# Patient Record
Sex: FEMALE | Race: WHITE | Employment: STUDENT | URBAN - METROPOLITAN AREA
[De-identification: names, ages, dates, MRNs, and addresses within clinical notes are randomized per-mention and may not be internally consistent; named-entity substitution may affect disease eponyms.]

---
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
## 2024-01-24 ENCOUNTER — LAB ENCOUNTER (OUTPATIENT)
Dept: LAB | Facility: HOSPITAL | Age: 19
End: 2024-01-24
Attending: INTERNAL MEDICINE
Payer: COMMERCIAL

## 2024-01-24 DIAGNOSIS — R79.89 HYPOURICEMIA: ICD-10-CM

## 2024-01-24 DIAGNOSIS — E22.2 SYNDROME OF INAPPROPRIATE VASOPRESSIN SECRETION (HCC): Primary | ICD-10-CM

## 2024-01-24 LAB
CORTIS SERPL-MCNC: 30.1 UG/DL
SODIUM SERPL-SCNC: 139 MMOL/L (ref 136–145)

## 2024-01-24 PROCEDURE — 36415 COLL VENOUS BLD VENIPUNCTURE: CPT

## 2024-01-24 PROCEDURE — 82533 TOTAL CORTISOL: CPT

## 2024-01-24 PROCEDURE — 84295 ASSAY OF SERUM SODIUM: CPT

## 2024-01-25 ENCOUNTER — LAB ENCOUNTER (OUTPATIENT)
Dept: LAB | Facility: HOSPITAL | Age: 19
End: 2024-01-25
Attending: Other
Payer: COMMERCIAL

## 2024-01-25 DIAGNOSIS — G40.411: Primary | ICD-10-CM

## 2024-01-25 LAB — CARBAMAZEPINE SERPL-MCNC: 11.8 UG/ML (ref 4–12)

## 2024-01-25 PROCEDURE — 80156 ASSAY CARBAMAZEPINE TOTAL: CPT

## 2024-01-25 PROCEDURE — 36415 COLL VENOUS BLD VENIPUNCTURE: CPT

## 2024-03-01 ENCOUNTER — LAB ENCOUNTER (OUTPATIENT)
Dept: LAB | Facility: HOSPITAL | Age: 19
End: 2024-03-01
Attending: INTERNAL MEDICINE
Payer: COMMERCIAL

## 2024-03-01 DIAGNOSIS — E87.1 HYPOSMOLALITY SYNDROME: ICD-10-CM

## 2024-03-01 DIAGNOSIS — E22.2 SYNDROME OF INAPPROPRIATE VASOPRESSIN SECRETION (HCC): Primary | ICD-10-CM

## 2024-03-01 LAB
ALBUMIN SERPL-MCNC: 4.3 G/DL (ref 3.2–4.8)
ANION GAP SERPL CALC-SCNC: 5 MMOL/L (ref 0–18)
BUN BLD-MCNC: 10 MG/DL (ref 9–23)
BUN/CREAT SERPL: 16.7 (ref 10–20)
CALCIUM BLD-MCNC: 9 MG/DL (ref 8.7–10.4)
CHLORIDE SERPL-SCNC: 110 MMOL/L (ref 98–112)
CO2 SERPL-SCNC: 23 MMOL/L (ref 21–32)
CREAT BLD-MCNC: 0.6 MG/DL
EGFRCR SERPLBLD CKD-EPI 2021: 133 ML/MIN/1.73M2 (ref 60–?)
GLUCOSE BLD-MCNC: 86 MG/DL (ref 70–99)
OSMOLALITY SERPL CALC.SUM OF ELEC: 284 MOSM/KG (ref 275–295)
OSMOLALITY SERPL: 285 MOSM/KG (ref 275–295)
PHOSPHATE SERPL-MCNC: 3 MG/DL (ref 2.4–5.1)
POTASSIUM SERPL-SCNC: 4 MMOL/L (ref 3.5–5.1)
SODIUM SERPL-SCNC: 138 MMOL/L (ref 136–145)

## 2024-03-01 PROCEDURE — 36415 COLL VENOUS BLD VENIPUNCTURE: CPT

## 2024-03-01 PROCEDURE — 80069 RENAL FUNCTION PANEL: CPT

## 2024-03-01 PROCEDURE — 83930 ASSAY OF BLOOD OSMOLALITY: CPT

## 2024-03-12 ENCOUNTER — LAB ENCOUNTER (OUTPATIENT)
Dept: LAB | Facility: HOSPITAL | Age: 19
End: 2024-03-12
Attending: INTERNAL MEDICINE
Payer: COMMERCIAL

## 2024-03-12 DIAGNOSIS — E22.2 SYNDROME OF INAPPROPRIATE VASOPRESSIN SECRETION (HCC): ICD-10-CM

## 2024-03-12 DIAGNOSIS — E87.1 HYPOSMOLALITY SYNDROME: ICD-10-CM

## 2024-03-12 LAB
CHLORIDE UR-SCNC: 166 MMOL/L/24HR (ref 110–250)
OSMOLALITY UR: 542 MOSM/KG (ref 300–1100)
POTASSIUM 24H UR-SRATE: 41.8 MMOL/L/24HR (ref 25–125)
SODIUM SERPL-SCNC: 168 MMOL/L/24HR (ref 40–220)
SPECIMEN VOL UR: 1250 ML

## 2024-03-12 PROCEDURE — 82570 ASSAY OF URINE CREATININE: CPT

## 2024-03-12 PROCEDURE — 84133 ASSAY OF URINE POTASSIUM: CPT

## 2024-03-12 PROCEDURE — 83935 ASSAY OF URINE OSMOLALITY: CPT

## 2024-03-12 PROCEDURE — 84300 ASSAY OF URINE SODIUM: CPT

## 2024-03-12 PROCEDURE — 82436 ASSAY OF URINE CHLORIDE: CPT

## 2024-03-13 ENCOUNTER — LAB ENCOUNTER (OUTPATIENT)
Dept: LAB | Facility: HOSPITAL | Age: 19
End: 2024-03-13
Attending: INTERNAL MEDICINE
Payer: COMMERCIAL

## 2024-03-13 DIAGNOSIS — E22.2 SYNDROME OF INAPPROPRIATE VASOPRESSIN SECRETION (HCC): ICD-10-CM

## 2024-03-13 DIAGNOSIS — E87.1 HYPOSMOLALITY SYNDROME: ICD-10-CM

## 2024-03-13 LAB — CREAT UR-SCNC: 30.2 MG/DL

## 2024-04-04 ENCOUNTER — LAB ENCOUNTER (OUTPATIENT)
Dept: LAB | Facility: HOSPITAL | Age: 19
End: 2024-04-04
Attending: INTERNAL MEDICINE
Payer: COMMERCIAL

## 2024-04-04 DIAGNOSIS — E22.2 SYNDROME OF INAPPROPRIATE VASOPRESSIN SECRETION (HCC): ICD-10-CM

## 2024-04-04 LAB — SODIUM SERPL-SCNC: 133 MMOL/L (ref 136–145)

## 2024-04-04 PROCEDURE — 84295 ASSAY OF SERUM SODIUM: CPT

## 2024-04-04 PROCEDURE — 36415 COLL VENOUS BLD VENIPUNCTURE: CPT

## 2024-04-11 ENCOUNTER — LAB ENCOUNTER (OUTPATIENT)
Dept: LAB | Facility: HOSPITAL | Age: 19
End: 2024-04-11
Attending: INTERNAL MEDICINE
Payer: COMMERCIAL

## 2024-04-11 DIAGNOSIS — E22.2 SYNDROME OF INAPPROPRIATE VASOPRESSIN SECRETION (HCC): ICD-10-CM

## 2024-04-11 LAB — SODIUM SERPL-SCNC: 132 MMOL/L (ref 136–145)

## 2024-04-11 PROCEDURE — 84295 ASSAY OF SERUM SODIUM: CPT

## 2024-04-11 PROCEDURE — 36415 COLL VENOUS BLD VENIPUNCTURE: CPT

## 2024-04-18 ENCOUNTER — LAB ENCOUNTER (OUTPATIENT)
Dept: LAB | Facility: HOSPITAL | Age: 19
End: 2024-04-18
Attending: PHYSICAL MEDICINE & REHABILITATION
Payer: COMMERCIAL

## 2024-04-18 DIAGNOSIS — E22.2 SYNDROME OF INAPPROPRIATE VASOPRESSIN SECRETION (HCC): ICD-10-CM

## 2024-04-18 LAB — SODIUM SERPL-SCNC: 132 MMOL/L (ref 136–145)

## 2024-04-18 PROCEDURE — 84295 ASSAY OF SERUM SODIUM: CPT

## 2024-04-18 PROCEDURE — 36415 COLL VENOUS BLD VENIPUNCTURE: CPT

## 2024-04-21 ENCOUNTER — APPOINTMENT (OUTPATIENT)
Dept: GENERAL RADIOLOGY | Facility: HOSPITAL | Age: 19
End: 2024-04-21
Attending: EMERGENCY MEDICINE
Payer: COMMERCIAL

## 2024-04-21 ENCOUNTER — APPOINTMENT (OUTPATIENT)
Dept: ULTRASOUND IMAGING | Facility: HOSPITAL | Age: 19
End: 2024-04-21
Attending: EMERGENCY MEDICINE
Payer: COMMERCIAL

## 2024-04-21 ENCOUNTER — HOSPITAL ENCOUNTER (INPATIENT)
Facility: HOSPITAL | Age: 19
LOS: 9 days | Discharge: HOME | End: 2024-04-30
Attending: EMERGENCY MEDICINE | Admitting: INTERNAL MEDICINE
Payer: COMMERCIAL

## 2024-04-21 ENCOUNTER — HOSPITAL ENCOUNTER (INPATIENT)
Facility: HOSPITAL | Age: 19
End: 2024-04-21
Attending: EMERGENCY MEDICINE | Admitting: INTERNAL MEDICINE
Payer: COMMERCIAL

## 2024-04-21 DIAGNOSIS — I82.422 ACUTE DEEP VEIN THROMBOSIS (DVT) OF ILIAC VEIN OF LEFT LOWER EXTREMITY (HCC): Primary | ICD-10-CM

## 2024-04-21 LAB
ANION GAP SERPL CALC-SCNC: 7 MMOL/L (ref 0–18)
ANION GAP SERPL CALC-SCNC: 8 MMOL/L (ref 0–18)
APTT PPP: 26.7 SECONDS (ref 23.3–35.6)
APTT PPP: 60.6 SECONDS (ref 23.3–35.6)
BASOPHILS # BLD AUTO: 0.02 X10(3) UL (ref 0–0.2)
BASOPHILS NFR BLD AUTO: 0.3 %
BUN BLD-MCNC: 13 MG/DL (ref 9–23)
BUN BLD-MCNC: 14 MG/DL (ref 9–23)
BUN/CREAT SERPL: 20 (ref 10–20)
BUN/CREAT SERPL: 25.9 (ref 10–20)
CALCIUM BLD-MCNC: 8.9 MG/DL (ref 8.7–10.4)
CALCIUM BLD-MCNC: 9 MG/DL (ref 8.7–10.4)
CHLORIDE SERPL-SCNC: 106 MMOL/L (ref 98–112)
CHLORIDE SERPL-SCNC: 107 MMOL/L (ref 98–112)
CO2 SERPL-SCNC: 23 MMOL/L (ref 21–32)
CO2 SERPL-SCNC: 24 MMOL/L (ref 21–32)
CREAT BLD-MCNC: 0.54 MG/DL
CREAT BLD-MCNC: 0.65 MG/DL
DEPRECATED RDW RBC AUTO: 40 FL (ref 35.1–46.3)
DEPRECATED RDW RBC AUTO: 41.4 FL (ref 35.1–46.3)
EGFRCR SERPLBLD CKD-EPI 2021: 130 ML/MIN/1.73M2 (ref 60–?)
EGFRCR SERPLBLD CKD-EPI 2021: 136 ML/MIN/1.73M2 (ref 60–?)
EOSINOPHIL # BLD AUTO: 0.08 X10(3) UL (ref 0–0.7)
EOSINOPHIL NFR BLD AUTO: 1.1 %
ERYTHROCYTE [DISTWIDTH] IN BLOOD BY AUTOMATED COUNT: 14.6 % (ref 11–15)
ERYTHROCYTE [DISTWIDTH] IN BLOOD BY AUTOMATED COUNT: 14.7 % (ref 11–15)
GLUCOSE BLD-MCNC: 103 MG/DL (ref 70–99)
GLUCOSE BLD-MCNC: 108 MG/DL (ref 70–99)
HCT VFR BLD AUTO: 28.3 %
HCT VFR BLD AUTO: 30.3 %
HGB BLD-MCNC: 9 G/DL
HGB BLD-MCNC: 9.5 G/DL
IMM GRANULOCYTES # BLD AUTO: 0.03 X10(3) UL (ref 0–1)
IMM GRANULOCYTES NFR BLD: 0.4 %
INR BLD: 1.09 (ref 0.8–1.2)
LYMPHOCYTES # BLD AUTO: 0.57 X10(3) UL (ref 1.5–5)
LYMPHOCYTES NFR BLD AUTO: 8.1 %
MCH RBC QN AUTO: 24.2 PG (ref 26–34)
MCH RBC QN AUTO: 24.3 PG (ref 26–34)
MCHC RBC AUTO-ENTMCNC: 31.4 G/DL (ref 31–37)
MCHC RBC AUTO-ENTMCNC: 31.8 G/DL (ref 31–37)
MCV RBC AUTO: 76.1 FL
MCV RBC AUTO: 77.5 FL
MONOCYTES # BLD AUTO: 0.74 X10(3) UL (ref 0.1–1)
MONOCYTES NFR BLD AUTO: 10.5 %
NEUTROPHILS # BLD AUTO: 5.63 X10 (3) UL (ref 1.5–7.7)
NEUTROPHILS # BLD AUTO: 5.63 X10(3) UL (ref 1.5–7.7)
NEUTROPHILS NFR BLD AUTO: 79.6 %
OSMOLALITY SERPL CALC.SUM OF ELEC: 285 MOSM/KG (ref 275–295)
OSMOLALITY SERPL CALC.SUM OF ELEC: 287 MOSM/KG (ref 275–295)
PLATELET # BLD AUTO: 309 10(3)UL (ref 150–450)
PLATELET # BLD AUTO: 312 10(3)UL (ref 150–450)
POTASSIUM SERPL-SCNC: 3.9 MMOL/L (ref 3.5–5.1)
POTASSIUM SERPL-SCNC: 4.4 MMOL/L (ref 3.5–5.1)
PROTHROMBIN TIME: 14.8 SECONDS (ref 11.6–14.8)
RBC # BLD AUTO: 3.72 X10(6)UL
RBC # BLD AUTO: 3.91 X10(6)UL
SODIUM SERPL-SCNC: 137 MMOL/L (ref 136–145)
SODIUM SERPL-SCNC: 138 MMOL/L (ref 136–145)
WBC # BLD AUTO: 5.4 X10(3) UL (ref 4–11)
WBC # BLD AUTO: 7.1 X10(3) UL (ref 4–11)

## 2024-04-21 PROCEDURE — 93971 EXTREMITY STUDY: CPT | Performed by: EMERGENCY MEDICINE

## 2024-04-21 PROCEDURE — 73502 X-RAY EXAM HIP UNI 2-3 VIEWS: CPT | Performed by: EMERGENCY MEDICINE

## 2024-04-21 PROCEDURE — 99223 1ST HOSP IP/OBS HIGH 75: CPT | Performed by: INTERNAL MEDICINE

## 2024-04-21 RX ORDER — CETIRIZINE HYDROCHLORIDE 10 MG/1
10 TABLET ORAL DAILY
Status: DISCONTINUED | OUTPATIENT
Start: 2024-04-22 | End: 2024-04-30

## 2024-04-21 RX ORDER — SODIUM CHLORIDE 9 MG/ML
INJECTION, SOLUTION INTRAVENOUS CONTINUOUS
Status: DISCONTINUED | OUTPATIENT
Start: 2024-04-21 | End: 2024-04-24

## 2024-04-21 RX ORDER — SODIUM CHLORIDE 1 G/1
1 TABLET ORAL
COMMUNITY

## 2024-04-21 RX ORDER — ACETAMINOPHEN 325 MG/1
650 TABLET ORAL ONCE
Status: COMPLETED | OUTPATIENT
Start: 2024-04-21 | End: 2024-04-21

## 2024-04-21 RX ORDER — MULTIVIT-MIN/IRON/FOLIC ACID/K 18-600-40
1 CAPSULE ORAL DAILY
COMMUNITY

## 2024-04-21 RX ORDER — CETIRIZINE HYDROCHLORIDE 10 MG/1
10 TABLET ORAL 2 TIMES DAILY
Status: DISCONTINUED | OUTPATIENT
Start: 2024-04-21 | End: 2024-04-21

## 2024-04-21 RX ORDER — HEPARIN SODIUM 1000 [USP'U]/ML
80 INJECTION, SOLUTION INTRAVENOUS; SUBCUTANEOUS ONCE
Status: COMPLETED | OUTPATIENT
Start: 2024-04-21 | End: 2024-04-21

## 2024-04-21 RX ORDER — TRAMADOL HYDROCHLORIDE 50 MG/1
50 TABLET ORAL EVERY 6 HOURS PRN
Status: DISCONTINUED | OUTPATIENT
Start: 2024-04-21 | End: 2024-04-30

## 2024-04-21 RX ORDER — CETIRIZINE HYDROCHLORIDE 10 MG/1
10 TABLET ORAL DAILY
Status: DISCONTINUED | OUTPATIENT
Start: 2024-04-22 | End: 2024-04-21

## 2024-04-21 RX ORDER — ONDANSETRON 2 MG/ML
4 INJECTION INTRAMUSCULAR; INTRAVENOUS EVERY 6 HOURS PRN
Status: DISCONTINUED | OUTPATIENT
Start: 2024-04-21 | End: 2024-04-21

## 2024-04-21 RX ORDER — CETIRIZINE HYDROCHLORIDE 10 MG/1
10 TABLET ORAL ONCE
Status: COMPLETED | OUTPATIENT
Start: 2024-04-22 | End: 2024-04-22

## 2024-04-21 RX ORDER — FAMOTIDINE 20 MG/1
20 TABLET, FILM COATED ORAL 2 TIMES DAILY
Status: DISCONTINUED | OUTPATIENT
Start: 2024-04-21 | End: 2024-04-30

## 2024-04-21 RX ORDER — FAMOTIDINE 20 MG/1
20 TABLET, FILM COATED ORAL 2 TIMES DAILY
COMMUNITY

## 2024-04-21 RX ORDER — NORETHINDRONE ACETATE AND ETHINYL ESTRADIOL 1.5; 3 MG/1; UG/1
TABLET ORAL
Status: ON HOLD | COMMUNITY
End: 2024-04-21

## 2024-04-21 RX ORDER — HEPARIN SODIUM AND DEXTROSE 10000; 5 [USP'U]/100ML; G/100ML
18 INJECTION INTRAVENOUS ONCE
Status: COMPLETED | OUTPATIENT
Start: 2024-04-21 | End: 2024-04-21

## 2024-04-21 RX ORDER — CETIRIZINE HYDROCHLORIDE 10 MG/1
10 TABLET ORAL AS NEEDED
COMMUNITY

## 2024-04-21 RX ORDER — FEXOFENADINE HCL 180 MG/1
180 TABLET ORAL 2 TIMES DAILY
COMMUNITY

## 2024-04-21 RX ORDER — CETIRIZINE HYDROCHLORIDE 10 MG/1
10 TABLET ORAL ONCE
Status: COMPLETED | OUTPATIENT
Start: 2024-04-21 | End: 2024-04-21

## 2024-04-21 RX ORDER — CETIRIZINE HYDROCHLORIDE 10 MG/1
10 TABLET ORAL DAILY
Status: DISCONTINUED | OUTPATIENT
Start: 2024-04-21 | End: 2024-04-21

## 2024-04-21 RX ORDER — ACETAMINOPHEN 500 MG
500 TABLET ORAL EVERY 4 HOURS PRN
Status: DISCONTINUED | OUTPATIENT
Start: 2024-04-21 | End: 2024-04-30

## 2024-04-21 RX ORDER — CARBAMAZEPINE 100 MG/1
300 TABLET, EXTENDED RELEASE ORAL EVERY 12 HOURS
Status: DISCONTINUED | OUTPATIENT
Start: 2024-04-21 | End: 2024-04-30

## 2024-04-21 RX ORDER — DIPHENHYDRAMINE HCL 25 MG
50 CAPSULE ORAL ONCE
Qty: 2 CAPSULE | Refills: 0 | Status: DISCONTINUED | OUTPATIENT
Start: 2024-04-22 | End: 2024-04-21

## 2024-04-21 RX ORDER — FEXOFENADINE HCL 180 MG/1
180 TABLET ORAL 2 TIMES DAILY
Status: DISCONTINUED | OUTPATIENT
Start: 2024-04-21 | End: 2024-04-30

## 2024-04-21 RX ORDER — HEPARIN SODIUM AND DEXTROSE 10000; 5 [USP'U]/100ML; G/100ML
INJECTION INTRAVENOUS CONTINUOUS
Status: DISCONTINUED | OUTPATIENT
Start: 2024-04-21 | End: 2024-04-25

## 2024-04-21 NOTE — ED INITIAL ASSESSMENT (HPI)
Pt in wheelchair w/ mother through triage c/o throbbing L hip pain upon waking this am. Left leg noted to be discolored. Poss clotting disorder and on birth control.    400mg ibuprofen @ 1000

## 2024-04-21 NOTE — CM/SW NOTE
Care Coordination [facility]ert[facility] Transfer Note:     Reason for transfer: continuity of care    Request initiated by: Dr. Redd     Active Acute Medical issue:  Acute left lower extremity DVT  -Patient presenting with left lower extremity pain  -Patient began to have mottling and discoloration of left lower extremity  -US venous Doppler of left leg noting extensive DVT from left external iliac vein to left upper calf veins.  -Starting heparin GTT  -IR consulted in ED, planning to reevaluate for need of catheter directed thrombolytics.  Recommend keeping patient n.p.o. after midnight.  Appreciate further recommendations  -Pain control as needed  -Patient with reported history of IV contrast allergy.  Starting on premedications in order to check CT for signs of PE.  -Continue to monitor     History of seizure disorder  -Continue home antiepileptics     Other medical problems include   Koolen-Agustina syndrome   Asthma   GERD  tethered cord syndrome   neurogenic bladder  Kyphoscoliosis   bilateral hip dysplasia status post bilateral varus derotation osteotomys  diaphragmatic hernia  mast cell activation syndrome        Anticipated Transfer Plan: Transfer to [facility] where patient gets her care. I called and spoke to Sarthak who asked that I fax a face sheet. It was faxed to [phone]. Sarthak was then transferred to Dr. Salomon for report.     3361- Images were tubed to primary nurse. Per Dr. Umm De León from [facility] states this wouldn't be for higher level of care and will need to get prior auth for transfer/transport. He will also get an estimate for the patient in the morning.

## 2024-04-21 NOTE — ED QUICK NOTES
Per mom, pt started with flushing face, which she gets with her mast cell issue.  Requesting she get zyrtec.  Odt 10mg home tablet given per ERMD ok.  Heparin held at this time.  Will restart in 15min.  Will cont. To monitor.

## 2024-04-21 NOTE — ED PROVIDER NOTES
Patient Seen in: [facility] Emergency Department      History     Chief Complaint   Patient presents with    Deep Vein Thrombosis     Stated Complaint: Left leg pain    Subjective:   HPI    18-year-old female with history of Koolen-Agustina syndrome, asthma, GERD, seizure disorder, tethered cord syndrome, neurogenic bladder, decreased bone density, kyphoscoliosis, bilateral hip dysplasia status post bilateral varus derotation osteotomys, intellectual disability, diaphragmatic hernia, mast cell activation syndrome, and urinary reflux presents with complaints of left hip pain and discoloration noted to the left lower extremity.  The patient reports onset of to her left hip this morning.  The pain is worsened with standing and moving.  She was noted to have bluish discoloration to her left lower extremity today prompting her parents to bring her to the emergency department.  She denies any knee or leg pain.  The pain is primarily to her hip and upper thigh.  She denies any recent injuries.  She does complain of some lightheadedness.  The patient is presently on oral contraception.    Objective:   Past Medical History:    Celiac disease (HCC)    Epilepsy (HCC)    Hearing loss    Hernia, diaphragmatic, congenital (HCC)    Hip dysplasia (HCC)    Mast cell activation (HCC)    Seizure disorder (HCC)    Urinary reflux    Vesicoureteral reflux              Past Surgical History:   Procedure Laterality Date    Other surgical history      diaphram hernia repair and uretral repair                 Social History     Socioeconomic History    Marital status: Single   Tobacco Use    Smoking status: Never    Smokeless tobacco: Never    Tobacco comments:     No household smokers.      Social Determinants of Health     Financial Resource Strain: Low Risk  (6/7/2022)    [facility]    Overall Financial Resource Strain (CARDIA)     Difficulty  of Paying Living Expenses: Not hard at all    [facility]    Food Insecurity    [facility]    Transportation Needs   Stress: No Stress Concern Present (6/7/2022)    [facility]    [employer] - Occupational Stress Questionnaire     Feeling of Stress : Only a little    [facility]    Social Connections   Housing Stability: Low Risk  (12/15/2023)    [facility]    Housing Stability Vital Sign     Unable to Pay for Housing in the Last Year: No     Number of Places Lived in the Last Year: 1     In the last 12 months, was there a time when you did not have a steady place to sleep or slept in a shelter (including now)?: No              Review of Systems    Positive for stated complaint: Left leg pain  Other systems are as noted in HPI.  Constitutional and vital signs reviewed.      All other systems reviewed and negative except as noted above.    Physical Exam     ED Triage Vitals [04/21/24 1023]   BP 97/64   Pulse 94   Resp 18   Temp 97.6 °F (36.4 °C)   Temp src Temporal   SpO2 100 %   O2 Device None (Room air)       Current:BP (!) 123/92   Pulse 101   Temp 97.6 °F (36.4 °C) (Temporal)   Resp 14   Ht 149.9 cm (4' 11\")   Wt 46.9 kg   SpO2 100%   BMI 20.88 kg/m²         Physical Exam      General Appearance: alert, no distress  Eyes: pupils equal and round no pallor or injection  ENT, Mouth: mucous membranes moist  Respiratory: there are no retractions, lungs are clear to auscultation  Cardiovascular: regular rate and rhythm  Gastrointestinal:  abdomen is soft and non tender, no masses, bowel sounds normal  Neurological: Speech normal.  Motor and sensation is intact and symmetric to bilateral upper and lower  extremities.  Skin: warm and dry, no rashes.  Musculoskeletal: neck is supple non tender        Bluish discoloration noted to the entirety of the left lower extremity.  No tenderness to palpation of the leg.  There is some tenderness to palpation and range of motion of the left hip.  No edema noted.  Bilateral dorsalis pedis pulses audible by Doppler.  Psychiatric: patient is oriented X 3, there is no agitation    DIFFERENTIAL DIAGNOSIS: After history and physical exam differential diagnosis was considered for deep vein thrombosis or other        ED Course     Labs Reviewed   BASIC METABOLIC PANEL (8) - Abnormal; Notable for the following components:       Result Value    Glucose 108 (*)     All other components within normal limits   CBC W/ DIFFERENTIAL - Abnormal; Notable for the following components:    HGB 9.5 (*)     HCT 30.3 (*)     MCV 77.5 (*)     MCH 24.3 (*)     Lymphocyte Absolute 0.57 (*)     All other components within normal limits   PROTHROMBIN TIME (PT) - Normal   PTT, ACTIVATED - Normal   CBC WITH DIFFERENTIAL WITH PLATELET    Narrative:     The following orders were created for panel order CBC With Differential With Platelet.  Procedure                               Abnormality         Status                     ---------                               -----------         ------                     CBC W/ DIFFERENTIAL[[id number]]          Abnormal            Final result                 Please view results for these tests on the individual orders.   RAINBOW DRAW [facility]      Lab and ultrasound results noted.  Patient with extensive deep vein thrombosis.  Unable to obtain a CT of the chest without prepping the patient given her mast cell activation syndrome.  Will begin IV heparin and admit for further evaluation.  Discussed with Dr. Redd, hospitalist.  Also discussed with Dr. Collins, interventional radiology.  He requests that the patient obtain a CT of the chest through the  abdomen/pelvis.  He also request that the patient be kept n.p.o. after midnight and be prepped for likely intervention tomorrow.  Admission disposition: 4/21/2024  2:01 PM                                        Medical Decision Making      Disposition and Plan     Clinical Impression:  1. Acute deep vein thrombosis (DVT) of iliac vein of left lower extremity (HCC)         Disposition:  Admit  4/21/2024  2:01 pm    Follow-up:  No follow-up provider specified.        Medications Prescribed:  Current Discharge Medication List                            Hospital Problems       Present on Admission  Date Reviewed: 11/9/2016            ICD-10-CM Noted POA    * (Principal) Acute deep vein thrombosis (DVT) of iliac vein of left lower extremity (HCC) I82.422 4/21/2024 Unknown

## 2024-04-21 NOTE — H&P
[facility]  part of [facility]  HISTORY AND PHYSICAL       Nicky Nam Patient Status:  Emergency    2005  18 year old CSN [id number]   Location  Attending Aidan Carroll MD     PCP Tu Yeung MD         DATE OF ADMISSION: 2024     CHIEF COMPLAINT: Left leg pain    HISTORY OF PRESENT ILLNESS  This is a 18 year oldfemale who presented complaining of left leg pain.  Patient stated pain began on morning of admission.  Pain was mostly located in her left hip and thigh.  Pain was worsened with bearing weight and moving.  Mother at bedside noted development of bluish discoloration prompting visit to ED.  Upon further review, mother did state patient has been having an abnormal gait for the past day or so.  At time of interview, patient stated pain was still present, but improved.  Pain was moving down into her left calf.  Patient denied chest pain or shortness of breath.    Of note, patient has history of mast cell activation syndrome.and Koolen winston syndrome.  Patient's mother denies known history of family clotting disorders.  Mother did note \" possible weak positivity\" for lupus anticoagulant.    PAST MEDICAL HISTORY  Past Medical History:    Celiac disease (HCC)    Epilepsy (HCC)    Hearing loss    Hernia, diaphragmatic, congenital (HCC)    Hip dysplasia (HCC)    Seizure disorder (HCC)    Urinary reflux    Vesicoureteral reflux        PAST SURGICAL HISTORY  Past Surgical History:   Procedure Laterality Date    Other surgical history      diaphram hernia repair and uretral repair        ALLERGIES   Augmentin [amoxicillin-pot clavulanate], Penicillins, and Red dye    MEDICATIONS  Patient's Medications   New Prescriptions    No medications on file   Previous Medications    AZITHROMYCIN (ZITHROMAX Z-KARTHIK) 250 MG ORAL TAB    500 mg once followed by 250 mg daily x 4 days    CARBAMAZEPINE  MG ORAL CAPSULE SR 12 HR        DIAZEPAM 10 MG RECTAL GEL    I 7.5 MG  RECTALLY PRF SEIZURE    EPINEPHRINE (EPIPEN 2-KARTHIK) 0.3 MG/0.3ML INJECTION SOLUTION AUTO-INJECTOR    Inject IM in event of  allergic reaction. Dispense twin pack, ok to dispense generic Mylan if ok with parent    FLUVIRIN INTRAMUSCULAR SUSPENSION    ADM 0.5ML IM UTD    FOCALIN XR 10 MG ORAL CAPSULE SR 24 HR        MICROGESTIN 1.5/30 1.5-30 MG-MCG ORAL TAB    TAKE ONE TABLET BY MOUTH DAILY. SKIP PLACEBOS FOR ABNORMAL UTERINE BLEEDING   Modified Medications    No medications on file   Discontinued Medications    No medications on file       SOCIAL HISTORY  Social History     Socioeconomic History    Marital status: Single   Tobacco Use    Smoking status: Never    Smokeless tobacco: Never    Tobacco comments:     No household smokers.        FAMILY HISTORY  No family history on file.    PHYSICAL EXAM  Vital signs:  /73   Pulse 96   Temp 97.6 °F (36.4 °C) (Temporal)   Resp 21   Ht 4' 11\" (1.499 m)   Wt 103 lb 6.3 oz (46.9 kg)   SpO2 98%   BMI 20.88 kg/m²      GENERAL:  Awake and alert, in no acute distress.  HEART:  Regular rhythm.  Mild tachycardia  LUNGS:  Air entry was minimally decreased.  No crackles or wheezes   ABDOMEN: Soft and non-tender.    PSYCHIATRIC: Normal mood      IMAGING  US VENOUS DOPPLER LEG LEFT - DIAG IMG (CPT=93971)    Result Date: 4/21/2024  CONCLUSION:  1. Extensive DVT throughout the left lower extremity extending from the left external iliac vein to the left upper calf veins.    Dictated by (CST): Sagar Smith MD on 4/21/2024 at 11:59 AM     Finalized by (CST): Sagar Smith MD on 4/21/2024 at 12:00 PM          XR HIP W OR WO PELVIS 2 OR 3 VIEWS, LEFT (CPT=73502)    Result Date: 4/21/2024  CONCLUSION:   No acute fracture, dislocation, or significant osteoarthritis.    Dictated by (CST): Kenya Angel MD on 4/21/2024 at 11:17 AM     Finalized by (CST): Kenya Angel MD on 4/21/2024 at 11:18 AM           Data:  Recent Labs   Lab 04/21/24  1056   RBC 3.91   HGB 9.5*    HCT 30.3*   MCV 77.5*   MCH 24.3*   MCHC 31.4   RDW 14.7   NEPRELIM 5.63   WBC 7.1   .0     Recent Labs   Lab 04/18/24  1522 04/21/24  1056   GLU  --  108*   BUN  --  13   CREATSERUM  --  0.65   CA  --  8.9   * 138   K  --  4.4   CL  --  106   CO2  --  24.0       ASSESSMENT/PLAN    Acute left lower extremity DVT  -Patient presenting with left lower extremity pain  -Patient began to have mottling and discoloration of left lower extremity  -US venous Doppler of left leg noting extensive DVT from left external iliac vein to left upper calf veins.  -Starting heparin GTT  -IR consulted in ED, planning to reevaluate for need of catheter directed thrombolytics.  Recommend keeping patient n.p.o. after midnight.  Appreciate further recommendations  -Pain control as needed  -Patient with reported history of IV contrast allergy.  Starting on premedications in order to check CT for signs of PE.  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  Discussed with ED physician and RN. Decision made that pt needs hospitalization for further management/monitoring.      Jose Redd MD    This note was prepared using Dragon Medical voice recognition dictation software. As a result errors may occur. When identified these errors have been corrected. While every attempt is made to correct errors during dictation discrepancies may still exist

## 2024-04-21 NOTE — ED QUICK NOTES
Orders for admission, patient is aware of plan and ready to go upstairs. Any questions, please call ED MIGUEL hale at extension [phone].     Patient Covid vaccination status: Fully vaccinated     COVID Test Ordered in ED: None    COVID Suspicion at Admission: N/A    Running Infusions:    continuous dose heparin          Mental Status/LOC at time of transport: aox4    Other pertinent information:   CIWA score: N/A   NIH score:  N/A

## 2024-04-22 ENCOUNTER — APPOINTMENT (OUTPATIENT)
Dept: CT IMAGING | Facility: HOSPITAL | Age: 19
End: 2024-04-22
Attending: INTERNAL MEDICINE
Payer: COMMERCIAL

## 2024-04-22 LAB
ALBUMIN SERPL-MCNC: 4.2 G/DL (ref 3.2–4.8)
ALBUMIN/GLOB SERPL: 1.6 {RATIO} (ref 1–2)
ALP LIVER SERPL-CCNC: 75 U/L
ALT SERPL-CCNC: 16 U/L
ANION GAP SERPL CALC-SCNC: 7 MMOL/L (ref 0–18)
APTT PPP: 76.1 SECONDS (ref 23.3–35.6)
AST SERPL-CCNC: 16 U/L (ref ?–34)
BASOPHILS # BLD AUTO: 0 X10(3) UL (ref 0–0.2)
BASOPHILS NFR BLD AUTO: 0 %
BILIRUB SERPL-MCNC: 0.2 MG/DL (ref 0.3–1.2)
BUN BLD-MCNC: 10 MG/DL (ref 9–23)
BUN/CREAT SERPL: 17.5 (ref 10–20)
CALCIUM BLD-MCNC: 8.8 MG/DL (ref 8.7–10.4)
CHLORIDE SERPL-SCNC: 109 MMOL/L (ref 98–112)
CO2 SERPL-SCNC: 22 MMOL/L (ref 21–32)
CREAT BLD-MCNC: 0.57 MG/DL
DEPRECATED RDW RBC AUTO: 41.1 FL (ref 35.1–46.3)
EGFRCR SERPLBLD CKD-EPI 2021: 134 ML/MIN/1.73M2 (ref 60–?)
EOSINOPHIL # BLD AUTO: 0 X10(3) UL (ref 0–0.7)
EOSINOPHIL NFR BLD AUTO: 0 %
ERYTHROCYTE [DISTWIDTH] IN BLOOD BY AUTOMATED COUNT: 14.6 % (ref 11–15)
GLOBULIN PLAS-MCNC: 2.6 G/DL (ref 2.8–4.4)
GLUCOSE BLD-MCNC: 151 MG/DL (ref 70–99)
HCT VFR BLD AUTO: 29.1 %
HGB BLD-MCNC: 9 G/DL
IMM GRANULOCYTES # BLD AUTO: 0.02 X10(3) UL (ref 0–1)
IMM GRANULOCYTES NFR BLD: 0.6 %
LYMPHOCYTES # BLD AUTO: 0.26 X10(3) UL (ref 1.5–5)
LYMPHOCYTES NFR BLD AUTO: 7.4 %
MAGNESIUM SERPL-MCNC: 2 MG/DL (ref 1.6–2.6)
MCH RBC QN AUTO: 24.3 PG (ref 26–34)
MCHC RBC AUTO-ENTMCNC: 30.9 G/DL (ref 31–37)
MCV RBC AUTO: 78.4 FL
MONOCYTES # BLD AUTO: 0.08 X10(3) UL (ref 0.1–1)
MONOCYTES NFR BLD AUTO: 2.3 %
NEUTROPHILS # BLD AUTO: 3.16 X10 (3) UL (ref 1.5–7.7)
NEUTROPHILS # BLD AUTO: 3.16 X10(3) UL (ref 1.5–7.7)
NEUTROPHILS NFR BLD AUTO: 89.7 %
OSMOLALITY SERPL CALC.SUM OF ELEC: 288 MOSM/KG (ref 275–295)
PLATELET # BLD AUTO: 297 10(3)UL (ref 150–450)
PLATELET # BLD AUTO: 297 10(3)UL (ref 150–450)
POTASSIUM SERPL-SCNC: 4.6 MMOL/L (ref 3.5–5.1)
PROT SERPL-MCNC: 6.8 G/DL (ref 5.7–8.2)
RBC # BLD AUTO: 3.71 X10(6)UL
SODIUM SERPL-SCNC: 138 MMOL/L (ref 136–145)
WBC # BLD AUTO: 3.5 X10(3) UL (ref 4–11)

## 2024-04-22 PROCEDURE — 71260 CT THORAX DX C+: CPT | Performed by: INTERNAL MEDICINE

## 2024-04-22 PROCEDURE — 99233 SBSQ HOSP IP/OBS HIGH 50: CPT | Performed by: INTERNAL MEDICINE

## 2024-04-22 PROCEDURE — 99418 PROLNG IP/OBS E/M EA 15 MIN: CPT | Performed by: INTERNAL MEDICINE

## 2024-04-22 NOTE — PLAN OF CARE
Problem: Patient Centered Care  Goal: Patient preferences are identified and integrated in the patient's plan of care  Description: Interventions:  - What would you like us to know as we care for you? Home with family   - Provide timely, complete, and accurate information to patient/family  - Incorporate patient and family knowledge, values, beliefs, and cultural backgrounds into the planning and delivery of care  - Encourage patient/family to participate in care and decision-making at the level they choose  - Honor patient and family perspectives and choices  Outcome: Progressing     Problem: Patient/Family Goals  Goal: Patient/Family Long Term Goal  Description: Patient's Long Term Goal: resolve DVT     Interventions:  - Monitor vitals  - Monitor appropriate labs  - Administer medications as ordered  - Follow MD's orders  - Update patient on plan of care   - Discharge planning     - See additional Care Plan goals for specific interventions  Outcome: Progressing  Goal: Patient/Family Short Term Goal  Description: Patient's Short Term Goal: Transfer to [facility]     Interventions:   - Social work consult   - Follow doctor's orders   - See additional Care Plan goals for specific interventions  Outcome: Progressing     Problem: SKIN/TISSUE INTEGRITY - ADULT  Goal: Skin integrity remains intact  Description: INTERVENTIONS  - Assess and document risk factors for pressure ulcer development  - Assess and document skin integrity  - Monitor for areas of redness and/or skin breakdown  - Initiate interventions, skin care algorithm/standards of care as needed  Outcome: Progressing     Problem: HEMATOLOGIC - ADULT  Goal: Maintains hematologic stability  Description: INTERVENTIONS  - Assess for signs and symptoms of bleeding or hemorrhage  - Monitor labs and vital signs for trends  - Administer supportive blood products/factors, fluids and medications as ordered and appropriate  - Administer supportive blood products/factors as  ordered and appropriate  Outcome: Progressing  Goal: Free from bleeding injury  Description: (Example usage: patient with low platelets)  INTERVENTIONS:  - Avoid intramuscular injections, enemas and rectal medication administration  - Ensure safe mobilization of patient  - Hold pressure on venipuncture sites to achieve adequate hemostasis  - Assess for signs and symptoms of internal bleeding  - Monitor lab trends  - Patient is to report abnormal signs of bleeding to staff  - Avoid use of toothpicks and dental floss  - Use electric shaver for shaving  - Use soft bristle tooth brush  - Limit straining and forceful nose blowing  Outcome: Progressing     Problem: PAIN - ADULT  Goal: Verbalizes/displays adequate comfort level or patient's stated pain goal  Description: INTERVENTIONS:  - Encourage pt to monitor pain and request assistance  - Assess pain using appropriate pain scale  - Administer analgesics based on type and severity of pain and evaluate response  - Implement non-pharmacological measures as appropriate and evaluate response  - Consider cultural and social influences on pain and pain management  - Manage/alleviate anxiety  - Utilize distraction and/or relaxation techniques  - Monitor for opioid side effects  - Notify MD/LIP if interventions unsuccessful or patient reports new pain  - Anticipate increased pain with activity and pre-medicate as appropriate  Outcome: Progressing     Problem: RISK FOR INFECTION - ADULT  Goal: Absence of fever/infection during anticipated neutropenic period  Description: INTERVENTIONS  - Monitor WBC  - Administer growth factors as ordered  - Implement neutropenic guidelines  Outcome: Progressing     Problem: SAFETY ADULT - FALL  Goal: Free from fall injury  Description: INTERVENTIONS:  - Assess pt frequently for physical needs  - Identify cognitive and physical deficits and behaviors that affect risk of falls.  - [place] fall precautions as indicated by assessment.  - Educate  pt/family on patient safety including physical limitations  - Instruct pt to call for assistance with activity based on assessment  - Modify environment to reduce risk of injury  - Provide assistive devices as appropriate  - Consider OT/PT consult to assist with strengthening/mobility  - Encourage toileting schedule  Outcome: Progressing     Problem: DISCHARGE PLANNING  Goal: Discharge to home or other facility with appropriate resources  Description: INTERVENTIONS:  - Identify barriers to discharge w/pt and caregiver  - Include patient/family/discharge partner in discharge planning  - Arrange for needed discharge resources and transportation as appropriate  - Identify discharge learning needs (meds, wound care, etc)  - Arrange for interpreters to assist at discharge as needed  - Consider post-discharge preferences of patient/family/discharge partner  - Complete POLST form as appropriate  - Assess patient's ability to be responsible for managing their own health  - Refer to Case Management Department for coordinating discharge planning if the patient needs post-hospital services based on physician/LIP order or complex needs related to functional status, cognitive ability or social support system  Outcome: Progressing       Monitoring vitals. Family at bedside. Continues on heparin drip. Denies pain. NPO at midnight. CT in AM. Patient and family aware of plan of care.

## 2024-04-22 NOTE — TRANSFER CENTER NOTE
Contacted [facility] to get an estimate cost for the transport to [facility] via critical care transport. It was $1410 for mileage alone and the critical care transport was $6910. Total cost $8,050.    Spoke to the pt's mom and provided her with the estimate and after speaking with her  and family they want to stay at [facility] to have the procedure done tomorrow.     Contacted Shawanda Degroot with IR and stated they will be able to see the pt tomorrow for the procedure, she will see the pt in the morning.    Notified Dr Redd of the above information.    Spoke to Yessy RIVERA and updated her as well.    Contacted [facility] transfer center and cancelled the transfer.

## 2024-04-22 NOTE — TRANSFER CENTER NOTE
Spoke to Pastor with [facility] concerning the request for authorization for transfer. She stated that [facility] cannot request an auth for [facility] and it has to be [facility] to request the authorization. She stated the auth that was requested this morning was for this current hospitalization, which was not correct. The request was for the transfer to [facility].    Spoke to Mrs Nam and updated her. Again this transfer center spoke with Terence to explain the above information. Their financial department is wanting to speak with Mrs Nma and provided them with her number. I did let Mrs Nam know that they will be calling.

## 2024-04-22 NOTE — TRANSFER CENTER NOTE
Spoke to Marsha at [facility] transfer center. Dr Marquez with hematology has accepted the pt. They are still waiting on insurance auth for the transfer to occur. They have reached out to the financial department and it is still pending.

## 2024-04-22 NOTE — TRANSFER CENTER NOTE
The financial department has not gotten back to Mrs Nam. Spoke with Tomasa at the transfer center and they are gathering the estimates on their end and will call the pt's mom. Contacted Mrs Nam and updated her.

## 2024-04-22 NOTE — TRANSFER CENTER NOTE
Contacted [facility] for authorization of the transfer.   Clinicals faxed to [phone]  Request ID #[id number]

## 2024-04-22 NOTE — PLAN OF CARE
Patient is Aox4, vital signs stable, NPO for possible procedure, Heparin GTT infusing, possible transfer to [facility] today, bedrest. Rounding maintained. Call light within reach. Fall precautions maintained.       Problem: SKIN/TISSUE INTEGRITY - ADULT  Goal: Skin integrity remains intact  Description: INTERVENTIONS  - Assess and document risk factors for pressure ulcer development  - Assess and document skin integrity  - Monitor for areas of redness and/or skin breakdown  - Initiate interventions, skin care algorithm/standards of care as needed  Outcome: Progressing     Problem: HEMATOLOGIC - ADULT  Goal: Maintains hematologic stability  Description: INTERVENTIONS  - Assess for signs and symptoms of bleeding or hemorrhage  - Monitor labs and vital signs for trends  - Administer supportive blood products/factors, fluids and medications as ordered and appropriate  - Administer supportive blood products/factors as ordered and appropriate  Outcome: Progressing  Goal: Free from bleeding injury  Description: (Example usage: patient with low platelets)  INTERVENTIONS:  - Avoid intramuscular injections, enemas and rectal medication administration  - Ensure safe mobilization of patient  - Hold pressure on venipuncture sites to achieve adequate hemostasis  - Assess for signs and symptoms of internal bleeding  - Monitor lab trends  - Patient is to report abnormal signs of bleeding to staff  - Avoid use of toothpicks and dental floss  - Use electric shaver for shaving  - Use soft bristle tooth brush  - Limit straining and forceful nose blowing  Outcome: Progressing

## 2024-04-22 NOTE — TRANSFER CENTER NOTE
Spoke to [facility] transfer center. They are checking the pt's insurance to verify that the pt can come to [facility]. They also need insurance authorization for the pt to come to [facility] since it is a family request.

## 2024-04-22 NOTE — PROGRESS NOTES
[facility]  part of [facility] Hospitalist Progress Note     Nicky Nam Patient Status:  Inpatient    2005 MRN [id number]   Location [facility] 5SW/SE Attending Jose Redd MD   Hosp Day # 1 PCP Tu Yeung MD     Chief Complaint:   Chief Complaint   Patient presents with    Deep Vein Thrombosis        Subjective:     Patient seen lying in bed.  Mother and father at bedside.  Patient denies acute events overnight.  Patient reports left hip and leg pain much improved.  Mother at bedside states left leg coloration returning to normal, but still having some swelling.  Patient denied current chest pain, shortness of breath, nausea or vomiting.    Objective:      Vital signs:  Vitals:    24 2116 24 2217 24 0501 24 0818   BP: 117/80  105/65 110/67   BP Location: Right arm  Right arm Right arm   Pulse: 85 83 74 83   Resp: 18  18 18   Temp: 98.2 °F (36.8 °C)  98.9 °F (37.2 °C) 97.9 °F (36.6 °C)   TempSrc: Oral  Oral Oral   SpO2: 99%  99% 100%   Weight:       Height:           Intake/Output Summary (Last 24 hours) at 2024 0935  Last data filed at 2024 0742  Gross per 24 hour   Intake 1563.19 ml   Output --   Net 1563.19 ml           Physical Exam:    GENERAL:  Awake and alert, in no acute distress.  HEART:  Regular rhythm.  Regular rate  LUNGS:  Air entry was minimally decreased.  No crackles or wheezes   ABDOMEN: Soft and non-tender.    EXT: + edema of LLE, warm, well perfused  PSYCHIATRIC: Anxious mood    Diagnostic Data:    Labs:    Recent Labs   Lab 24  1056 24  1334 24  1833 24  0510   WBC 7.1  --  5.4 3.5*   HGB 9.5*  --  9.0* 9.0*   MCV 77.5*  --  76.1* 78.4*   .0  --  309.0 297.0  297.0   INR  --  1.09  --   --        Recent Labs   Lab 24  1056 24  1833 24  0510   * 103* 151*   BUN 13 14 10   CREATSERUM 0.65 0.54* 0.57   CA 8.9 9.0 8.8   ALB  --   --  4.2     137 138   K 4.4 3.9 4.6    107 109   CO2 24.0 23.0 22.0   ALKPHO  --   --  75   AST  --   --  16   ALT  --   --  16   BILT  --   --  0.2*   TP  --   --  6.8           Estimated Creatinine Clearance: 107.3 mL/min (based on SCr of 0.57 mg/dL).    Recent Labs   Lab 04/21/24  1334   PTP 14.8   INR 1.09            COVID-19  No results found for: \"COVID19\"    Pro-Calcitonin  No results for input(s): \"PCT\" in the last 168 hours.    Cardiac  No results for input(s): \"TROP\", \"PBNP\" in the last 168 hours.    Inflammatory Markers  No results for input(s): \"CRP\", \"FRIDA\", \"LDH\", \"DDIMER\" in the last 168 hours.    Culture:  No results found for this visit on 04/21/24.    CT CHEST PE AORTA (IV ONLY) (CPT=71260)    Result Date: 4/22/2024  CONCLUSION: No PE to the segmental level    Dictated by (CST): Leroy Kidd MD on 4/22/2024 at 7:01 AM     Finalized by (CST): Leroy Kidd MD on 4/22/2024 at 7:35 AM          US VENOUS DOPPLER LEG LEFT - DIAG IMG (CPT=93971)    Result Date: 4/21/2024  CONCLUSION:  1. Extensive DVT throughout the left lower extremity extending from the left external iliac vein to the left upper calf veins.    Dictated by (CST): Sagar Smith MD on 4/21/2024 at 11:59 AM     Finalized by (CST): Sagar Smith MD on 4/21/2024 at 12:00 PM          XR HIP W OR WO PELVIS 2 OR 3 VIEWS, LEFT (CPT=73502)    Result Date: 4/21/2024  CONCLUSION:   No acute fracture, dislocation, or significant osteoarthritis.    Dictated by (CST): Kenya Angel MD on 4/21/2024 at 11:17 AM     Finalized by (CST): Kenya Angel MD on 4/21/2024 at 11:18 AM                 Medications:    carBAMazepine ER  300 mg Oral Q12H    famotidine  20 mg Oral BID    fexofenadine  180 mg Oral BID    cetirizine  10 mg Oral Daily       Assessment & Plan:        Acute left lower extremity DVT  -Patient presenting with left lower extremity pain  -Patient initially with mottling and discoloration of left lower extremity  -US venous Doppler of  left leg noting extensive DVT from left external iliac vein to left upper calf veins.  -Continuing on heparin GTT  -IR consulted, recommend proceeding with venogram and thrombolysis.  Appreciate further recommendations  -CT chest reviewed.  Noted no signs of acute PE.  -Patient with reported history of IV contrast allergy.  Previous completed premedications prior to CT chest.  Plan for additional dosing prior to venogram and thrombolysis as above.    -Pain control as needed  -Continue to monitor     History of seizure disorder  -Continue home antiepileptics     Other medical problems include   Koolen-Agustina syndrome   Asthma   GERD  tethered cord syndrome   neurogenic bladder  Kyphoscoliosis   bilateral hip dysplasia status post bilateral varus derotation osteotomys  diaphragmatic hernia  mast cell activation syndrome         Plan of care discussed with patient and family at bedside and with Rn. Discussed management/test result(s) with IR consultant    Addendum: Mother expressed concerns about patient's medical complexity and requested transfer to [facility].  Transfer center contacted.  Discussed case with initial transfer team.    Addendum: Received phone call from hospitalist.  Recommended further discussion with hematology for determination of care.  Awaiting callback at this time.    ADDENDUM: Discussed case with Dr. Marquez, hematology, agree and accept transfer to [facility]. Transfer center notified.     Quality:  DVT Prophylaxis: Heparin  CODE status: Full  Estimated date of discharge: TBD  Discharge is dependent on: clinical stability    90 minutes spent discussing with other providers, examining patient, obtaining history, reviewing medical records, interpreting and communicating test results/imaging, ordering tests/medications, discussing plan of care and documenting information.      Jose Redd MD      This note was prepared using Dragon Medical voice recognition dictation  software. As a result errors may occur. When identified these errors have been corrected. While every attempt is made to correct errors during dictation discrepancies may still exist

## 2024-04-22 NOTE — TRANSFER CENTER NOTE
Spoke to [facility] transfer center, they will reach out to management concerning the transfer. Multiple attempts have been made to get through to a live person at [facility]. Attempted to call Radha Mosley who assisted this morning and confirmed the pre auth application but her mailbox is full at [facility].

## 2024-04-22 NOTE — CONSULTS
[facility]  part of [facility]    Report of Consultation    Nicky Nam Patient Status:  Inpatient    2005 MRN [id number]   Location [facility] 5SW/SE Attending Jose Redd MD   Hosp Day # 1 PCP Tu Yeung MD     Reason for Consultation:  Extensive left leg DVT.     History of Present Illness:  Nicky Nam is a a(n) 18 year old female who presents with left leg pain, swelling and reported mottling that started yesterday morning.  US show extensive DVT from upper calf veins to her external iliac vein.  Per her mother the pain and swelling has slightly improved since being admitted and started on heparin.    History:  Past Medical History:    Celiac disease (HCC)    Epilepsy (HCC)    Hearing loss    Hernia, diaphragmatic, congenital (HCC)    Hip dysplasia (HCC)    Mast cell activation (HCC)    Seizure disorder (HCC)    Urinary reflux    Vesicoureteral reflux     Past Surgical History:   Procedure Laterality Date    Other surgical history      diaphram hernia repair and uretral repair      No family history on file.   reports that she has never smoked. She has never used smokeless tobacco.    Allergies:  Allergies   Allergen Reactions    Radiology Contrast Iodinated Dyes ANAPHYLAXIS    Gluten Meal PAIN and FACE FLUSHING     Abdominal pain    Pineapple PAIN and FACE FLUSHING     Abdominal pain      Tomato PAIN and FACE FLUSHING     Abdominal pain     Augmentin [Amoxicillin-Pot Clavulanate] UNKNOWN     Serum sickness    Penicillins      serum sickness    Red Dye RASH     Medications:    Current Facility-Administered Medications:     heparin (Porcine) 50770 units/250mL infusion PE/DVT/THROMBUS CONTINUOUS, 200-3,000 Units/hr, Intravenous, Continuous    carBAMazepine ER (TEGRETOL XR) 12 hr tab 300 mg, 300 mg, Oral, Q12H    famotidine (Pepcid) tab 20 mg, 20 mg, Oral, BID    sodium chloride 0.9% infusion, , Intravenous, Continuous    traMADol (Ultram)  tab 50 mg, 50 mg, Oral, Q6H PRN    Allegra Allergy (Fexofenadine) 180mg 24 hr tablet - pt's own medication, 180 mg, Oral, BID    cetirizine (ZyrTEC) tab 10 mg, 10 mg, Oral, Daily    acetaminophen (Tylenol Extra Strength) tab 500 mg, 500 mg, Oral, Q4H PRN    Review of Systems:  Review of systems not obtained due to patient factors.  Spoke with patient's mother outside room per her request due to patient's anxiety.     Laboratory Data:  Lab Results   Component Value Date    WBC 3.5 04/22/2024    HGB 9.0 04/22/2024    HCT 29.1 04/22/2024    .0 04/22/2024    .0 04/22/2024    CREATSERUM 0.57 04/22/2024    BUN 10 04/22/2024     04/22/2024    K 4.6 04/22/2024     04/22/2024    CO2 22.0 04/22/2024     04/22/2024    CA 8.8 04/22/2024    ALB 4.2 04/22/2024    ALKPHO 75 04/22/2024    BILT 0.2 04/22/2024    TP 6.8 04/22/2024    AST 16 04/22/2024    ALT 16 04/22/2024    PTT 76.1 04/22/2024    INR 1.09 04/21/2024    MG 2.0 04/22/2024       Imaging:  CT CHEST PE AORTA (IV ONLY) (CPT=71260)    Result Date: 4/22/2024  CONCLUSION: No PE to the segmental level    Dictated by (CST): Leroy Kidd MD on 4/22/2024 at 7:01 AM     Finalized by (CST): Leroy Kidd MD on 4/22/2024 at 7:35 AM          US VENOUS DOPPLER LEG LEFT - DIAG IMG (CPT=93971)    Result Date: 4/21/2024  CONCLUSION:  1. Extensive DVT throughout the left lower extremity extending from the left external iliac vein to the left upper calf veins.    Dictated by (CST): Sagar Smith MD on 4/21/2024 at 11:59 AM     Finalized by (CST): Sagar Smith MD on 4/21/2024 at 12:00 PM          XR HIP W OR WO PELVIS 2 OR 3 VIEWS, LEFT (CPT=73502)    Result Date: 4/21/2024  CONCLUSION:   No acute fracture, dislocation, or significant osteoarthritis.    Dictated by (CST): Kenya Angel MD on 4/21/2024 at 11:17 AM     Finalized by (CST): Kenya Angel MD on 4/21/2024 at 11:18 AM           Impression:  Patient Active Problem List   Diagnosis     Acute deep vein thrombosis (DVT) of iliac vein of left lower extremity (HCC)       Assessment/Plan:  17yo admitted with extensive left leg DVT.  Chart reviewed, discussed case with patient's mother. Due to the burden of the DVT that extends up to the internal iliac, venogram with thrombectomy is recommended, This can be performed with anesthesia this afternoon  At this time [facility]      Thank you for allowing me to participate in the care of your patient.    VALERIE AGUIAR, APRN  4/22/2024  9:25 AM

## 2024-04-23 ENCOUNTER — ANESTHESIA EVENT (OUTPATIENT)
Dept: INTERVENTIONAL RADIOLOGY/VASCULAR | Facility: HOSPITAL | Age: 19
End: 2024-04-23
Payer: COMMERCIAL

## 2024-04-23 ENCOUNTER — APPOINTMENT (OUTPATIENT)
Dept: INTERVENTIONAL RADIOLOGY/VASCULAR | Facility: HOSPITAL | Age: 19
End: 2024-04-23
Attending: CLINICAL NURSE SPECIALIST
Payer: COMMERCIAL

## 2024-04-23 LAB
APTT PPP: 70.3 SECONDS (ref 23.3–35.6)
PLATELET # BLD AUTO: 260 10(3)UL (ref 150–450)

## 2024-04-23 PROCEDURE — B51D1ZZ FLUOROSCOPY OF BILATERAL LOWER EXTREMITY VEINS USING LOW OSMOLAR CONTRAST: ICD-10-PCS | Performed by: STUDENT IN AN ORGANIZED HEALTH CARE EDUCATION/TRAINING PROGRAM

## 2024-04-23 PROCEDURE — 99233 SBSQ HOSP IP/OBS HIGH 50: CPT | Performed by: INTERNAL MEDICINE

## 2024-04-23 PROCEDURE — B5191ZZ FLUOROSCOPY OF INFERIOR VENA CAVA USING LOW OSMOLAR CONTRAST: ICD-10-PCS | Performed by: STUDENT IN AN ORGANIZED HEALTH CARE EDUCATION/TRAINING PROGRAM

## 2024-04-23 RX ORDER — PROCHLORPERAZINE EDISYLATE 5 MG/ML
5 INJECTION INTRAMUSCULAR; INTRAVENOUS EVERY 8 HOURS PRN
Status: DISCONTINUED | OUTPATIENT
Start: 2024-04-23 | End: 2024-04-23 | Stop reason: HOSPADM

## 2024-04-23 RX ORDER — ONDANSETRON 2 MG/ML
4 INJECTION INTRAMUSCULAR; INTRAVENOUS EVERY 6 HOURS PRN
Status: DISCONTINUED | OUTPATIENT
Start: 2024-04-23 | End: 2024-04-30

## 2024-04-23 RX ORDER — LIDOCAINE HYDROCHLORIDE 20 MG/ML
INJECTION, SOLUTION INFILTRATION; PERINEURAL
Status: COMPLETED
Start: 2024-04-23 | End: 2024-04-23

## 2024-04-23 RX ORDER — LIDOCAINE HYDROCHLORIDE 10 MG/ML
INJECTION, SOLUTION EPIDURAL; INFILTRATION; INTRACAUDAL; PERINEURAL AS NEEDED
Status: DISCONTINUED | OUTPATIENT
Start: 2024-04-23 | End: 2024-04-23 | Stop reason: SURG

## 2024-04-23 RX ORDER — ROCURONIUM BROMIDE 10 MG/ML
INJECTION, SOLUTION INTRAVENOUS AS NEEDED
Status: DISCONTINUED | OUTPATIENT
Start: 2024-04-23 | End: 2024-04-23 | Stop reason: SURG

## 2024-04-23 RX ORDER — ONDANSETRON 2 MG/ML
4 INJECTION INTRAMUSCULAR; INTRAVENOUS EVERY 6 HOURS PRN
Status: DISCONTINUED | OUTPATIENT
Start: 2024-04-23 | End: 2024-04-23 | Stop reason: HOSPADM

## 2024-04-23 RX ORDER — NEOSTIGMINE METHYLSULFATE 1 MG/ML
INJECTION, SOLUTION INTRAVENOUS AS NEEDED
Status: DISCONTINUED | OUTPATIENT
Start: 2024-04-23 | End: 2024-04-23 | Stop reason: SURG

## 2024-04-23 RX ORDER — ONDANSETRON 2 MG/ML
INJECTION INTRAMUSCULAR; INTRAVENOUS AS NEEDED
Status: DISCONTINUED | OUTPATIENT
Start: 2024-04-23 | End: 2024-04-23 | Stop reason: SURG

## 2024-04-23 RX ORDER — HEPARIN SODIUM 1000 [USP'U]/ML
INJECTION, SOLUTION INTRAVENOUS; SUBCUTANEOUS
Status: DISCONTINUED
Start: 2024-04-23 | End: 2024-04-23 | Stop reason: WASHOUT

## 2024-04-23 RX ORDER — NALOXONE HYDROCHLORIDE 0.4 MG/ML
0.08 INJECTION, SOLUTION INTRAMUSCULAR; INTRAVENOUS; SUBCUTANEOUS AS NEEDED
Status: DISCONTINUED | OUTPATIENT
Start: 2024-04-23 | End: 2024-04-23 | Stop reason: HOSPADM

## 2024-04-23 RX ORDER — ACETAMINOPHEN 650 MG/1
650 SUPPOSITORY RECTAL EVERY 6 HOURS PRN
Status: DISCONTINUED | OUTPATIENT
Start: 2024-04-23 | End: 2024-04-30

## 2024-04-23 RX ORDER — DEXAMETHASONE SODIUM PHOSPHATE 4 MG/ML
VIAL (ML) INJECTION AS NEEDED
Status: DISCONTINUED | OUTPATIENT
Start: 2024-04-23 | End: 2024-04-23 | Stop reason: SURG

## 2024-04-23 RX ORDER — SODIUM CHLORIDE, SODIUM LACTATE, POTASSIUM CHLORIDE, CALCIUM CHLORIDE 600; 310; 30; 20 MG/100ML; MG/100ML; MG/100ML; MG/100ML
INJECTION, SOLUTION INTRAVENOUS CONTINUOUS PRN
Status: DISCONTINUED | OUTPATIENT
Start: 2024-04-23 | End: 2024-04-23 | Stop reason: SURG

## 2024-04-23 RX ORDER — MIDAZOLAM HYDROCHLORIDE 1 MG/ML
INJECTION INTRAMUSCULAR; INTRAVENOUS AS NEEDED
Status: DISCONTINUED | OUTPATIENT
Start: 2024-04-23 | End: 2024-04-23 | Stop reason: SURG

## 2024-04-23 RX ORDER — GLYCOPYRROLATE 0.2 MG/ML
INJECTION, SOLUTION INTRAMUSCULAR; INTRAVENOUS AS NEEDED
Status: DISCONTINUED | OUTPATIENT
Start: 2024-04-23 | End: 2024-04-23 | Stop reason: SURG

## 2024-04-23 RX ORDER — MIDAZOLAM HYDROCHLORIDE 1 MG/ML
INJECTION INTRAMUSCULAR; INTRAVENOUS
Status: COMPLETED
Start: 2024-04-23 | End: 2024-04-23

## 2024-04-23 RX ORDER — SODIUM CHLORIDE, SODIUM LACTATE, POTASSIUM CHLORIDE, CALCIUM CHLORIDE 600; 310; 30; 20 MG/100ML; MG/100ML; MG/100ML; MG/100ML
INJECTION, SOLUTION INTRAVENOUS CONTINUOUS
Status: DISCONTINUED | OUTPATIENT
Start: 2024-04-23 | End: 2024-04-23 | Stop reason: HOSPADM

## 2024-04-23 RX ADMIN — SODIUM CHLORIDE, SODIUM LACTATE, POTASSIUM CHLORIDE, CALCIUM CHLORIDE: 600; 310; 30; 20 INJECTION, SOLUTION INTRAVENOUS at 11:44:00

## 2024-04-23 RX ADMIN — SODIUM CHLORIDE, SODIUM LACTATE, POTASSIUM CHLORIDE, CALCIUM CHLORIDE: 600; 310; 30; 20 INJECTION, SOLUTION INTRAVENOUS at 13:09:00

## 2024-04-23 RX ADMIN — MIDAZOLAM HYDROCHLORIDE 2 MG: 1 INJECTION INTRAMUSCULAR; INTRAVENOUS at 11:26:00

## 2024-04-23 RX ADMIN — SODIUM CHLORIDE, SODIUM LACTATE, POTASSIUM CHLORIDE, CALCIUM CHLORIDE: 600; 310; 30; 20 INJECTION, SOLUTION INTRAVENOUS at 11:26:00

## 2024-04-23 RX ADMIN — ONDANSETRON 4 MG: 2 INJECTION INTRAMUSCULAR; INTRAVENOUS at 13:02:00

## 2024-04-23 RX ADMIN — GLYCOPYRROLATE 0.6 MG: 0.2 INJECTION, SOLUTION INTRAMUSCULAR; INTRAVENOUS at 13:09:00

## 2024-04-23 RX ADMIN — LIDOCAINE HYDROCHLORIDE 50 MG: 10 INJECTION, SOLUTION EPIDURAL; INFILTRATION; INTRACAUDAL; PERINEURAL at 11:34:00

## 2024-04-23 RX ADMIN — SODIUM CHLORIDE, SODIUM LACTATE, POTASSIUM CHLORIDE, CALCIUM CHLORIDE: 600; 310; 30; 20 INJECTION, SOLUTION INTRAVENOUS at 12:34:00

## 2024-04-23 RX ADMIN — DEXAMETHASONE SODIUM PHOSPHATE 4 MG: 4 MG/ML VIAL (ML) INJECTION at 11:32:00

## 2024-04-23 RX ADMIN — NEOSTIGMINE METHYLSULFATE 3 MG: 1 INJECTION, SOLUTION INTRAVENOUS at 13:09:00

## 2024-04-23 RX ADMIN — ROCURONIUM BROMIDE 20 MG: 10 INJECTION, SOLUTION INTRAVENOUS at 11:34:00

## 2024-04-23 NOTE — ANESTHESIA PREPROCEDURE EVALUATION
Anesthesia PreOp Note    HPI:     Nicky Nam is a 18 year old female who presents for preoperative consultation requested by: * No surgeons listed *    Date of Surgery: 4/23/2024    * No procedures listed *  Indication: * No pre-op diagnosis entered *    Relevant Problems   No relevant active problems       NPO:  Last Liquid Consumption Date: 04/22/24  Last Liquid Consumption Time: 2330  Last Solid Consumption Date: 04/22/24  Last Solid Consumption Time: 1800  Last Liquid Consumption Date: 04/22/24          History Review:  Patient Active Problem List    Diagnosis Date Noted    Acute deep vein thrombosis (DVT) of iliac vein of left lower extremity (HCC) 04/21/2024       Past Medical History:    Celiac disease (HCC)    Epilepsy (HCC)    Hearing loss    Hernia, diaphragmatic, congenital (HCC)    Hip dysplasia (HCC)    Mast cell activation (HCC)    Seizure disorder (HCC)    Urinary reflux    Vesicoureteral reflux       Past Surgical History:   Procedure Laterality Date    Other surgical history      diaphram hernia repair and uretral repair        Medications Prior to Admission   Medication Sig Dispense Refill Last Dose    fexofenadine 180 MG Oral Tab Take 1 tablet (180 mg total) by mouth 2 (two) times daily. Mast cell   4/21/2024 at 0900    famotidine 20 MG Oral Tab Take 1 tablet (20 mg total) by mouth 2 (two) times daily.   4/21/2024 at 0900    sodium chloride 1 g Oral Tab Take 1 tablet (1 g total) by mouth 3 (three) times daily with meals.   4/21/2024 at 1330    cetirizine 10 MG Oral Tab Take 1 tablet (10 mg total) by mouth as needed for Allergies.   4/21/2024 at 1330    Cholecalciferol (VITAMIN D) 50 MCG (2000 UT) Oral Cap Take 1 capsule (2,000 Units total) by mouth daily.   4/21/2024 at 0900    NON FORMULARY Take 180 mg by mouth in the morning and 180 mg before bedtime. Allegra non-drowsy 180 mg BID .   4/21/2024    CarBAMazepine  MG Oral Capsule SR 12 Hr   8 4/21/2024 at 0800    EPINEPHrine  (EPIPEN 2-KARTHIK) 0.3 MG/0.3ML Injection Solution Auto-injector Inject IM in event of  allergic reaction. Dispense twin pack, ok to dispense generic Mylan if ok with parent 2 each 0     diazepam 10 MG Rectal Gel I 7.5 MG RECTALLY PRF SEIZURE  0 Unknown    FLUVIRIN Intramuscular Suspension ADM 0.5ML IM UTD  0      Current Facility-Administered Medications Ordered in Epic   Medication Dose Route Frequency Provider Last Rate Last Admin    ondansetron (Zofran) 4 MG/2ML injection 4 mg  4 mg Intravenous Q6H PRN Kirsten Castillo MD        acetaminophen (Tylenol) rectal suppository 650 mg  650 mg Rectal Q6H PRN Kirsten Castillo MD        predniSONE (Deltasone) tab 50 mg  50 mg Oral q6h Shawanda Staley APRN   50 mg at 04/23/24 0530    heparin (Porcine) 01626 units/250mL infusion PE/DVT/THROMBUS CONTINUOUS  200-3,000 Units/hr Intravenous Continuous Aidan Carroll MD 9 mL/hr at 04/22/24 1910 900 Units/hr at 04/22/24 1910    carBAMazepine ER (TEGRETOL XR) 12 hr tab 300 mg  300 mg Oral Q12H Jose Redd MD   300 mg at 04/23/24 0835    famotidine (Pepcid) tab 20 mg  20 mg Oral BID Jose Redd MD   20 mg at 04/23/24 0836    sodium chloride 0.9% infusion   Intravenous Continuous Jose Redd  mL/hr at 04/21/24 1823 New Bag at 04/21/24 1823    traMADol (Ultram) tab 50 mg  50 mg Oral Q6H PRN Jose Redd MD        Allegra Allergy (Fexofenadine) 180mg 24 hr tablet - pt's own medication  180 mg Oral BID Jose Redd MD   180 mg at 04/23/24 0836    cetirizine (ZyrTEC) tab 10 mg  10 mg Oral Daily Jose Redd MD   10 mg at 04/23/24 0930    acetaminophen (Tylenol Extra Strength) tab 500 mg  500 mg Oral Q4H PRN Nan Veliz MD   500 mg at 04/22/24 1256     No current Epic-ordered outpatient medications on file.       Allergies   Allergen Reactions    Radiology Contrast Iodinated Dyes ANAPHYLAXIS    Gluten Meal PAIN and FACE FLUSHING     Abdominal pain    Pineapple PAIN and FACE  FLUSHING     Abdominal pain      Tomato PAIN and FACE FLUSHING     Abdominal pain     Augmentin [Amoxicillin-Pot Clavulanate] UNKNOWN     Serum sickness    Penicillins      serum sickness    Red Dye RASH       No family history on file.  Social History     Socioeconomic History    Marital status: Single   Tobacco Use    Smoking status: Never    Smokeless tobacco: Never    Tobacco comments:     No household smokers.        Available pre-op labs reviewed.  Lab Results   Component Value Date    WBC 3.5 (L) 04/22/2024    RBC 3.71 (L) 04/22/2024    HGB 9.0 (L) 04/22/2024    HCT 29.1 (L) 04/22/2024    MCV 78.4 (L) 04/22/2024    MCH 24.3 (L) 04/22/2024    MCHC 30.9 (L) 04/22/2024    RDW 14.6 04/22/2024    .0 04/23/2024     Lab Results   Component Value Date     04/22/2024    K 4.6 04/22/2024     04/22/2024    CO2 22.0 04/22/2024    BUN 10 04/22/2024    CREATSERUM 0.57 04/22/2024     (H) 04/22/2024    CA 8.8 04/22/2024     Lab Results   Component Value Date    INR 1.09 04/21/2024       Vital Signs:  Body mass index is 19.07 kg/m².   height is 1.499 m (4' 11\") and weight is 42.8 kg (94 lb 6.4 oz). Her oral temperature is 98.7 °F (37.1 °C). Her blood pressure is 112/69 and her pulse is 105. Her respiration is 16 and oxygen saturation is 96%.   Vitals:    04/22/24 1900 04/22/24 2105 04/23/24 0529 04/23/24 0840   BP:  108/63 100/54 112/69   Pulse: 97 96  105   Resp:  18 16 16   Temp:  98.7 °F (37.1 °C) 98.4 °F (36.9 °C) 98.7 °F (37.1 °C)   TempSrc:  Oral Oral Oral   SpO2:  100% 100% 96%   Weight:       Height:            Anesthesia Evaluation      Airway   Mallampati: II  TM distance: >3 FB  Neck ROM: full  Dental          Pulmonary - negative ROS and normal exam   Cardiovascular - normal exam  (+) valvular problems/murmurs MVP    Neuro/Psych    (+)  seizures,        GI/Hepatic/Renal      Comments: Past diaphragmatic hernia repair- with some residual lung problems    Endo/Other      Comments: Mast cell  degranulation syndrome  Abdominal                  Anesthesia Plan:   ASA:  3  Plan:   General  Plan Comments: I have discussed the anesthetic plan, major risks and alternatives with the patient/ mother and answered all questions. The patient desires to proceed with surgery and anesthesia as planned.     Informed Consent Plan and Risks Discussed With:  Patient and mother      I have informed Nicky Nam and/or legal guardian or family member of the nature of the anesthetic plan, benefits, risks including possible dental damage if relevant, major complications, and any alternative forms of anesthetic management.   All of the patient's questions were answered to the best of my ability. The patient desires the anesthetic management as planned.  TAMIME GARBER DO  4/23/2024 11:15 AM  Present on Admission:  **None**

## 2024-04-23 NOTE — PLAN OF CARE
Patient is Aox4, family at bedside. Heparin GTT Patient came back from cath lab. Bilateral popliteal incisions clean dry and intact. Call light within reach. Fall precautions maintained.          Problem: SKIN/TISSUE INTEGRITY - ADULT  Goal: Skin integrity remains intact  Description: INTERVENTIONS  - Assess and document risk factors for pressure ulcer development  - Assess and document skin integrity  - Monitor for areas of redness and/or skin breakdown  - Initiate interventions, skin care algorithm/standards of care as needed  Outcome: Progressing     Problem: HEMATOLOGIC - ADULT  Goal: Maintains hematologic stability  Description: INTERVENTIONS  - Assess for signs and symptoms of bleeding or hemorrhage  - Monitor labs and vital signs for trends  - Administer supportive blood products/factors, fluids and medications as ordered and appropriate  - Administer supportive blood products/factors as ordered and appropriate  4/23/2024 1807 by Yessy Delaney RN  Outcome: Progressing  4/23/2024 1124 by Yessy Delaney RN  Outcome: Progressing     Problem: PAIN - ADULT  Goal: Verbalizes/displays adequate comfort level or patient's stated pain goal  Description: INTERVENTIONS:  - Encourage pt to monitor pain and request assistance  - Assess pain using appropriate pain scale  - Administer analgesics based on type and severity of pain and evaluate response  - Implement non-pharmacological measures as appropriate and evaluate response  - Consider cultural and social influences on pain and pain management  - Manage/alleviate anxiety  - Utilize distraction and/or relaxation techniques  - Monitor for opioid side effects  - Notify MD/LIP if interventions unsuccessful or patient reports new pain  - Anticipate increased pain with activity and pre-medicate as appropriate  4/23/2024 1807 by Yessy Delaney, RN  Outcome: Progressing  4/23/2024 1124 by Yessy Delaney RN  Outcome: Progressing

## 2024-04-23 NOTE — PROGRESS NOTES
[facility]  part of [facility]  Progress Note    Nicky Nam Patient Status:  Inpatient    2005 MRN [id number]   Location [facility] 5SW/SE Attending Jose Redd MD   Hosp Day # 2 PCP Tu Yeung MD       Subjective:   Left leg feels \"like wood\".      Objective:   Physical Exam  Constitutional:       Appearance: Normal appearance.   Cardiovascular:      Rate and Rhythm: Normal rate.      Pulses:           Dorsalis pedis pulses are 2+ on the left side.   Pulmonary:      Effort: No respiratory distress.   Musculoskeletal:      Left lower le+ Edema present.   Neurological:      Mental Status: She is alert.        Blood pressure 112/69, pulse 96, temperature 98.7 °F (37.1 °C), temperature source Oral, resp. rate 16, height 59\", weight 94 lb 6.4 oz (42.8 kg), last menstrual period 2024, SpO2 96%.    Results:   Labs:  Lab Results   Component Value Date    .0 2024       Microbiology:  No results for input(s): \"URINE\", \"CULTI\", \"BLDSMR\" in the last 168 hours.    CT CHEST PE AORTA (IV ONLY) (CPT=71260)    Result Date: 2024  CONCLUSION: No PE to the segmental level    Dictated by (CST): Leroy Kidd MD on 2024 at 7:01 AM     Finalized by (CST): Leroy Kidd MD on 2024 at 7:35 AM          US VENOUS DOPPLER LEG LEFT - DIAG IMG (CPT=93971)    Result Date: 2024  CONCLUSION:  1. Extensive DVT throughout the left lower extremity extending from the left external iliac vein to the left upper calf veins.    Dictated by (CST): Sagar Smith MD on 2024 at 11:59 AM     Finalized by (CST): Sagar Smith MD on 2024 at 12:00 PM          XR HIP W OR WO PELVIS 2 OR 3 VIEWS, LEFT (CPT=73502)    Result Date: 2024  CONCLUSION:   No acute fracture, dislocation, or significant osteoarthritis.    Dictated by (CST): Kenya Angel MD on 2024 at 11:17 AM     Finalized by (CST): Kenya Angel MD on 2024 at  11:18 AM            Assessment and Plan:   Left leg DVT - swelling has slightly worsened in the past 24 hours.  Nicky has not been out of bed.  Plan is for left leg venogram, thrombectomy and possible.  Discussed procedure including benefits and risks with her and her mother.  They state understanding and agree to proceed.  All questions were answered.       VALERIE AGUIAR, APRN  4/23/2024

## 2024-04-23 NOTE — PAYOR COMM NOTE
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  The patient reports onset of to her left hip this morning.  The pain is worsened with standing and moving.  She was noted to have bluish discoloration to her left lower extremity today prompting her parents to bring her to the emergency department.  She denies any knee or leg pain.  The pain is primarily to her hip and upper thigh.  She denies any recent injuries.  She does complain of some lightheadedness.  The patient is presently on oral contraception.    Social Determinants of Health     Financial Resource Strain: Low Risk  (6/7/2022)    [facility]    Overall Financial Resource Strain (CARDIA)     Difficulty of Paying Living Expenses: Not hard at all    Received from [facility]    Food Insecurity    Received from [facility]    Transportation Needs   Stress: No Stress Concern Present (6/7/2022)    [facility]    [employer] [place] [employer] - Occupational Stress  Questionnaire     Feeling of Stress : Only a little    [facility]    Social Connections   Housing Stability: Low Risk  (12/15/2023)    [facility]    Housing Stability Vital Sign     Unable to Pay for Housing in the Last Year: No     Number of Places Lived in the Last Year: 1     In the last 12 months, was there a time when you did not have a steady place to sleep or slept in a shelter (including now)?: No       Review of Systems    Positive for stated complaint: Left leg pain    Physical Exam     ED Triage Vitals [04/21/24 1023]   BP 97/64   Pulse 94   Resp 18   Temp 97.6 °F (36.4 °C)   Temp src Temporal   SpO2 100 %   O2 Device None (Room air)     Current:BP (!) 123/92   Pulse 101   Temp 97.6 °F (36.4 °C) (Temporal)   Resp 14   Ht 149.9 cm (4' 11\")   Wt 46.9 kg   SpO2 100%   BMI 20.88 kg/m²     Physical Exam      Musculoskeletal: neck is supple non tender        Bluish discoloration noted to the entirety of the left lower extremity.  No tenderness to palpation of the leg.  There is some tenderness to palpation and range of motion of the left hip.  No edema noted.  Bilateral dorsalis pedis pulses audible by Doppler.  Psychiatric: patient is oriented X 3, there is no agitation    DIFFERENTIAL DIAGNOSIS: After history and physical exam differential diagnosis was considered for deep vein thrombosis or other    ED Course         MDM      Lab and ultrasound results noted.  Patient with extensive deep vein thrombosis.  Unable to obtain a CT of the chest without prepping the patient given her mast cell activation syndrome.  Will begin IV heparin and admit for further evaluation.  Discussed with Dr. Redd, hospitalist.  Also discussed with Dr. Collins, interventional radiology.  He requests that the patient obtain a CT of the chest through the abdomen/pelvis.  He also request that the patient be  kept n.p.o. after midnight and be prepped for likely intervention tomorrow.  Admission disposition: 2024  2:01 PM      Medical Decision Making      Disposition and Plan     Clinical Impression:  1. Acute deep vein thrombosis (DVT) of iliac vein of left lower extremity (HCC)         Disposition:  Admit  2024  2:01 pm                          Nicky Nam Patient Status:  Emergency    2005  18 year old CSN [id number]   Location  Attending Aidan Carroll MD     PCP Tu Yeung MD         DATE OF ADMISSION: 2024     CHIEF COMPLAINT: Left leg pain    HISTORY OF PRESENT ILLNESS  This is a 18 year oldfemale who presented complaining of left leg pain.  Patient stated pain began on morning of admission.  Pain was mostly located in her left hip and thigh.  Pain was worsened with bearing weight and moving.  Mother at bedside noted development of bluish discoloration prompting visit to ED.  Upon further review, mother did state patient has been having an abnormal gait for the past day or so.  At time of interview, patient stated pain was still present, but improved.  Pain was moving down into her left calf.  Patient denied chest pain or shortness of breath.    Of note, patient has history of mast cell activation syndrome.and Koolen winston syndrome.  Patient's mother denies known history of family clotting disorders.  Mother did note \" possible weak positivity\" for lupus anticoagulant.    PAST MEDICAL HISTORY  Past Medical History:    Celiac disease (HCC)    Epilepsy (HCC)    Hearing loss    Hernia, diaphragmatic, congenital (HCC)    Hip dysplasia (HCC)    Seizure disorder (HCC)    Urinary reflux    Vesicoureteral reflux        PAST SURGICAL HISTORY  Past Surgical History:   Procedure Laterality Date    Other surgical history      diaphram hernia repair and uretral repair        ALLERGIES   Augmentin [amoxicillin-pot clavulanate], Penicillins, and Red dye    MEDICATIONS  Patient's  Medications   New Prescriptions    No medications on file   Previous Medications    AZITHROMYCIN (ZITHROMAX Z-KARTHIK) 250 MG ORAL TAB    500 mg once followed by 250 mg daily x 4 days    CARBAMAZEPINE  MG ORAL CAPSULE SR 12 HR        DIAZEPAM 10 MG RECTAL GEL    I 7.5 MG RECTALLY PRF SEIZURE    EPINEPHRINE (EPIPEN 2-KARTHIK) 0.3 MG/0.3ML INJECTION SOLUTION AUTO-INJECTOR    Inject IM in event of  allergic reaction. Dispense twin pack, ok to dispense generic Mylan if ok with parent    FLUVIRIN INTRAMUSCULAR SUSPENSION    ADM 0.5ML IM UTD    FOCALIN XR 10 MG ORAL CAPSULE SR 24 HR        MICROGESTIN 1.5/30 1.5-30 MG-MCG ORAL TAB    TAKE ONE TABLET BY MOUTH DAILY. SKIP PLACEBOS FOR ABNORMAL UTERINE BLEEDING   Modified Medications    No medications on file   Discontinued Medications    No medications on file       SOCIAL HISTORY  Social History     Socioeconomic History    Marital status: Single   Tobacco Use    Smoking status: Never    Smokeless tobacco: Never    Tobacco comments:     No household smokers.        FAMILY HISTORY  No family history on file.    PHYSICAL EXAM  Vital signs:  /73   Pulse 96   Temp 97.6 °F (36.4 °C) (Temporal)   Resp 21   Ht 4' 11\" (1.499 m)   Wt 103 lb 6.3 oz (46.9 kg)   SpO2 98%   BMI 20.88 kg/m²      GENERAL:  Awake and alert, in no acute distress.  HEART:  Regular rhythm.  Mild tachycardia  LUNGS:  Air entry was minimally decreased.  No crackles or wheezes   ABDOMEN: Soft and non-tender.    PSYCHIATRIC: Normal mood      IMAGING  US VENOUS DOPPLER LEG LEFT - DIAG IMG (CPT=93971)    Result Date: 4/21/2024  CONCLUSION:  1. Extensive DVT throughout the left lower extremity extending from the left external iliac vein to the left upper calf veins.    Dictated by (CST): Sagar Smith MD on 4/21/2024 at 11:59 AM     Finalized by (CST): Sagar Smith MD on 4/21/2024 at 12:00 PM            ASSESSMENT/PLAN    Acute left lower extremity DVT  -Patient presenting with left lower  extremity pain  -Patient began to have mottling and discoloration of left lower extremity  -US venous Doppler of left leg noting extensive DVT from left external iliac vein to left upper calf veins.  -Starting heparin GTT  -IR consulted in ED, planning to reevaluate for need of catheter directed thrombolytics.  Recommend keeping patient n.p.o. after midnight.  Appreciate further recommendations  -Pain control as needed  -Patient with reported history of IV contrast allergy.  Starting on premedications in order to check CT for signs of PE.  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  Discussed with ED physician and RN. Decision made that pt needs hospitalization for further management/monitoring.      Jose Redd MD        4/22 IR    Reason for Consultation:  Extensive left leg DVT.      History of Present Illness:  Nicky Nam is a a(n) 18 year old female who presents with left leg pain, swelling and reported mottling that started yesterday morning.  US show extensive DVT from upper calf veins to her external iliac vein.  Per her mother the pain and swelling has slightly improved since being admitted and started on heparin.     Assessment/Plan:  17yo admitted with extensive left leg DVT.  Chart reviewed, discussed case with patient's mother. Due to the burden of the DVT that extends up to the internal iliac, venogram with thrombectomy is recommended, This can be performed with anesthesia this afternoon  At this time they are asking that Nicky be transferred to [facility] due to her medical history.       Thank you  for allowing me to participate in the care of your patient.     VALERIE AGUIAR, APRN      4/22 Internal Medicine     Acute left lower extremity DVT  -Patient presenting with left lower extremity pain  -Patient initially with mottling and discoloration of left lower extremity  -US venous Doppler of left leg noting extensive DVT from left external iliac vein to left upper calf veins.  -Continuing on heparin GTT  -IR consulted, recommend proceeding with venogram and thrombolysis.  Appreciate further recommendations  -CT chest reviewed.  Noted no signs of acute PE.  -Patient with reported history of IV contrast allergy.  Previous completed premedications prior to CT chest.  Plan for additional dosing prior to venogram and thrombolysis as above.    -Pain control as needed  -Continue to monitor    Addendum: Mother expressed concerns about patient's medical complexity and requested transfer to [facility].  Transfer center contacted.  Discussed case with initial transfer team.     Addendum: Received phone call from hospitalist.  Recommended further discussion with hematology for determination of care.  Awaiting callback at this time.     ADDENDUM: Discussed case with Dr. Marquez, hematology, agree and accept transfer to [facility]. Transfer center notified.       4/22 CM  Spoke to [facility] transfer center, they will reach out to management concerning the transfer. Multiple attempts have been made to get through to a live person at [facility]. Attempted to call Radha Mosley who assisted this morning and confirmed the pre auth application but her mailbox is full at [facility].     Spoke to Pastor with [facility] concerning the request for authorization for transfer. She stated that [place] cannot request an auth for [facility] and it has to be [facility] to request the authorization. She stated the auth that was requested this morning was for this current hospitalization, which was not correct. The request was for the  transfer to [facility]       IR     Cardiovascular:      Rate and Rhythm: Normal rate.      Pulses:           Dorsalis pedis pulses are 2+ on the left side.   Pulmonary:      Effort: No respiratory distress.   Musculoskeletal:      Left lower le+ Edema present.   Neurological:      Mental Status: She is alert.   eft leg DVT - swelling has slightly worsened in the past 24 hours.  Nicky has not been out of bed.  Plan is for left leg venogram, thrombectomy and possible.  Discussed procedure including benefits and risks with her and her mother.  They state understanding and agree to proceed.  All questions were answered.         VALERIE AGUIAR, APRN  2024 [facility]  Mother and family at bedside.  Patient reports some increased pain and swelling of left lower extremity overnight, but improving today.  Continues to have lower extremity swelling.   Patient denied current chest pain, shortness of breath, nausea or vomiting.     Assessment & Plan:  Acute left lower extremity DVT  -Patient presenting with left lower extremity pain  -Patient initially with mottling and discoloration of left lower extremity  -US venous Doppler of left leg noting extensive DVT from left external iliac vein to left upper calf veins.  -Continuing on heparin GTT  -IR consulted, recommend proceeding with venogram and thrombolysis.  Plan for .  Appreciate further recommendations  -CT chest reviewed.  Noted no signs of acute PE.  -Patient with reported history of IV contrast allergy.  Previous completed premedications prior to CT chest.  Plan for additional dosing prior to venogram and thrombolysis as above.    -Pain control as needed  -Continue to monitor     History of seizure disorder  -Continue home antiepileptics     Other medical problems include   Koolen-Agustina syndrome   Asthma   GERD  tethered cord syndrome   neurogenic bladder  Kyphoscoliosis   bilateral hip dysplasia status  post bilateral varus derotation osteotomys  diaphragmatic hernia  mast cell activation syndrome      Plan of care discussed with patient and family at bedside and with Rn. Discussed management/test result(s) with IR consultant               iagnostic Data:    Labs:             Recent Labs   Lab 04/21/24  1056 04/21/24  1334 04/21/24  1833 04/22/24  0510 04/23/24  0614   WBC 7.1  --  5.4 3.5*  --    HGB 9.5*  --  9.0* 9.0*  --    MCV 77.5*  --  76.1* 78.4*  --    .0  --  309.0 297.0  297.0 260.0   INR  --  1.09  --   --   --              Recent Labs   Lab 04/21/24  1056 04/21/24  1833 04/22/24  0510   * 103* 151*   BUN 13 14 10   CREATSERUM 0.65 0.54* 0.57   CA 8.9 9.0 8.8   ALB  --   --  4.2    137 138   K 4.4 3.9 4.6    107 109   CO2 24.0 23.0 22.0   ALKPHO  --   --  75   AST  --   --  16   ALT  --   --  16   BILT  --   --  0.2*   TP  --   --  6.8       Estimated Creatinine Clearance: 107.3 mL/min (based on SCr of 0.57 mg/dL).         Recent Labs   Lab 04/21/24  1334   PTP 14.8   INR 1.09         MEDICATIONS ADMINISTERED IN LAST 1 DAY:  acetaminophen (Tylenol Extra Strength) tab 500 mg       Date Action Dose Route User    4/22/2024 1256 Given 500 mg Oral RupeshyrYessy rae RN          carBAMazepine ER (TEGRETOL XR) 12 hr tab 300 mg       Date Action Dose Route User    4/23/2024 0835 Given 300 mg Oral Yessy Delaney RN    4/22/2024 2106 Given 300 mg Oral Jacqueline Yousif RN          cetirizine (ZyrTEC) tab 10 mg       Date Action Dose Route User    4/23/2024 0930 Given 10 mg Oral Yessy Delaney RN          heparin (Porcine) 01327 units/250mL infusion PE/DVT/THROMBUS CONTINUOUS       Date Action Dose Route User    4/22/2024 1910 New Bag 900 Units/hr Intravenous Yessy Delaney, RN          dexamethasone (Decadron) 4 MG/ML injection       Date Action Dose Route User    4/23/2024 1132 Given 4 mg Intravenous Mor Matias DO          famotidine (Pepcid) tab 20 mg       Date Action Dose Route User     4/23/2024 0836 Given 20 mg Oral ObyrtalYessy RN    4/22/2024 2106 Given 20 mg Oral Jacqueline Yousif RN          Allegra Allergy (Fexofenadine) 180mg 24 hr tablet - pt's own medication       Date Action Dose Route User    4/23/2024 0836 Given 180 mg Oral ObyrtalYessy RN    4/22/2024 2106 Given 180 mg Oral Jacqueline Yousif RN          lactated ringers infusion       Date Action Dose Route User    4/23/2024 1126 New Bag (none) Intravenous Mor Matias DO          lidocaine PF (Xylocaine-MPF) 1% injection       Date Action Dose Route User    4/23/2024 1134 Given 50 mg Intravenous Mor Matias DO          midazolam (Versed) 2 MG/2ML injection       Date Action Dose Route User    4/23/2024 1126 Given 2 mg Intravenous Mor Matias DO          propofol (Diprivan) 10 MG/ML injection       Date Action Dose Route User    4/23/2024 1134 Given 170 mg Intravenous oMr Matias DO          rocuronium (Zemuron) 50 mg/5mL injection       Date Action Dose Route User    4/23/2024 1134 Given 20 mg Intravenous Mor Matias DO          predniSONE (Deltasone) tab 50 mg       Date Action Dose Route User    4/23/2024 0530 Given 50 mg Oral Jacqueline Yousif RN            Vitals (last day)       Date/Time Temp Pulse Resp BP SpO2 Weight O2 Device O2 Flow Rate (L/min) [facility]    04/23/24 0840 98.7 °F (37.1 °C) 105 16 112/69 96 % -- None (Room air) -- AO    04/23/24 0529 98.4 °F (36.9 °C) -- 16 100/54 100 % -- None (Room air) -- CG    04/22/24 2105 98.7 °F (37.1 °C) 96 18 108/63 100 % -- None (Room air) -- CG    04/22/24 1900 -- 97 -- -- -- -- -- -- CY    04/22/24 1718 98.4 °F (36.9 °C) -- 18 105/70 100 % -- None (Room air) -- AO    04/22/24 0818 97.9 °F (36.6 °C) 83 18 110/67 100 % -- None (Room air) -- AO    04/22/24 0501 98.9 °F (37.2 °C) 74 18 105/65 99 % -- None (Room air) -- CG

## 2024-04-23 NOTE — ANESTHESIA PROCEDURE NOTES
Airway  Date/Time: 4/23/2024 11:37 AM  Urgency: Elective      General Information and Staff    Patient location during procedure: OR  Anesthesiologist: Mor Matias DO  Performed: anesthesiologist   Performed by: Mor Matias DO  Authorized by: Mor Matias DO      Indications and Patient Condition  Indications for airway management: anesthesia  Sedation level: deep  Preoxygenated: yes  Patient position: sniffing  Mask difficulty assessment: 1 - vent by mask    Final Airway Details  Final airway type: endotracheal airway      Successful airway: ETT  Cuffed: yes   Successful intubation technique: direct laryngoscopy  Endotracheal tube insertion site: oral  Blade type: [place].  Blade size: #3  ETT size (mm): 6.5    Placement verified by: capnometry   Measured from: lips  ETT to lips (cm): 20  Number of attempts at approach: 1  Number of other approaches attempted: 0

## 2024-04-23 NOTE — PLAN OF CARE
Patient is Aox4, NPO, Heparin GTT, mother of the patient is the POA, consent for the procedure signed by her. Call light within reach. Fall precautions maintained.     Problem: HEMATOLOGIC - ADULT  Goal: Maintains hematologic stability  Description: INTERVENTIONS  - Assess for signs and symptoms of bleeding or hemorrhage  - Monitor labs and vital signs for trends  - Administer supportive blood products/factors, fluids and medications as ordered and appropriate  - Administer supportive blood products/factors as ordered and appropriate  Outcome: Progressing     Problem: PAIN - ADULT  Goal: Verbalizes/displays adequate comfort level or patient's stated pain goal  Description: INTERVENTIONS:  - Encourage pt to monitor pain and request assistance  - Assess pain using appropriate pain scale  - Administer analgesics based on type and severity of pain and evaluate response  - Implement non-pharmacological measures as appropriate and evaluate response  - Consider cultural and social influences on pain and pain management  - Manage/alleviate anxiety  - Utilize distraction and/or relaxation techniques  - Monitor for opioid side effects  - Notify MD/LIP if interventions unsuccessful or patient reports new pain  - Anticipate increased pain with activity and pre-medicate as appropriate  Outcome: Progressing

## 2024-04-23 NOTE — PLAN OF CARE
Problem: Patient Centered Care  Goal: Patient preferences are identified and integrated in the patient's plan of care  Description: Interventions:  - What would you like us to know as we care for you? Home with family   - Provide timely, complete, and accurate information to patient/family  - Incorporate patient and family knowledge, values, beliefs, and cultural backgrounds into the planning and delivery of care  - Encourage patient/family to participate in care and decision-making at the level they choose  - Honor patient and family perspectives and choices  Outcome: Progressing     Problem: Patient/Family Goals  Goal: Patient/Family Long Term Goal  Description: Patient's Long Term Goal: resolve DVT     Interventions:  - Monitor vitals  - Monitor appropriate labs  - Administer medications as ordered  - Follow MD's orders  - Update patient on plan of care   - Discharge planning     - See additional Care Plan goals for specific interventions  Outcome: Progressing  Goal: Patient/Family Short Term Goal  Description: Patient's Short Term Goal: Transfer to [facility]     Interventions:   - Social work consult   - Follow doctor's orders   - See additional Care Plan goals for specific interventions  Outcome: Progressing     Problem: SKIN/TISSUE INTEGRITY - ADULT  Goal: Skin integrity remains intact  Description: INTERVENTIONS  - Assess and document risk factors for pressure ulcer development  - Assess and document skin integrity  - Monitor for areas of redness and/or skin breakdown  - Initiate interventions, skin care algorithm/standards of care as needed  Outcome: Progressing     Problem: HEMATOLOGIC - ADULT  Goal: Maintains hematologic stability  Description: INTERVENTIONS  - Assess for signs and symptoms of bleeding or hemorrhage  - Monitor labs and vital signs for trends  - Administer supportive blood products/factors, fluids and medications as ordered and appropriate  - Administer supportive blood products/factors as  ordered and appropriate  Outcome: Progressing  Goal: Free from bleeding injury  Description: (Example usage: patient with low platelets)  INTERVENTIONS:  - Avoid intramuscular injections, enemas and rectal medication administration  - Ensure safe mobilization of patient  - Hold pressure on venipuncture sites to achieve adequate hemostasis  - Assess for signs and symptoms of internal bleeding  - Monitor lab trends  - Patient is to report abnormal signs of bleeding to staff  - Avoid use of toothpicks and dental floss  - Use electric shaver for shaving  - Use soft bristle tooth brush  - Limit straining and forceful nose blowing  Outcome: Progressing     Problem: PAIN - ADULT  Goal: Verbalizes/displays adequate comfort level or patient's stated pain goal  Description: INTERVENTIONS:  - Encourage pt to monitor pain and request assistance  - Assess pain using appropriate pain scale  - Administer analgesics based on type and severity of pain and evaluate response  - Implement non-pharmacological measures as appropriate and evaluate response  - Consider cultural and social influences on pain and pain management  - Manage/alleviate anxiety  - Utilize distraction and/or relaxation techniques  - Monitor for opioid side effects  - Notify MD/LIP if interventions unsuccessful or patient reports new pain  - Anticipate increased pain with activity and pre-medicate as appropriate  Outcome: Progressing     Problem: RISK FOR INFECTION - ADULT  Goal: Absence of fever/infection during anticipated neutropenic period  Description: INTERVENTIONS  - Monitor WBC  - Administer growth factors as ordered  - Implement neutropenic guidelines  Outcome: Progressing     Problem: SAFETY ADULT - FALL  Goal: Free from fall injury  Description: INTERVENTIONS:  - Assess pt frequently for physical needs  - Identify cognitive and physical deficits and behaviors that affect risk of falls.  - [place] fall precautions as indicated by assessment.  - Educate  pt/family on patient safety including physical limitations  - Instruct pt to call for assistance with activity based on assessment  - Modify environment to reduce risk of injury  - Provide assistive devices as appropriate  - Consider OT/PT consult to assist with strengthening/mobility  - Encourage toileting schedule  Outcome: Progressing     Problem: DISCHARGE PLANNING  Goal: Discharge to home or other facility with appropriate resources  Description: INTERVENTIONS:  - Identify barriers to discharge w/pt and caregiver  - Include patient/family/discharge partner in discharge planning  - Arrange for needed discharge resources and transportation as appropriate  - Identify discharge learning needs (meds, wound care, etc)  - Arrange for interpreters to assist at discharge as needed  - Consider post-discharge preferences of patient/family/discharge partner  - Complete POLST form as appropriate  - Assess patient's ability to be responsible for managing their own health  - Refer to Case Management Department for coordinating discharge planning if the patient needs post-hospital services based on physician/LIP order or complex needs related to functional status, cognitive ability or social support system  Outcome: Progressing    NPO at midnight for procedure.  Patient and family aware of plan of care. Heparin drip ongoing. Safety measures in place. Call light within reach.

## 2024-04-23 NOTE — PROGRESS NOTES
[facility]  part of [facility] Hospitalist Progress Note     Nicky Nam Patient Status:  Inpatient    2005 MRN [id number]   Location [facility] 5SW/SE Attending Jose Redd MD   Hosp Day # 2 PCP Tu Yeung MD     Chief Complaint:   Chief Complaint   Patient presents with    Deep Vein Thrombosis        Subjective:     Patient seen lying in bed.  Mother and family at bedside.  Patient reports some increased pain and swelling of left lower extremity overnight, but improving today.  Continues to have lower extremity swelling.   Patient denied current chest pain, shortness of breath, nausea or vomiting.    Objective:      Vital signs:  Vitals:    24 1900 24 2105 24 0529 24 0840   BP:  108/63 100/54 112/69   BP Location:  Right arm Right arm Right arm   Pulse: 97 96     Resp:  18 16 16   Temp:  98.7 °F (37.1 °C) 98.4 °F (36.9 °C) 98.7 °F (37.1 °C)   TempSrc:  Oral Oral Oral   SpO2:  100% 100% 96%   Weight:       Height:           Intake/Output Summary (Last 24 hours) at 2024 0945  Last data filed at 2024 0837  Gross per 24 hour   Intake 2853.3 ml   Output --   Net 2853.3 ml           Physical Exam:    GENERAL:  Awake and alert, in no acute distress.  HEART:  Regular rhythm.  Regular rate  LUNGS:  Air entry was minimally decreased.  No crackles or wheezes   ABDOMEN: Soft and non-tender.    EXT: + edema of LLE, warm  PSYCHIATRIC: Anxious mood    Diagnostic Data:    Labs:    Recent Labs   Lab 24  1056 24  1334 24  1833 24  0510 24  0614   WBC 7.1  --  5.4 3.5*  --    HGB 9.5*  --  9.0* 9.0*  --    MCV 77.5*  --  76.1* 78.4*  --    .0  --  309.0 297.0  297.0 260.0   INR  --  1.09  --   --   --        Recent Labs   Lab 24  1056 24  1833 24  0510   * 103* 151*   BUN 13 14 10   CREATSERUM 0.65 0.54* 0.57   CA 8.9 9.0 8.8   ALB  --   --  4.2    137 138   K 4.4 3.9  4.6    107 109   CO2 24.0 23.0 22.0   ALKPHO  --   --  75   AST  --   --  16   ALT  --   --  16   BILT  --   --  0.2*   TP  --   --  6.8           Estimated Creatinine Clearance: 107.3 mL/min (based on SCr of 0.57 mg/dL).    Recent Labs   Lab 04/21/24  1334   PTP 14.8   INR 1.09            COVID-19  No results found for: \"COVID19\"    Pro-Calcitonin  No results for input(s): \"PCT\" in the last 168 hours.    Cardiac  No results for input(s): \"TROP\", \"PBNP\" in the last 168 hours.    Inflammatory Markers  No results for input(s): \"CRP\", \"FRIDA\", \"LDH\", \"DDIMER\" in the last 168 hours.    Culture:  No results found for this visit on 04/21/24.    CT CHEST PE AORTA (IV ONLY) (CPT=71260)    Result Date: 4/22/2024  CONCLUSION: No PE to the segmental level    Dictated by (CST): Leroy Kidd MD on 4/22/2024 at 7:01 AM     Finalized by (CST): Leroy Kidd MD on 4/22/2024 at 7:35 AM          US VENOUS DOPPLER LEG LEFT - DIAG IMG (CPT=93971)    Result Date: 4/21/2024  CONCLUSION:  1. Extensive DVT throughout the left lower extremity extending from the left external iliac vein to the left upper calf veins.    Dictated by (CST): Sagar Smith MD on 4/21/2024 at 11:59 AM     Finalized by (CST): Sagar Smith MD on 4/21/2024 at 12:00 PM          XR HIP W OR WO PELVIS 2 OR 3 VIEWS, LEFT (CPT=73502)    Result Date: 4/21/2024  CONCLUSION:   No acute fracture, dislocation, or significant osteoarthritis.    Dictated by (CST): Kenya Angel MD on 4/21/2024 at 11:17 AM     Finalized by (CST): Kenya Angel MD on 4/21/2024 at 11:18 AM                 Medications:    predniSONE  50 mg Oral q6h    carBAMazepine ER  300 mg Oral Q12H    famotidine  20 mg Oral BID    fexofenadine  180 mg Oral BID    cetirizine  10 mg Oral Daily       Assessment & Plan:        Acute left lower extremity DVT  -Patient presenting with left lower extremity pain  -Patient initially with mottling and discoloration of left lower extremity  -US venous  Doppler of left leg noting extensive DVT from left external iliac vein to left upper calf veins.  -Continuing on heparin GTT  -IR consulted, recommend proceeding with venogram and thrombolysis.  Plan for 4/23.  Appreciate further recommendations  -CT chest reviewed.  Noted no signs of acute PE.  -Patient with reported history of IV contrast allergy.  Previous completed premedications prior to CT chest.  Plan for additional dosing prior to venogram and thrombolysis as above.    -Pain control as needed  -Continue to monitor     History of seizure disorder  -Continue home antiepileptics     Other medical problems include   Koolen-Agustina syndrome   Asthma   GERD  tethered cord syndrome   neurogenic bladder  Kyphoscoliosis   bilateral hip dysplasia status post bilateral varus derotation osteotomys  diaphragmatic hernia  mast cell activation syndrome     Plan of care discussed with patient and family at bedside and with Rn. Discussed management/test result(s) with IR consultant    Quality:  DVT Prophylaxis: Heparin  CODE status: Full  Estimated date of discharge: TBD  Discharge is dependent on: clinical stability    52 minutes spent discussing with other providers, examining patient, obtaining history, reviewing medical records, interpreting and communicating test results/imaging, ordering tests/medications, discussing plan of care and documenting information.      Jose Redd MD      This note was prepared using Dragon Medical voice recognition dictation software. As a result errors may occur. When identified these errors have been corrected. While every attempt is made to correct errors during dictation discrepancies may still exist

## 2024-04-23 NOTE — IVS NOTE
Inpatient Throughput Communication:    Called inpatient RN Yessy    Verified that appropriate consent is signed: Yes  Access Site Hair Clipped and skin prepped: Yes  Patient has functional IV site: Yes  Patient received all pre-treatment medications: Yes

## 2024-04-23 NOTE — ANESTHESIA POSTPROCEDURE EVALUATION
Patient: Nicky Nam    Procedure Summary       Date: 04/23/24 Room / Location: [facility] Interventional [facility]    Anesthesia Start: 1126 Anesthesia Stop: 1324    Procedure: IR VENOGRAM - LOWER EXTREMITY Diagnosis: (left leg DVT)    Scheduled Providers: Tru Blanton MD; Mor Matias DO Anesthesiologist: Mor Matias DO    Anesthesia Type: general ASA Status: 3            Anesthesia Type: general    Vitals Value Taken Time   /60 04/23/24 1328   Temp 97.4 °F (36.3 °C) 04/23/24 1324   Pulse 101 04/23/24 1329   Resp 17 04/23/24 1329   SpO2 100 % 04/23/24 1329   Vitals shown include unfiled device data.    [facility] AN Post Evaluation:   Patient Evaluated in PACU  Patient Participation: complete - patient participated  Level of Consciousness: awake  Pain Management: adequate  Airway Patency:patent  Dental exam unchanged from preop  Yes    Nausea/Vomiting: none  Cardiovascular Status: acceptable  Respiratory Status: acceptable  Postoperative Hydration acceptable      MOR MATIAS DO  4/23/2024 1:29 PM

## 2024-04-23 NOTE — PROCEDURES
Interventional Radiology  Brief Post-Procedure Note    Procedure(s): bilateral lower extremity and IVC venography    Indication: left lower extremity DVT    (s): Randy    Anesthesia: General    Findings:    -initially prone positioning  -left small saphenous vein access  -venography demonstrating thrombosis of left popliteal vein through left common iliac vein without commuication with IVC  -right popliteal vein access  -venography demonstrating wide patency of right popliteal vein through right common iliac vein with outflow via dilated lumbar collateral  -patient repositioned supine  -right internal jugular vein access  -venography from hepatic IVC demonstrating discontinuous IVC below hepatic veins  -venography from right leg demonstrated outflow via Azygos vein  -outside imaging reports reviewed; patient has absent infrarenal IVC  -left leg evaluated and had no edema  -accesses removed; no thrombectomy performed    Blood loss: <5 mL      Complications:  unable to perform thrombectomy due to anatomy    Plan:   -continue anticoagulation  -follow up with me in clinic in 2 weeks    Please refer to full dictation under the \"Imaging\" tab in Epic.    Dandre Padilla MD  4/23/2024  Interventional Radiology  [facility]

## 2024-04-24 LAB
ANION GAP SERPL CALC-SCNC: 8 MMOL/L (ref 0–18)
APTT PPP: 74.1 SECONDS (ref 23.3–35.6)
BASOPHILS # BLD AUTO: 0.01 X10(3) UL (ref 0–0.2)
BASOPHILS NFR BLD AUTO: 0.2 %
BUN BLD-MCNC: 11 MG/DL (ref 9–23)
BUN/CREAT SERPL: 21.2 (ref 10–20)
CALCIUM BLD-MCNC: 8.3 MG/DL (ref 8.7–10.4)
CHLORIDE SERPL-SCNC: 109 MMOL/L (ref 98–112)
CO2 SERPL-SCNC: 24 MMOL/L (ref 21–32)
CREAT BLD-MCNC: 0.52 MG/DL
DEPRECATED RDW RBC AUTO: 42.6 FL (ref 35.1–46.3)
EGFRCR SERPLBLD CKD-EPI 2021: 137 ML/MIN/1.73M2 (ref 60–?)
EOSINOPHIL # BLD AUTO: 0.02 X10(3) UL (ref 0–0.7)
EOSINOPHIL NFR BLD AUTO: 0.4 %
ERYTHROCYTE [DISTWIDTH] IN BLOOD BY AUTOMATED COUNT: 15 % (ref 11–15)
GLUCOSE BLD-MCNC: 93 MG/DL (ref 70–99)
HCT VFR BLD AUTO: 26.2 %
HGB BLD-MCNC: 8 G/DL
IMM GRANULOCYTES # BLD AUTO: 0.02 X10(3) UL (ref 0–1)
IMM GRANULOCYTES NFR BLD: 0.4 %
LYMPHOCYTES # BLD AUTO: 1.48 X10(3) UL (ref 1.5–5)
LYMPHOCYTES NFR BLD AUTO: 33.3 %
MCH RBC QN AUTO: 24 PG (ref 26–34)
MCHC RBC AUTO-ENTMCNC: 30.5 G/DL (ref 31–37)
MCV RBC AUTO: 78.4 FL
MONOCYTES # BLD AUTO: 0.85 X10(3) UL (ref 0.1–1)
MONOCYTES NFR BLD AUTO: 19.1 %
NEUTROPHILS # BLD AUTO: 2.07 X10 (3) UL (ref 1.5–7.7)
NEUTROPHILS # BLD AUTO: 2.07 X10(3) UL (ref 1.5–7.7)
NEUTROPHILS NFR BLD AUTO: 46.6 %
OSMOLALITY SERPL CALC.SUM OF ELEC: 291 MOSM/KG (ref 275–295)
PLATELET # BLD AUTO: 268 10(3)UL (ref 150–450)
POTASSIUM SERPL-SCNC: 3.8 MMOL/L (ref 3.5–5.1)
RBC # BLD AUTO: 3.34 X10(6)UL
SODIUM SERPL-SCNC: 141 MMOL/L (ref 136–145)
WBC # BLD AUTO: 4.5 X10(3) UL (ref 4–11)

## 2024-04-24 PROCEDURE — 99233 SBSQ HOSP IP/OBS HIGH 50: CPT | Performed by: HOSPITALIST

## 2024-04-24 RX ORDER — SENNOSIDES 8.6 MG
8.6 TABLET ORAL DAILY
Status: DISCONTINUED | OUTPATIENT
Start: 2024-04-24 | End: 2024-04-30

## 2024-04-24 RX ORDER — DOCUSATE SODIUM 100 MG/1
100 CAPSULE, LIQUID FILLED ORAL DAILY
Status: DISCONTINUED | OUTPATIENT
Start: 2024-04-24 | End: 2024-04-24

## 2024-04-24 NOTE — PLAN OF CARE
Patient vital signs stable overnight. One episode of tachycardia related to anxiety, spoke to patient and heart rate lowered after patient calmed down. IV fluids running, heparin drip running. Mild pain noted to left leg, otherwise no c/o pain. Mother at bedside. No acute changes.    Problem: Patient Centered Care  Goal: Patient preferences are identified and integrated in the patient's plan of care  Description: Interventions:  - What would you like us to know as we care for you? Home with family   - Provide timely, complete, and accurate information to patient/family  - Incorporate patient and family knowledge, values, beliefs, and cultural backgrounds into the planning and delivery of care  - Encourage patient/family to participate in care and decision-making at the level they choose  - Honor patient and family perspectives and choices  Outcome: Progressing     Problem: Patient/Family Goals  Goal: Patient/Family Long Term Goal  Description: Patient's Long Term Goal: resolve DVT     Interventions:  - Monitor vitals  - Monitor appropriate labs  - Administer medications as ordered  - Follow MD's orders  - Update patient on plan of care   - Discharge planning     - See additional Care Plan goals for specific interventions  Outcome: Progressing  Goal: Patient/Family Short Term Goal  Description: Patient's Short Term Goal: Transfer to [facility]     Interventions:   - Social work consult   - Follow doctor's orders   - See additional Care Plan goals for specific interventions  Outcome: Progressing     Problem: SKIN/TISSUE INTEGRITY - ADULT  Goal: Skin integrity remains intact  Description: INTERVENTIONS  - Assess and document risk factors for pressure ulcer development  - Assess and document skin integrity  - Monitor for areas of redness and/or skin breakdown  - Initiate interventions, skin care algorithm/standards of care as needed  Outcome: Progressing     Problem: HEMATOLOGIC - ADULT  Goal: Maintains hematologic  stability  Description: INTERVENTIONS  - Assess for signs and symptoms of bleeding or hemorrhage  - Monitor labs and vital signs for trends  - Administer supportive blood products/factors, fluids and medications as ordered and appropriate  - Administer supportive blood products/factors as ordered and appropriate  Outcome: Progressing  Goal: Free from bleeding injury  Description: (Example usage: patient with low platelets)  INTERVENTIONS:  - Avoid intramuscular injections, enemas and rectal medication administration  - Ensure safe mobilization of patient  - Hold pressure on venipuncture sites to achieve adequate hemostasis  - Assess for signs and symptoms of internal bleeding  - Monitor lab trends  - Patient is to report abnormal signs of bleeding to staff  - Avoid use of toothpicks and dental floss  - Use electric shaver for shaving  - Use soft bristle tooth brush  - Limit straining and forceful nose blowing  Outcome: Progressing     Problem: PAIN - ADULT  Goal: Verbalizes/displays adequate comfort level or patient's stated pain goal  Description: INTERVENTIONS:  - Encourage pt to monitor pain and request assistance  - Assess pain using appropriate pain scale  - Administer analgesics based on type and severity of pain and evaluate response  - Implement non-pharmacological measures as appropriate and evaluate response  - Consider cultural and social influences on pain and pain management  - Manage/alleviate anxiety  - Utilize distraction and/or relaxation techniques  - Monitor for opioid side effects  - Notify MD/LIP if interventions unsuccessful or patient reports new pain  - Anticipate increased pain with activity and pre-medicate as appropriate  Outcome: Progressing     Problem: RISK FOR INFECTION - ADULT  Goal: Absence of fever/infection during anticipated neutropenic period  Description: INTERVENTIONS  - Monitor WBC  - Administer growth factors as ordered  - Implement neutropenic guidelines  Outcome:  Progressing     Problem: SAFETY ADULT - FALL  Goal: Free from fall injury  Description: INTERVENTIONS:  - Assess pt frequently for physical needs  - Identify cognitive and physical deficits and behaviors that affect risk of falls.  - [place] fall precautions as indicated by assessment.  - Educate pt/family on patient safety including physical limitations  - Instruct pt to call for assistance with activity based on assessment  - Modify environment to reduce risk of injury  - Provide assistive devices as appropriate  - Consider OT/PT consult to assist with strengthening/mobility  - Encourage toileting schedule  Outcome: Progressing     Problem: DISCHARGE PLANNING  Goal: Discharge to home or other facility with appropriate resources  Description: INTERVENTIONS:  - Identify barriers to discharge w/pt and caregiver  - Include patient/family/discharge partner in discharge planning  - Arrange for needed discharge resources and transportation as appropriate  - Identify discharge learning needs (meds, wound care, etc)  - Arrange for interpreters to assist at discharge as needed  - Consider post-discharge preferences of patient/family/discharge partner  - Complete POLST form as appropriate  - Assess patient's ability to be responsible for managing their own health  - Refer to Case Management Department for coordinating discharge planning if the patient needs post-hospital services based on physician/LIP order or complex needs related to functional status, cognitive ability or social support system  Outcome: Progressing

## 2024-04-24 NOTE — PROGRESS NOTES
[facility]  part of [facility]  Progress Note    Nicky Nam Patient Status:  Inpatient    2005 MRN [id number]   Location [facility] 5SW/SE Attending Anthony Short MD   Hosp Day # 3 PCP Tu Yeung MD       Subjective:   Left leg \"feels heavy, toes feel weird\".    Objective:   Physical Exam  Cardiovascular:      Rate and Rhythm: Normal rate.      Comments: Leg swelling worse than yesterday.   Pulmonary:      Effort: No respiratory distress.   Musculoskeletal:      Left lower le+ Pitting Edema present.   Neurological:      Mental Status: She is alert.      Family at bedside.     Blood pressure 104/58, pulse 83, temperature 98.2 °F (36.8 °C), temperature source Oral, resp. rate 18, height 59\", weight 94 lb 6.4 oz (42.8 kg), last menstrual period 2024, SpO2 100%.    Results:   Labs:  Lab Results   Component Value Date    WBC 4.5 2024    RBC 3.34 2024    HGB 8.0 2024    HCT 26.2 2024    MCV 78.4 2024    MCH 24.0 2024    MCHC 30.5 2024    RDW 15.0 2024    .0 2024 VENOGRAM CONCLUSION: Bilateral lower extremity venography demonstrating patent right popliteal through common iliac veins, thrombosed left popliteal through common iliac veins, no communication with the inferior vena cava from the iliac veins. 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 Due to the patient's anatomy and the inability to obtain adequate venous outflow, the decision was made not to perform thrombectomy this time.     Assessment and Plan:   Left leg DVT - s/p venogram.  Swelling has progressed in the past 24 hours.  Encouraged Nicky to move her leg, foot and toes, she is reluctant.      PLAN:  CT venogram in a.m. after contrast allergy premedication.               Physical therapy,  advance activity.   C[facility]RT riv[facility].        VALERIE AGUIAR, APRN  4/24/2024

## 2024-04-24 NOTE — PROGRESS NOTES
[facility]  part of [facility]    Progress Note    Nicky Nam Patient Status:  Inpatient    2005 MRN [id number]   Location [facility] 5SW/SE Attending Anthony Short MD   Hosp Day # 3 PCP Tu Yeung MD     Chief Complaint:     Acute DVT     Subjective:   Subjective:    Patient seen and examined this morning  Afebrile, normotensive  Episode of tachycardia  Endorsing leg pain.     Objective:   Blood pressure 104/58, pulse 83, temperature 98.2 °F (36.8 °C), temperature source Oral, resp. rate 18, height 4' 11\" (1.499 m), weight 94 lb 6.4 oz (42.8 kg), last menstrual period 2024, SpO2 100%.  Physical Exam    General: Patient is alert and oriented  not appear to be in acute distress at this time, development delay  HEENT: EOMI PERRLA, atraumatic normocephalic  Cardiac: S1-S2 appreciated  Lungs: Good air entry bilaterally clear to auscultation  Abdomen: Soft nontender nondistended positive bowel sounds  Ext: Peripheral pulses are positive, LLE edema  Neuro: No focal deficits noted      Results:   Lab Results   Component Value Date    WBC 4.5 2024    HGB 8.0 (L) 2024    HCT 26.2 (L) 2024    .0 2024    CREATSERUM 0.52 (L) 2024    BUN 11 2024     2024    K 3.8 2024     2024    CO2 24.0 2024    GLU 93 2024    CA 8.3 (L) 2024    ALB 4.2 2024    ALKPHO 75 2024    BILT 0.2 (L) 2024    TP 6.8 2024    AST 16 2024    ALT 16 2024    PTT 74.1 (H) 2024    INR 1.09 2024    MG 2.0 2024    PHOS 3.0 2024       No results found.        Assessment & Plan:       Acute left lower extremity DVT  -Patient presenting with left lower extremity pain  -Patient initially with mottling and discoloration of left lower extremity  -US venous Doppler of left leg noting extensive DVT from left external iliac vein to left upper calf  veins.  -Continuing on heparin GTT  -IR consulted, recommend proceeding with venogram and thrombolysis.  Plan for 4/23.  Appreciate further recommendations  -CT chest reviewed.  Noted no signs of acute PE.  -Patient with reported history of IV contrast allergy.  Previous completed premedications prior to CT chest.  Plan for additional dosing prior to venogram and thrombolysis as above.    -Pain control as needed  -Continue to monitor     History of seizure disorder  -Continue home antiepileptics     Other medical problems include   Koolen-Agustina syndrome   Asthma   GERD  tethered cord syndrome   neurogenic bladder  Kyphoscoliosis   bilateral hip dysplasia status post bilateral varus derotation osteotomys  diaphragmatic hernia  mast cell activation syndrome      Plan of care discussed with patient and family at bedside and with Rn. Discussed management/test result(s) with IR consultant     Global A/P  -LE looks more swollen.   -unable to do thrombectomy - due to anatomy    -will need to have Lovenox subcutaneous.     - CT Venogram of Lower extremity - pre-medicate.  -Reviewed previous consultant notes  -Reviewed CBC, BMP, Mag, and Phos  -Reviewed tests ordered  -Repeat labs in am  -MDM: High, severe exacerbation of chronic illness posing a threat to life. IV medications requiring close inpatient monitoring.   -PT/OT.       52 minutes spent discussing with other providers, examining patient, obtaining history, reviewing medical records, interpreting and communicating test results/imaging, ordering tests/medications, discussing plan of care and documenting information.          Anthony Short MD

## 2024-04-24 NOTE — PLAN OF CARE
Patient is Axox4, complain of mild pain, gave PRN tylenol. Vitals done, HR was little elevated. On IV heparin drip, therapeutic results. Incontinent care done in bed. Dressing to both posterior knee is dry, intact.     Problem: Patient Centered Care  Goal: Patient preferences are identified and integrated in the patient's plan of care  Description: Interventions:  - What would you like us to know as we care for you? Home with family   - Provide timely, complete, and accurate information to patient/family  - Incorporate patient and family knowledge, values, beliefs, and cultural backgrounds into the planning and delivery of care  - Encourage patient/family to participate in care and decision-making at the level they choose  - Honor patient and family perspectives and choices  Outcome: Progressing     Problem: Patient/Family Goals  Goal: Patient/Family Long Term Goal  Description: Patient's Long Term Goal: resolve DVT     Interventions:  - Monitor vitals  - Monitor appropriate labs  - Administer medications as ordered  - Follow MD's orders  - Update patient on plan of care   - Discharge planning     - See additional Care Plan goals for specific interventions  Outcome: Progressing  Goal: Patient/Family Short Term Goal  Description: Patient's Short Term Goal: Transfer to [facility]     Interventions:   - Social work consult   - Follow doctor's orders   - See additional Care Plan goals for specific interventions  Outcome: Progressing     Problem: SKIN/TISSUE INTEGRITY - ADULT  Goal: Skin integrity remains intact  Description: INTERVENTIONS  - Assess and document risk factors for pressure ulcer development  - Assess and document skin integrity  - Monitor for areas of redness and/or skin breakdown  - Initiate interventions, skin care algorithm/standards of care as needed  Outcome: Progressing     Problem: HEMATOLOGIC - ADULT  Goal: Maintains hematologic stability  Description: INTERVENTIONS  - Assess for signs and symptoms  of bleeding or hemorrhage  - Monitor labs and vital signs for trends  - Administer supportive blood products/factors, fluids and medications as ordered and appropriate  - Administer supportive blood products/factors as ordered and appropriate  Outcome: Progressing  Goal: Free from bleeding injury  Description: (Example usage: patient with low platelets)  INTERVENTIONS:  - Avoid intramuscular injections, enemas and rectal medication administration  - Ensure safe mobilization of patient  - Hold pressure on venipuncture sites to achieve adequate hemostasis  - Assess for signs and symptoms of internal bleeding  - Monitor lab trends  - Patient is to report abnormal signs of bleeding to staff  - Avoid use of toothpicks and dental floss  - Use electric shaver for shaving  - Use soft bristle tooth brush  - Limit straining and forceful nose blowing  Outcome: Progressing     Problem: PAIN - ADULT  Goal: Verbalizes/displays adequate comfort level or patient's stated pain goal  Description: INTERVENTIONS:  - Encourage pt to monitor pain and request assistance  - Assess pain using appropriate pain scale  - Administer analgesics based on type and severity of pain and evaluate response  - Implement non-pharmacological measures as appropriate and evaluate response  - Consider cultural and social influences on pain and pain management  - Manage/alleviate anxiety  - Utilize distraction and/or relaxation techniques  - Monitor for opioid side effects  - Notify MD/LIP if interventions unsuccessful or patient reports new pain  - Anticipate increased pain with activity and pre-medicate as appropriate  Outcome: Progressing     Problem: RISK FOR INFECTION - ADULT  Goal: Absence of fever/infection during anticipated neutropenic period  Description: INTERVENTIONS  - Monitor WBC  - Administer growth factors as ordered  - Implement neutropenic guidelines  Outcome: Progressing     Problem: SAFETY ADULT - FALL  Goal: Free from fall  injury  Description: INTERVENTIONS:  - Assess pt frequently for physical needs  - Identify cognitive and physical deficits and behaviors that affect risk of falls.  - [place] fall precautions as indicated by assessment.  - Educate pt/family on patient safety including physical limitations  - Instruct pt to call for assistance with activity based on assessment  - Modify environment to reduce risk of injury  - Provide assistive devices as appropriate  - Consider OT/PT consult to assist with strengthening/mobility  - Encourage toileting schedule  Outcome: Progressing     Problem: DISCHARGE PLANNING  Goal: Discharge to home or other facility with appropriate resources  Description: INTERVENTIONS:  - Identify barriers to discharge w/pt and caregiver  - Include patient/family/discharge partner in discharge planning  - Arrange for needed discharge resources and transportation as appropriate  - Identify discharge learning needs (meds, wound care, etc)  - Arrange for interpreters to assist at discharge as needed  - Consider post-discharge preferences of patient/family/discharge partner  - Complete POLST form as appropriate  - Assess patient's ability to be responsible for managing their own health  - Refer to Case Management Department for coordinating discharge planning if the patient needs post-hospital services based on physician/LIP order or complex needs related to functional status, cognitive ability or social support system  Outcome: Progressing

## 2024-04-24 NOTE — PAYOR COMM NOTE
--------------  :  CONTINUED STAY REVIEW    Payor: [employer]  Subscriber #:  [id number]  Authorization Number: [id number]    Admit date: 24  Admit time:  3:34 PM    :  Interventional Radiology  Brief Post-Procedure Note     Procedure(s): bilateral lower extremity and IVC venography     Indication: left lower extremity DVT     (s): Randy     Anesthesia: General     Findings:    -initially prone positioning  -left small saphenous vein access  -venography demonstrating thrombosis of left popliteal vein through left common iliac vein without commuication with IVC  -right popliteal vein access  -venography demonstrating wide patency of right popliteal vein through right common iliac vein with outflow via dilated lumbar collateral  -patient repositioned supine  -right internal jugular vein access  -venography from hepatic IVC demonstrating discontinuous IVC below hepatic veins  -venography from right leg demonstrated outflow via Azygos vein  -outside imaging reports reviewed; patient has absent infrarenal IVC  -left leg evaluated and had no edema  -accesses removed; no thrombectomy performed     Blood loss: <5 mL                            Complications:  unable to perform thrombectomy due to anatomy     Plan:   -continue anticoagulation  -follow up with me in clinic in 2 weeks     Please refer to full dictation under the \"Imaging\" tab in Epic.     Dandre Padilla MD  2024  Interventional Radiology  [facility]             IR:     Subjective:   Left leg \"feels heavy, toes feel weird\".     Objective:   Physical Exam  Cardiovascular:      Rate and Rhythm: Normal rate.      Comments: Leg swelling worse than yesterday.   Pulmonary:      Effort: No respiratory distress.   Musculoskeletal:      Left lower le+ Pitting Edema present.   Neurological:      Mental Status: She is alert.       Family at bedside.      Blood pressure 104/58, pulse 83, temperature 98.2 °F (36.8 °C),  temperature source Oral, resp. rate 18, height 59\", weight 94 lb 6.4 oz (42.8 kg), last menstrual period 04/14/2024, SpO2 100%.     Results:   Labs:        Lab Results   Component Value Date     WBC 4.5 04/24/2024     RBC 3.34 04/24/2024     HGB 8.0 04/24/2024     HCT 26.2 04/24/2024     MCV 78.4 04/24/2024     MCH 24.0 04/24/2024     MCHC 30.5 04/24/2024     RDW 15.0 04/24/2024     .0 04/24/2024 4/23 VENOGRAM CONCLUSION: Bilateral lower extremity venography demonstrating patent right popliteal through common iliac veins, thrombosed left popliteal through common iliac veins, no communication with the inferior vena cava from the iliac veins. 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 Due to the patient's anatomy and the inability to obtain adequate venous outflow, the decision was made not to perform thrombectomy this time.      Assessment and Plan:   Left leg DVT - s/p venogram.  Swelling has progressed in the past 24 hours.  Encouraged Nicky to move her leg, foot and toes, she is reluctant.       PLAN:  CT venogram in a.m. after contrast allergy premedication.               Physical therapy, advance activity.   [facility].          VALERIE AGUIAR, APRN  4/24/2024                MEDICATIONS ADMINISTERED IN LAST 1 DAY:  acetaminophen (Tylenol Extra Strength) tab 500 mg       Date Action Dose Route User    4/24/2024 0938 Given 500 mg Oral Radha Littlejohn RN    4/23/2024 1618 Given 500 mg Oral Reyna Smith RN          carBAMazepine ER (TEGRETOL XR) 12 hr tab 300 mg       Date Action Dose Route User    4/24/2024 0819 Given 300 mg Oral Radha Littlejohn RN    4/23/2024 2044 Given 300 mg Oral Shilpa Edouard RN          heparin (Porcine) 53784 units/250mL infusion PE/DVT/THROMBUS CONTINUOUS       Date Action Dose Route User    4/24/2024 0220 New Bag 900 Units/hr Intravenous  Shilpa Edouard RN    4/23/2024 1448 Hi-Risk Other 900 Units/hr Intravenous Yessy Delaney RN          famotidine (Pepcid) tab 20 mg       Date Action Dose Route User    4/24/2024 0819 Given 20 mg Oral Radha Littlejohn RN    4/23/2024 2044 Given 20 mg Oral Shilpa Edouard RN          Allegra Allergy (Fexofenadine) 180mg 24 hr tablet - pt's own medication       Date Action Dose Route User    4/24/2024 0820 Given 180 mg Oral Radha Littlejohn RN    4/23/2024 2044 Given 180 mg Oral Shilpa Edouard RN          sennosides (Senokot) tab 8.6 mg       Date Action Dose Route User    4/24/2024 1106 Given 8.6 mg Oral Radha Littlejohn RN          sodium chloride 0.9% infusion       Date Action Dose Route User    4/24/2024 0439 New Bag (none) Intravenous Shilpa Edouard RN            Vitals (last day)       Date/Time Temp Pulse Resp BP SpO2 Weight O2 Device O2 Flow Rate (L/min) [facility]    04/24/24 0800 98.2 °F (36.8 °C) -- 18 104/58 100 % -- None (Room air) -- AP    04/24/24 0438 98.6 °F (37 °C) 83 18 107/61 99 % -- None (Room air) -- IB    04/24/24 0400 -- 76 -- -- -- -- -- -- SW    04/23/24 2034 98.8 °F (37.1 °C) 89 18 124/60 100 % -- None (Room air) -- IB    04/23/24 1900 -- 99 -- -- -- -- -- -- SW    04/23/24 1450 -- 83 18 121/84 95 % -- None (Room air) -- AO    04/23/24 1403 97.6 °F (36.4 °C) 90 18 130/98 95 % -- None (Room air) -- MP    04/23/24 1350 97.4 °F (36.3 °C) 90 17 -- 100 % -- None (Room air) -- CM    04/23/24 1340 -- 81 22 112/82 100 % -- -- -- CM    04/23/24 1330 -- 56 18 113/60 100 % -- None (Room air) -- CM    04/23/24 1324 -- -- -- -- -- -- None (Room air) -- CM    04/23/24 1324 97.4 °F (36.3 °C) 113 16 113/60 100 % -- -- -- RL    04/23/24 1321 -- -- -- -- 100 % -- -- -- CM    04/23/24 0840 98.7 °F (37.1 °C) 105 16 112/69 96 % -- None (Room air) -- AO    04/23/24 0529 98.4 °F (36.9 °C) -- 16 100/54 100 % -- None (Room air) -- CG

## 2024-04-25 ENCOUNTER — APPOINTMENT (OUTPATIENT)
Dept: CT IMAGING | Facility: HOSPITAL | Age: 19
End: 2024-04-25
Attending: CLINICAL NURSE SPECIALIST
Payer: COMMERCIAL

## 2024-04-25 ENCOUNTER — APPOINTMENT (OUTPATIENT)
Dept: GENERAL RADIOLOGY | Facility: HOSPITAL | Age: 19
End: 2024-04-25
Attending: HOSPITALIST
Payer: COMMERCIAL

## 2024-04-25 LAB
ANION GAP SERPL CALC-SCNC: 7 MMOL/L (ref 0–18)
APTT PPP: 75.9 SECONDS (ref 23.3–35.6)
BASOPHILS # BLD AUTO: 0.02 X10(3) UL (ref 0–0.2)
BASOPHILS NFR BLD AUTO: 0.4 %
BUN BLD-MCNC: 8 MG/DL (ref 9–23)
BUN/CREAT SERPL: 14.8 (ref 10–20)
CALCIUM BLD-MCNC: 9 MG/DL (ref 8.7–10.4)
CHLORIDE SERPL-SCNC: 107 MMOL/L (ref 98–112)
CO2 SERPL-SCNC: 24 MMOL/L (ref 21–32)
CREAT BLD-MCNC: 0.54 MG/DL
DEPRECATED RDW RBC AUTO: 41.5 FL (ref 35.1–46.3)
EGFRCR SERPLBLD CKD-EPI 2021: 136 ML/MIN/1.73M2 (ref 60–?)
EOSINOPHIL # BLD AUTO: 0 X10(3) UL (ref 0–0.7)
EOSINOPHIL NFR BLD AUTO: 0 %
ERYTHROCYTE [DISTWIDTH] IN BLOOD BY AUTOMATED COUNT: 14.7 % (ref 11–15)
GLUCOSE BLD-MCNC: 144 MG/DL (ref 70–99)
HCT VFR BLD AUTO: 29.8 %
HGB BLD-MCNC: 9.5 G/DL
IMM GRANULOCYTES # BLD AUTO: 0.03 X10(3) UL (ref 0–1)
IMM GRANULOCYTES NFR BLD: 0.6 %
LYMPHOCYTES # BLD AUTO: 0.4 X10(3) UL (ref 1.5–5)
LYMPHOCYTES NFR BLD AUTO: 7.7 %
MCH RBC QN AUTO: 24.8 PG (ref 26–34)
MCHC RBC AUTO-ENTMCNC: 31.9 G/DL (ref 31–37)
MCV RBC AUTO: 77.8 FL
MONOCYTES # BLD AUTO: 0.17 X10(3) UL (ref 0.1–1)
MONOCYTES NFR BLD AUTO: 3.3 %
NEUTROPHILS # BLD AUTO: 4.59 X10 (3) UL (ref 1.5–7.7)
NEUTROPHILS # BLD AUTO: 4.59 X10(3) UL (ref 1.5–7.7)
NEUTROPHILS NFR BLD AUTO: 88 %
OSMOLALITY SERPL CALC.SUM OF ELEC: 287 MOSM/KG (ref 275–295)
PLATELET # BLD AUTO: 314 10(3)UL (ref 150–450)
POTASSIUM SERPL-SCNC: 4.4 MMOL/L (ref 3.5–5.1)
RBC # BLD AUTO: 3.83 X10(6)UL
SODIUM SERPL-SCNC: 138 MMOL/L (ref 136–145)
WBC # BLD AUTO: 5.2 X10(3) UL (ref 4–11)

## 2024-04-25 PROCEDURE — 74177 CT ABD & PELVIS W/CONTRAST: CPT | Performed by: CLINICAL NURSE SPECIALIST

## 2024-04-25 PROCEDURE — 73560 X-RAY EXAM OF KNEE 1 OR 2: CPT | Performed by: HOSPITALIST

## 2024-04-25 RX ORDER — ENOXAPARIN SODIUM 100 MG/ML
1 INJECTION SUBCUTANEOUS EVERY 12 HOURS SCHEDULED
Status: DISCONTINUED | OUTPATIENT
Start: 2024-04-25 | End: 2024-04-30

## 2024-04-25 NOTE — PAYOR COMM NOTE
--------------  4/25: CONTINUED STAY REVIEW    Payor: [employer]ART[employer]  Subscriber #:  [id number]  Authorization Number: [id number]    Admit date: 4/21/24  Admit time:  3:34 PM    HOSPITALIST:    Chief Complaint:      Acute DVT         Subjective:  Subjective:     Patient seen and examined this morning  Afebrile, normotensive  Episode of tachycardia  Better spirits today  Mother at bedside           Objective:  Blood pressure 125/79, pulse 107, temperature 98.3 °F (36.8 °C), temperature source Oral, resp. rate 18, height 4' 11\" (1.499 m), weight 94 lb 6.4 oz (42.8 kg), last menstrual period 04/14/2024, SpO2 100%.  Physical Exam     General: Patient is alert and oriented  not appear to be in acute distress at this time, development delay  HEENT: EOMI PERRLA, atraumatic normocephalic  Cardiac: S1-S2 appreciated  Lungs: Good air entry bilaterally clear to auscultation  Abdomen: Soft nontender nondistended positive bowel sounds  Ext: Peripheral pulses are positive, LLE edema  Neuro: No focal deficits noted           Results:        Lab Results   Component Value Date     WBC 5.2 04/25/2024     HGB 9.5 (L) 04/25/2024     HCT 29.8 (L) 04/25/2024     .0 04/25/2024     CREATSERUM 0.54 (L) 04/25/2024     BUN 8 (L) 04/25/2024      04/25/2024     K 4.4 04/25/2024      04/25/2024     CO2 24.0 04/25/2024      (H) 04/25/2024     CA 9.0 04/25/2024          Assessment & Plan:  Acute left lower extremity DVT  -Patient presenting with left lower extremity pain  -Patient initially with mottling and discoloration of left lower extremity  -US venous Doppler of left leg noting extensive DVT from left external iliac vein to left upper calf veins.  -Continuing on heparin GTT  -IR consulted, recommend proceeding with venogram and thrombolysis.  Plan for 4/23.  Appreciate further recommendations  -CT chest reviewed.  Noted no signs of acute PE.  -Patient with reported history of IV contrast allergy.  Previous completed  premedications prior to CT chest.  Plan for additional dosing prior to venogram and thrombolysis as above.    -Pain control as needed  -Continue to monitor     History of seizure disorder  -Continue home antiepileptics     Other medical problems include   Koolen-Agustina syndrome   Asthma   GERD  tethered cord syndrome   neurogenic bladder  Kyphoscoliosis   bilateral hip dysplasia status post bilateral varus derotation osteotomys  diaphragmatic hernia  mast cell activation syndrome      Plan of care discussed with patient and family at bedside and with Rn. Discussed management/test result(s) with IR consultant     Global A/P  -LE looks more swollen.   -unable to do thrombectomy - due to anatomy     -will need to have Lovenox subcutaneous.                   - CT Venogram of Lower extremity - pre-medicate.  -Reviewed previous consultant notes  -Reviewed CBC, BMP, Mag, and Phos  -Reviewed tests ordered  -Repeat labs in am  -MDM: High, severe exacerbation of chronic illness posing a threat to life. IV medications requiring close inpatient monitoring.   -PT/OT.        52 minutes spent discussing with other providers, examining patient, obtaining history, reviewing medical records, interpreting and communicating test results/imaging, ordering tests/medications, discussing plan of care and documenting information.        Anthony Short MD              MEDICATIONS ADMINISTERED IN LAST 1 DAY:  acetaminophen (Tylenol Extra Strength) tab 500 mg       Date Action Dose Route User    4/24/2024 1847 Given 500 mg Oral Radha Littlejohn RN          carBAMazepine ER (TEGRETOL XR) 12 hr tab 300 mg       Date Action Dose Route User    4/25/2024 0849 Given 300 mg Oral Radha Littlejohn RN    4/24/2024 2024 Given 300 mg Oral Shilpa Edouard RN          cetirizine (ZyrTEC) tab 10 mg       Date Action Dose Route User    4/25/2024 0849 Given 10 mg Oral Radah Littlejohn RN          heparin (Porcine) 08014 units/250mL infusion  PE/DVT/THROMBUS CONTINUOUS       Date Action Dose Route User    4/25/2024 0031 New Bag 900 Units/hr Intravenous Shilpa Edouard RN          famotidine (Pepcid) tab 20 mg       Date Action Dose Route User    4/25/2024 0849 Given 20 mg Oral Radha Littlejohn RN    4/24/2024 2024 Given 20 mg Oral Shilpa Edouard RN          Allegra Allergy (Fexofenadine) 180mg 24 hr tablet - pt's own medication       Date Action Dose Route User    4/25/2024 0849 Given 180 mg Oral Radha Littlejohn RN    4/24/2024 2024 Given 180 mg Oral Shilpa Edouard RN          iopamidol 76% (ISOVUE-370) injection for power injector       Date Action Dose Route User    4/25/2024 1035 Given 80 mL Intravenous Addis Smith          sennosides (Senokot) tab 8.6 mg       Date Action Dose Route User    4/25/2024 0849 Given 8.6 mg Oral Radha Littlejohn RN          predniSONE (Deltasone) tab 50 mg       Date Action Dose Route User    4/25/2024 0600 Given 50 mg Oral Shilpa Edouard RN    4/25/2024 0031 Given 50 mg Oral Shilpa Edouard RN    4/24/2024 1847 Given 50 mg Oral Radha Littlejohn RN            Vitals (last day)       Date/Time Temp Pulse Resp BP SpO2 Weight O2 Device O2 Flow Rate (L/min) [facility]    04/25/24 0800 98.3 °F (36.8 °C) 107 18 125/79 100 % -- None (Room air) -- AP    04/25/24 0549 97.5 °F (36.4 °C) 82 18 112/67 99 % -- None (Room air) -- IB    04/25/24 0400 -- 74 -- -- -- -- -- --     04/24/24 2020 98 °F (36.7 °C) 95 18 119/77 100 % -- None (Room air) -- IB    04/24/24 1900 -- 97 -- -- -- -- -- --     04/24/24 1700 97.9 °F (36.6 °C) 94 18 114/76 100 % -- None (Room air) -- AP    04/24/24 0800 98.2 °F (36.8 °C) -- 18 104/58 100 % -- None (Room air) -- AP    04/24/24 0438 98.6 °F (37 °C) 83 18 107/61 99 % -- None (Room air) -- IB    04/24/24 0400 -- 76 -- -- -- -- -- -- SW

## 2024-04-25 NOTE — PROGRESS NOTES
[facility]  part of [facility]    Progress Note    Nicky Nam Patient Status:  Inpatient    2005 MRN [id number]   Location [facility] 5SW/SE Attending Anthony Short MD   Hosp Day # 4 PCP Tu Yeung MD     Chief Complaint:     Acute DVT     Subjective:   Subjective:    Patient seen and examined this morning  Afebrile, normotensive  Episode of tachycardia  Better spirits today  Mother at bedside    Objective:   Blood pressure 125/79, pulse 107, temperature 98.3 °F (36.8 °C), temperature source Oral, resp. rate 18, height 4' 11\" (1.499 m), weight 94 lb 6.4 oz (42.8 kg), last menstrual period 2024, SpO2 100%.  Physical Exam    General: Patient is alert and oriented  not appear to be in acute distress at this time, development delay  HEENT: EOMI PERRLA, atraumatic normocephalic  Cardiac: S1-S2 appreciated  Lungs: Good air entry bilaterally clear to auscultation  Abdomen: Soft nontender nondistended positive bowel sounds  Ext: Peripheral pulses are positive, LLE edema  Neuro: No focal deficits noted      Results:   Lab Results   Component Value Date    WBC 5.2 2024    HGB 9.5 (L) 2024    HCT 29.8 (L) 2024    .0 2024    CREATSERUM 0.54 (L) 2024    BUN 8 (L) 2024     2024    K 4.4 2024     2024    CO2 24.0 2024     (H) 2024    CA 9.0 2024    ALB 4.2 2024    ALKPHO 75 2024    BILT 0.2 (L) 2024    TP 6.8 2024    AST 16 2024    ALT 16 2024    PTT 75.9 (H) 2024    INR 1.09 2024    MG 2.0 2024    PHOS 3.0 2024       CT VENOGRAPHY ABDOMEN PELVIS (W/ CONTRAST) (CPT=74177)    Result Date: 2024  CONCLUSION:   Extensive DVT within the left common, internal and external iliac veins.  DVT also seen within the left common and superficial femoral veins.  Patent left-sided IVC.  Hypertrophied bilateral paraspinal  veins with thrombus in the left paraspinal veins.  Extensive venous engorgement with soft tissue and intramuscular edema within the left proximal thigh.    Dictated by (CST): Americo Cheney MD on 4/25/2024 at 11:10 AM     Finalized by (CST): Americo Cheney MD on 4/25/2024 at 11:18 AM               Assessment & Plan:       Acute left lower extremity DVT  -Patient presenting with left lower extremity pain  -Patient initially with mottling and discoloration of left lower extremity  -US venous Doppler of left leg noting extensive DVT from left external iliac vein to left upper calf veins.  -Continuing on heparin GTT  -IR consulted, recommend proceeding with venogram and thrombolysis.  Plan for 4/23.  Appreciate further recommendations  -CT chest reviewed.  Noted no signs of acute PE.  -Patient with reported history of IV contrast allergy.  Previous completed premedications prior to CT chest.  Plan for additional dosing prior to venogram and thrombolysis as above.    -Pain control as needed  -Continue to monitor     History of seizure disorder  -Continue home antiepileptics     Other medical problems include   Koolen-Agustina syndrome   Asthma   GERD  tethered cord syndrome   neurogenic bladder  Kyphoscoliosis   bilateral hip dysplasia status post bilateral varus derotation osteotomys  diaphragmatic hernia  mast cell activation syndrome      Plan of care discussed with patient and family at bedside and with Rn. Discussed management/test result(s) with IR consultant     Global A/P  -LE looks more swollen.   -unable to do thrombectomy - due to anatomy    -will need to have Lovenox subcutaneous.     - CT Venogram of Lower extremity - pre-medicate.  -Reviewed previous consultant notes  -Reviewed CBC, BMP, Mag, and Phos  -Reviewed tests ordered  -Repeat labs in am  -MDM: High, severe exacerbation of chronic illness posing a threat to life. IV medications requiring close inpatient monitoring.   -PT/OT.       52 minutes spent  discussing with other providers, examining patient, obtaining history, reviewing medical records, interpreting and communicating test results/imaging, ordering tests/medications, discussing plan of care and documenting information.      Anthony Short MD

## 2024-04-25 NOTE — PHYSICAL THERAPY NOTE
PHYSICAL THERAPY EVALUATION - INPATIENT     Room Number: 526/526-A  Evaluation Date: 4/25/2024  Type of Evaluation: Initial   Physician Order: PT Eval and Treat    Presenting Problem: acute deep vein thrombosis of iliac vein of left lower extremity     Reason for Therapy: Mobility Dysfunction and Discharge Planning    PHYSICAL THERAPY ASSESSMENT   Patient is a 18 year old female admitted 4/21/2024 for acute DVT of iliac vein of LLE.  Prior to admission, patient's baseline is independent with ADLs and functional mobility.  Patient is currently functioning below baseline with bed mobility, transfers, gait, stair negotiation, maintaining seated position, standing prolonged periods, and performing household tasks.  Patient is requiring contact guard assist as a result of the following impairments: decreased functional strength, pain, decreased muscular endurance, and decreased compliance/participation.  Physical Therapy will continue to follow for duration of hospitalization.    Patient will benefit from continued skilled PT Services     PLAN  PT Treatment Plan: Bed mobility;Body mechanics;Endurance;Energy conservation;Patient education;Gait training;Strengthening;Stair training;Transfer training;Balance training;Family education  Rehab Potential : Good  Frequency (Obs): 5x/week    PHYSICAL THERAPY MEDICAL/SOCIAL HISTORY     Problem List  Principal Problem:    Acute deep vein thrombosis (DVT) of iliac vein of left lower extremity (HCC)    HOME SITUATION  Home Situation  Type of Home: House  Home Layout: Two level;Bed/bath upstairs;Able to live on main level  Stairs to Enter : 2  Stairs to Bedroom:  (flight)  Railing: Yes  Lives With: Family  Patient Regularly Uses: None     Prior Level of [place]: independent     SUBJECTIVE  Patient started crying upon PT arrival in room but was agreeable to activity.     PHYSICAL THERAPY EXAMINATION   OBJECTIVE  Precautions: None  Fall Risk: Standard fall risk    WEIGHT BEARING  RESTRICTION  Weight Bearing Restriction: None    PAIN ASSESSMENT  Rating: Unable to rate  Location: LLE  Management Techniques: Activity promotion;Body mechanics;Breathing techniques;Relaxation;Repositioning    COGNITION  Overall Cognitive Status:  WFL - within functional limits    RANGE OF MOTION AND STRENGTH ASSESSMENT  Upper extremity ROM and strength are within functional limits.  Lower extremity ROM is within functional limits.  Lower extremity strength is within functional limits.    BALANCE  Static Sitting: Fair +  Dynamic Sitting: Fair  Static Standing: Not tested  Dynamic Standing: Not tested    ACTIVITY TOLERANCE  Pulse: (!) 150 (fluctuates but stays between 120-150 throughout session due to anxiety; medical staff and family aware and acknowledged)  Heart Rate Source: Monitor  BP: 131/86 (113/75 after returned to supine)  BP Location: Right arm  BP Method: Automatic  Patient Position: Lying    AM-PAC '6-Clicks' INPATIENT SHORT FORM - BASIC MOBILITY  How much difficulty does the patient currently have...  Patient Difficulty: Turning over in bed (including adjusting bedclothes, sheets and blankets)?: A Little   Patient Difficulty: Sitting down on and standing up from a chair with arms (e.g., wheelchair, bedside commode, etc.): A Little   Patient Difficulty: Moving from lying on back to sitting on the side of the bed?: A Little   How much help from another person does the patient currently need...   Help from Another: Moving to and from a bed to a chair (including a wheelchair)?: A Little   Help from Another: Need to walk in hospital room?: A Little   Help from Another: Climbing 3-5 steps with a railing?: A Little     AM-PAC Score:  Raw Score: 18   Approx Degree of Impairment: 46.58%   Standardized Score (AM-PAC Scale): 43.63   CMS Modifier (G-Code): CK    FUNCTIONAL ABILITY STATUS  Functional Mobility/Gait Assessment  Gait Assistance: Not tested (pt refused)  Supine to Sit: contact guard assist  Scooting:  contact guard assist  Sit to Supine: contact guard assist    Exercise/Education Provided:  Bed mobility  Functional activity tolerated  Lower therapeutic exercise:  Ankle pumps  LAQ    Additional Information: RN approved PT eval. Pt received resting in bed, with family (parents and grandmother) at bedside. Introduced self and role. Pt tearful initially upon PT arrival, due to anxiety. HR elevated in 150s due to anxiety, per family; pt on telemetry and RN staff aware. Pt requires encouragement to participate with therapy this date, and benefits from increased time for movement. Demos bed mobility with CGA, including long sitting and supine to sit at EOB, and scooting to EOB. Tolerated sitting at EOB up to 12 minutes performing ankle pumps. Attempted LAQ then did have LOB to right side, able to correct with CGA. Pt then agreeable to attempt STS; upon gait belt being donned, pt began to c/o dizziness and headache and requested to return to bed. CGA to return to supine, SBA to scoot self up in the bed. All vitals stable with activity once returned to supine. Encouraged to attempt sitting EOB with RN staff/family later as tolerated. Pt was left resting in bed with needs within reach, family at bedside. Handoff to RN complete.     The patient's Approx Degree of Impairment: 46.58% has been calculated based on documentation in the [facility] '6 clicks' Inpatient Basic Mobility Short Form.  Research supports that patients with this level of impairment may benefit from home with  PT.  Final disposition will be made by interdisciplinary medical team.    Patient End of Session: In bed;Call light within reach;Needs met;RN aware of session/findings;All patient questions and concerns addressed;Family present    CURRENT GOALS  Goals to be met by: 5/3/24  Patient Goal Patient's self-stated goal is: go home   Goal #1 Patient is able to demonstrate supine - sit EOB @ level: modified independent     Goal #1   Current Status    Goal #2  Patient is able to demonstrate transfers Sit to/from Stand at assistance level: modified independent with  none or least restrictive assistive device      Goal #2  Current Status    Goal #3 Patient is able to ambulate 150 feet with assist device:  none or least restrictive assistive device  at assistance level: supervision   Goal #3   Current Status    Goal #4 Patient will negotiate 2 stairs/one curb w/ assistive device and supervision   Goal #4   Current Status    Goal #5 Patient to demonstrate independence with home activity/exercise instructions provided to patient in preparation for discharge.   Goal #5   Current Status    Goal #6    Goal #6  Current Status      Patient Evaluation Complexity Level:  History Moderate - 1 or 2 personal factors and/or co-morbidities   Examination of body systems Moderate - addressing a total of 3 or more elements   Clinical Presentation  Moderate - Evolving   Clinical Decision Making  Moderate Complexity     Therapeutic Activity:  25 minutes

## 2024-04-25 NOTE — PHYSICAL THERAPY NOTE
PT order received, chart reviewed. Patient is currently gone for CT abdomen venogram. Will check back after results. Pt is on heparin drip for extensive LLE DVT.

## 2024-04-25 NOTE — PROGRESS NOTES
[facility]  part of [facility]  Progress Note    Nicky Nam Patient Status:  Inpatient    2005 MRN [id number]   Location [facility] 5SW/SE Attending Anthony Short MD   Hosp Day # 4 PCP Tu Yeung MD       Subjective:   In good spirits.  C/O left knee pain.      Objective:   Persistent left leg swelling.   Family at bedside.     Blood pressure 125/79, pulse 107, temperature 98.3 °F (36.8 °C), temperature source Oral, resp. rate 18, height 59\", weight 94 lb 6.4 oz (42.8 kg), last menstrual period 2024, SpO2 100%.    Results:   Labs:  Lab Results   Component Value Date    WBC 5.2 2024    RBC 3.83 2024    HGB 9.5 2024    HCT 29.8 2024    MCV 77.8 2024    MCH 24.8 2024    MCHC 31.9 2024    RDW 14.7 2024    .0 2024       Microbiology:  No results for input(s): \"URINE\", \"CULTI\", \"BLDSMR\" in the last 168 hours.    CT VENOGRAPHY ABDOMEN PELVIS (W/ CONTRAST) (CPT=74177)    Result Date: 2024  CONCLUSION:   Extensive DVT within the left common, internal and external iliac veins.  DVT also seen within the left common and superficial femoral veins.  Patent left-sided IVC.  Hypertrophied bilateral paraspinal veins with thrombus in the left paraspinal veins.  Extensive venous engorgement with soft tissue and intramuscular edema within the left proximal thigh.    Dictated by (CST): Americo Cheney MD on 2024 at 11:10 AM     Finalized by (CST): Americo Cheney MD on 2024 at 11:18 AM            Assessment and Plan:   Left leg DVT - persistent swelling.      Recommend advance activity.  Transition to Lovenox.  Follow up to discuss further recommendation as outpatient, either with Dr Padilla at [facility] or Dr Yosi Syed at [facility].    Discussed with her family.      VALERIE AGUIAR, APRN  2024

## 2024-04-25 NOTE — PLAN OF CARE
AxOx4, RA, complain of pain, gave PRN tylenol. CT done & X-ray of left knee done. Heparin drip discontinued, transitioned to Lovenox injection. Remote tele continued. Vitals done, incontinent care done. PT attempted to see patient and patient set on edge of bed. Call light within reach, all needs completed in the room.    Problem: Patient Centered Care  Goal: Patient preferences are identified and integrated in the patient's plan of care  Description: Interventions:  - What would you like us to know as we care for you? Home with family   - Provide timely, complete, and accurate information to patient/family  - Incorporate patient and family knowledge, values, beliefs, and cultural backgrounds into the planning and delivery of care  - Encourage patient/family to participate in care and decision-making at the level they choose  - Honor patient and family perspectives and choices  Outcome: Progressing     Problem: Patient/Family Goals  Goal: Patient/Family Long Term Goal  Description: Patient's Long Term Goal: resolve DVT     Interventions:  - Monitor vitals  - Monitor appropriate labs  - Administer medications as ordered  - Follow MD's orders  - Update patient on plan of care   - Discharge planning     - See additional Care Plan goals for specific interventions  Outcome: Progressing  Goal: Patient/Family Short Term Goal  Description: Patient's Short Term Goal: Transfer to [facility]     Interventions:   - Social work consult   - Follow doctor's orders   - See additional Care Plan goals for specific interventions  Outcome: Progressing     Problem: SKIN/TISSUE INTEGRITY - ADULT  Goal: Skin integrity remains intact  Description: INTERVENTIONS  - Assess and document risk factors for pressure ulcer development  - Assess and document skin integrity  - Monitor for areas of redness and/or skin breakdown  - Initiate interventions, skin care algorithm/standards of care as needed  Outcome: Progressing     Problem: HEMATOLOGIC -  ADULT  Goal: Maintains hematologic stability  Description: INTERVENTIONS  - Assess for signs and symptoms of bleeding or hemorrhage  - Monitor labs and vital signs for trends  - Administer supportive blood products/factors, fluids and medications as ordered and appropriate  - Administer supportive blood products/factors as ordered and appropriate  Outcome: Progressing  Goal: Free from bleeding injury  Description: (Example usage: patient with low platelets)  INTERVENTIONS:  - Avoid intramuscular injections, enemas and rectal medication administration  - Ensure safe mobilization of patient  - Hold pressure on venipuncture sites to achieve adequate hemostasis  - Assess for signs and symptoms of internal bleeding  - Monitor lab trends  - Patient is to report abnormal signs of bleeding to staff  - Avoid use of toothpicks and dental floss  - Use electric shaver for shaving  - Use soft bristle tooth brush  - Limit straining and forceful nose blowing  Outcome: Progressing     Problem: PAIN - ADULT  Goal: Verbalizes/displays adequate comfort level or patient's stated pain goal  Description: INTERVENTIONS:  - Encourage pt to monitor pain and request assistance  - Assess pain using appropriate pain scale  - Administer analgesics based on type and severity of pain and evaluate response  - Implement non-pharmacological measures as appropriate and evaluate response  - Consider cultural and social influences on pain and pain management  - Manage/alleviate anxiety  - Utilize distraction and/or relaxation techniques  - Monitor for opioid side effects  - Notify MD/LIP if interventions unsuccessful or patient reports new pain  - Anticipate increased pain with activity and pre-medicate as appropriate  Outcome: Progressing     Problem: RISK FOR INFECTION - ADULT  Goal: Absence of fever/infection during anticipated neutropenic period  Description: INTERVENTIONS  - Monitor WBC  - Administer growth factors as ordered  - Implement  neutropenic guidelines  Outcome: Progressing     Problem: SAFETY ADULT - FALL  Goal: Free from fall injury  Description: INTERVENTIONS:  - Assess pt frequently for physical needs  - Identify cognitive and physical deficits and behaviors that affect risk of falls.  - [place] fall precautions as indicated by assessment.  - Educate pt/family on patient safety including physical limitations  - Instruct pt to call for assistance with activity based on assessment  - Modify environment to reduce risk of injury  - Provide assistive devices as appropriate  - Consider OT/PT consult to assist with strengthening/mobility  - Encourage toileting schedule  Outcome: Progressing     Problem: DISCHARGE PLANNING  Goal: Discharge to home or other facility with appropriate resources  Description: INTERVENTIONS:  - Identify barriers to discharge w/pt and caregiver  - Include patient/family/discharge partner in discharge planning  - Arrange for needed discharge resources and transportation as appropriate  - Identify discharge learning needs (meds, wound care, etc)  - Arrange for interpreters to assist at discharge as needed  - Consider post-discharge preferences of patient/family/discharge partner  - Complete POLST form as appropriate  - Assess patient's ability to be responsible for managing their own health  - Refer to Case Management Department for coordinating discharge planning if the patient needs post-hospital services based on physician/LIP order or complex needs related to functional status, cognitive ability or social support system  Outcome: Progressing

## 2024-04-25 NOTE — PROGRESS NOTES
[facility] Dosing Service:  Enoxaparin Dosing  Nicky Nam is a 18 year old patient for whom pharmacy is dosing enoxaparin for treatment of Treatment of venous thrombosis.   Pharmacy was consulted to dose by Dr Short  Wt Readings from Last 1 Encounters:   04/21/24 42.8 kg (94 lb 6.4 oz) (<1%, Z= -2.35)*     * Growth percentiles are based on CDC (Girls, 2-20 Years) data.     Last 3 Available Creatinine:   Recent Labs     04/24/24  0622 04/25/24  0535   CREATSERUM 0.52* 0.54*     Last 3 Available Platelets:   Recent Labs     04/23/24  0614 04/24/24  0622 04/25/24  0535   .0 268.0 314.0     Latest INR: No results for input(s): \"INR\" in the last 72 hours.    Based on above   Start enoxaparin 40 mg subcutaneous every 12 hours  Draw creatinine and platelets every 3 days per protocol, if not ordered more frequently.  Pharmacy will continue to follow.  Thank you for allowing us to participate.  We appreciate the opportunity to assist in the care of this patient.  Hali Marinelli, PharmD  4/25/2024  3:04 PM

## 2024-04-25 NOTE — PLAN OF CARE
Patient vital signs stable, no c/o pain. Heparin drip running. Plan for CT in AM. No acute changes.    Problem: Patient Centered Care  Goal: Patient preferences are identified and integrated in the patient's plan of care  Description: Interventions:  - What would you like us to know as we care for you? Home with family   - Provide timely, complete, and accurate information to patient/family  - Incorporate patient and family knowledge, values, beliefs, and cultural backgrounds into the planning and delivery of care  - Encourage patient/family to participate in care and decision-making at the level they choose  - Honor patient and family perspectives and choices  Outcome: Progressing     Problem: Patient/Family Goals  Goal: Patient/Family Long Term Goal  Description: Patient's Long Term Goal: resolve DVT     Interventions:  - Monitor vitals  - Monitor appropriate labs  - Administer medications as ordered  - Follow MD's orders  - Update patient on plan of care   - Discharge planning     - See additional Care Plan goals for specific interventions  Outcome: Progressing  Goal: Patient/Family Short Term Goal  Description: Patient's Short Term Goal: Transfer to [facility]     Interventions:   - Social work consult   - Follow doctor's orders   - See additional Care Plan goals for specific interventions  Outcome: Progressing     Problem: SKIN/TISSUE INTEGRITY - ADULT  Goal: Skin integrity remains intact  Description: INTERVENTIONS  - Assess and document risk factors for pressure ulcer development  - Assess and document skin integrity  - Monitor for areas of redness and/or skin breakdown  - Initiate interventions, skin care algorithm/standards of care as needed  Outcome: Progressing     Problem: HEMATOLOGIC - ADULT  Goal: Maintains hematologic stability  Description: INTERVENTIONS  - Assess for signs and symptoms of bleeding or hemorrhage  - Monitor labs and vital signs for trends  - Administer supportive blood products/factors,  fluids and medications as ordered and appropriate  - Administer supportive blood products/factors as ordered and appropriate  Outcome: Progressing  Goal: Free from bleeding injury  Description: (Example usage: patient with low platelets)  INTERVENTIONS:  - Avoid intramuscular injections, enemas and rectal medication administration  - Ensure safe mobilization of patient  - Hold pressure on venipuncture sites to achieve adequate hemostasis  - Assess for signs and symptoms of internal bleeding  - Monitor lab trends  - Patient is to report abnormal signs of bleeding to staff  - Avoid use of toothpicks and dental floss  - Use electric shaver for shaving  - Use soft bristle tooth brush  - Limit straining and forceful nose blowing  Outcome: Progressing     Problem: PAIN - ADULT  Goal: Verbalizes/displays adequate comfort level or patient's stated pain goal  Description: INTERVENTIONS:  - Encourage pt to monitor pain and request assistance  - Assess pain using appropriate pain scale  - Administer analgesics based on type and severity of pain and evaluate response  - Implement non-pharmacological measures as appropriate and evaluate response  - Consider cultural and social influences on pain and pain management  - Manage/alleviate anxiety  - Utilize distraction and/or relaxation techniques  - Monitor for opioid side effects  - Notify MD/LIP if interventions unsuccessful or patient reports new pain  - Anticipate increased pain with activity and pre-medicate as appropriate  Outcome: Progressing     Problem: RISK FOR INFECTION - ADULT  Goal: Absence of fever/infection during anticipated neutropenic period  Description: INTERVENTIONS  - Monitor WBC  - Administer growth factors as ordered  - Implement neutropenic guidelines  Outcome: Progressing     Problem: SAFETY ADULT - FALL  Goal: Free from fall injury  Description: INTERVENTIONS:  - Assess pt frequently for physical needs  - Identify cognitive and physical deficits and  behaviors that affect risk of falls.  - [place] fall precautions as indicated by assessment.  - Educate pt/family on patient safety including physical limitations  - Instruct pt to call for assistance with activity based on assessment  - Modify environment to reduce risk of injury  - Provide assistive devices as appropriate  - Consider OT/PT consult to assist with strengthening/mobility  - Encourage toileting schedule  Outcome: Progressing     Problem: DISCHARGE PLANNING  Goal: Discharge to home or other facility with appropriate resources  Description: INTERVENTIONS:  - Identify barriers to discharge w/pt and caregiver  - Include patient/family/discharge partner in discharge planning  - Arrange for needed discharge resources and transportation as appropriate  - Identify discharge learning needs (meds, wound care, etc)  - Arrange for interpreters to assist at discharge as needed  - Consider post-discharge preferences of patient/family/discharge partner  - Complete POLST form as appropriate  - Assess patient's ability to be responsible for managing their own health  - Refer to Case Management Department for coordinating discharge planning if the patient needs post-hospital services based on physician/LIP order or complex needs related to functional status, cognitive ability or social support system  Outcome: Progressing

## 2024-04-26 LAB
ANION GAP SERPL CALC-SCNC: 4 MMOL/L (ref 0–18)
BASOPHILS # BLD AUTO: 0.01 X10(3) UL (ref 0–0.2)
BASOPHILS NFR BLD AUTO: 0.2 %
BUN BLD-MCNC: 13 MG/DL (ref 9–23)
BUN/CREAT SERPL: 20 (ref 10–20)
CALCIUM BLD-MCNC: 8.8 MG/DL (ref 8.7–10.4)
CHLORIDE SERPL-SCNC: 108 MMOL/L (ref 98–112)
CO2 SERPL-SCNC: 27 MMOL/L (ref 21–32)
CREAT BLD-MCNC: 0.65 MG/DL
DEPRECATED RDW RBC AUTO: 41.3 FL (ref 35.1–46.3)
EGFRCR SERPLBLD CKD-EPI 2021: 130 ML/MIN/1.73M2 (ref 60–?)
EOSINOPHIL # BLD AUTO: 0.02 X10(3) UL (ref 0–0.7)
EOSINOPHIL NFR BLD AUTO: 0.4 %
ERYTHROCYTE [DISTWIDTH] IN BLOOD BY AUTOMATED COUNT: 14.6 % (ref 11–15)
GLUCOSE BLD-MCNC: 116 MG/DL (ref 70–99)
HCT VFR BLD AUTO: 26.6 %
HGB BLD-MCNC: 8.5 G/DL
IMM GRANULOCYTES # BLD AUTO: 0.02 X10(3) UL (ref 0–1)
IMM GRANULOCYTES NFR BLD: 0.4 %
LYMPHOCYTES # BLD AUTO: 0.91 X10(3) UL (ref 1.5–5)
LYMPHOCYTES NFR BLD AUTO: 17.9 %
MCH RBC QN AUTO: 24.8 PG (ref 26–34)
MCHC RBC AUTO-ENTMCNC: 32 G/DL (ref 31–37)
MCV RBC AUTO: 77.6 FL
MONOCYTES # BLD AUTO: 0.71 X10(3) UL (ref 0.1–1)
MONOCYTES NFR BLD AUTO: 14 %
NEUTROPHILS # BLD AUTO: 3.4 X10 (3) UL (ref 1.5–7.7)
NEUTROPHILS # BLD AUTO: 3.4 X10(3) UL (ref 1.5–7.7)
NEUTROPHILS NFR BLD AUTO: 67.1 %
OSMOLALITY SERPL CALC.SUM OF ELEC: 289 MOSM/KG (ref 275–295)
PLATELET # BLD AUTO: 271 10(3)UL (ref 150–450)
POTASSIUM SERPL-SCNC: 3.9 MMOL/L (ref 3.5–5.1)
RBC # BLD AUTO: 3.43 X10(6)UL
SODIUM SERPL-SCNC: 139 MMOL/L (ref 136–145)
WBC # BLD AUTO: 5.1 X10(3) UL (ref 4–11)

## 2024-04-26 RX ORDER — SODIUM CHLORIDE 1 G/1
1 TABLET ORAL
Status: DISCONTINUED | OUTPATIENT
Start: 2024-04-26 | End: 2024-04-30

## 2024-04-26 NOTE — PROGRESS NOTES
[facility]  part of [facility]  Progress Note      Nicky Nam Patient Status:  Inpatient    2005 MRN [id number]   Location [facility] 5SW/SE Attending Anthony Short MD   Hosp Day # 5 PCP Tu Yeung MD       Subjective:   Mom present. States that swelling has gotten better this morning only, still having pain. Patient has not been able to ambulate yet. Performing foot pumping exercises.    Objective:   Seen and examined with Dr Randy CHING/OX3, no acute distress  LLE edema foot to thigh, dorsalis pedis pulse 2+     Vital Signs:  Blood pressure 102/64, pulse 93, temperature 98.2 °F (36.8 °C), temperature source Oral, resp. rate 18, height 59\", weight 94 lb 6.4 oz (42.8 kg), last menstrual period 2024, SpO2 100%.    Input/Output:    Intake/Output Summary (Last 24 hours) at 2024 0913  Last data filed at 2024 0539  Gross per 24 hour   Intake 610 ml   Output 600 ml   Net 10 ml     Results:   Labs:  Lab Results   Component Value Date    WBC 5.1 2024    RBC 3.43 2024    HGB 8.5 2024    HCT 26.6 2024    MCV 77.6 2024    MCH 24.8 2024    MCHC 32.0 2024    RDW 14.6 2024    .0 2024     Recent Labs   Lab 24  0622 24  0535 24  0535   GLU 93 144* 116*   BUN 11 8* 13   CREATSERUM 0.52* 0.54* 0.65   EGFRCR 137 136 130   CA 8.3* 9.0 8.8    138 139   K 3.8 4.4 3.9    107 108   CO2 24.0 24.0 27.0     XR KNEE (1 OR 2 VIEWS), LEFT (CPT=73560)    Result Date: 2024  CONCLUSION:  1. No acute appearing fracture or dislocation.  No significant arthropathy.  Moderate bony de ossification on usual or patient's age.  Correlate clinically. 2. Diffuse soft tissue swelling about the knee especially in the prepatellar region.  Correlate clinically.    Dictated by (CST): Adama Allred MD on 2024 at 2:29 PM     Finalized by (CST): Adama Allred MD on 2024 at 2:30 PM           CT VENOGRAPHY ABDOMEN PELVIS (W/ CONTRAST) (CPT=74177)    Result Date: 4/25/2024  CONCLUSION:   Extensive DVT within the left common, internal and external iliac veins.  DVT also seen within the left common and superficial femoral veins.  Patent left-sided IVC.  Hypertrophied bilateral paraspinal veins with thrombus in the left paraspinal veins.  Extensive venous engorgement with soft tissue and intramuscular edema within the left proximal thigh.    Dictated by (CST): Americo Cheney MD on 4/25/2024 at 11:10 AM     Finalized by (CST): Americo Cheney MD on 4/25/2024 at 11:18 AM             Assessment and Plan:   17 yo female with extensive LLE DVT  Seen and examined with Dr Padilla   Persistent LLE edema, better this morning per patient's mom, still has not been able to ambulate  Switched over to lovenox 4/25/24  Continue lovenox and physical therapy, if swelling persists consider thrombectomy procedure with IR  IR recommends continued hospitalization throughout the weekend    CARRINGTON Hill  4/26/2024  9:13 AM

## 2024-04-26 NOTE — PLAN OF CARE
Problem: Patient Centered Care  Goal: Patient preferences are identified and integrated in the patient's plan of care  Description: Interventions:  - What would you like us to know as we care for you? Home with family   - Provide timely, complete, and accurate information to patient/family  - Incorporate patient and family knowledge, values, beliefs, and cultural backgrounds into the planning and delivery of care  - Encourage patient/family to participate in care and decision-making at the level they choose  - Honor patient and family perspectives and choices  Outcome: Progressing     Problem: Patient/Family Goals  Goal: Patient/Family Long Term Goal  Description: Patient's Long Term Goal: resolve DVT     Interventions:  - Monitor vitals  - Monitor appropriate labs  - Administer medications as ordered  - Follow MD's orders  - Update patient on plan of care   - Discharge planning     - See additional Care Plan goals for specific interventions  Outcome: Progressing  Goal: Patient/Family Short Term Goal  Description: Patient's Short Term Goal:  get stronger and move more     Interventions:   - Social work consult   - Follow doctor's orders   - See additional Care Plan goals for specific interventions  Outcome: Progressing     Problem: SKIN/TISSUE INTEGRITY - ADULT  Goal: Skin integrity remains intact  Description: INTERVENTIONS  - Assess and document risk factors for pressure ulcer development  - Assess and document skin integrity  - Monitor for areas of redness and/or skin breakdown  - Initiate interventions, skin care algorithm/standards of care as needed  Outcome: Progressing     Problem: HEMATOLOGIC - ADULT  Goal: Maintains hematologic stability  Description: INTERVENTIONS  - Assess for signs and symptoms of bleeding or hemorrhage  - Monitor labs and vital signs for trends  - Administer supportive blood products/factors, fluids and medications as ordered and appropriate  - Administer supportive blood  products/factors as ordered and appropriate  Outcome: Progressing  Goal: Free from bleeding injury  Description: (Example usage: patient with low platelets)  INTERVENTIONS:  - Avoid intramuscular injections, enemas and rectal medication administration  - Ensure safe mobilization of patient  - Hold pressure on venipuncture sites to achieve adequate hemostasis  - Assess for signs and symptoms of internal bleeding  - Monitor lab trends  - Patient is to report abnormal signs of bleeding to staff  - Avoid use of toothpicks and dental floss  - Use electric shaver for shaving  - Use soft bristle tooth brush  - Limit straining and forceful nose blowing  Outcome: Progressing     Problem: PAIN - ADULT  Goal: Verbalizes/displays adequate comfort level or patient's stated pain goal  Description: INTERVENTIONS:  - Encourage pt to monitor pain and request assistance  - Assess pain using appropriate pain scale  - Administer analgesics based on type and severity of pain and evaluate response  - Implement non-pharmacological measures as appropriate and evaluate response  - Consider cultural and social influences on pain and pain management  - Manage/alleviate anxiety  - Utilize distraction and/or relaxation techniques  - Monitor for opioid side effects  - Notify MD/LIP if interventions unsuccessful or patient reports new pain  - Anticipate increased pain with activity and pre-medicate as appropriate  Outcome: Progressing     Problem: RISK FOR INFECTION - ADULT  Goal: Absence of fever/infection during anticipated neutropenic period  Description: INTERVENTIONS  - Monitor WBC  - Administer growth factors as ordered  - Implement neutropenic guidelines  Outcome: Progressing     Problem: SAFETY ADULT - FALL  Goal: Free from fall injury  Description: INTERVENTIONS:  - Assess pt frequently for physical needs  - Identify cognitive and physical deficits and behaviors that affect risk of falls.  - [place] fall precautions as indicated by  assessment.  - Educate pt/family on patient safety including physical limitations  - Instruct pt to call for assistance with activity based on assessment  - Modify environment to reduce risk of injury  - Provide assistive devices as appropriate  - Consider OT/PT consult to assist with strengthening/mobility  - Encourage toileting schedule  Outcome: Progressing     Problem: DISCHARGE PLANNING  Goal: Discharge to home or other facility with appropriate resources  Description: INTERVENTIONS:  - Identify barriers to discharge w/pt and caregiver  - Include patient/family/discharge partner in discharge planning  - Arrange for needed discharge resources and transportation as appropriate  - Identify discharge learning needs (meds, wound care, etc)  - Arrange for interpreters to assist at discharge as needed  - Consider post-discharge preferences of patient/family/discharge partner  - Complete POLST form as appropriate  - Assess patient's ability to be responsible for managing their own health  - Refer to Case Management Department for coordinating discharge planning if the patient needs post-hospital services based on physician/LIP order or complex needs related to functional status, cognitive ability or social support system  Outcome: Progressing     No acute changes overnight. Monitoring vitals. Mom at bedside. Safety measures in place. Call light within reach.

## 2024-04-26 NOTE — PROGRESS NOTES
[facility]  part of [facility]    Progress Note    Nicky Nam Patient Status:  Inpatient    2005 MRN [id number]   Location [facility] 5SW/SE Attending Anthony Short MD   Hosp Day # 5 PCP Tu Yeung MD     Chief Complaint:     Acute DVT     Subjective:   Subjective:    Patient seen and examined this morning  Afebrile, normotensive  Episode of tachycardia - anxiety related  Better spirits today  Mother at bedside    Objective:   Blood pressure 124/74, pulse (!) 121, temperature 98.2 °F (36.8 °C), temperature source Oral, resp. rate 18, height 4' 11\" (1.499 m), weight 94 lb 6.4 oz (42.8 kg), last menstrual period 2024, SpO2 100%.  Physical Exam    General: Patient is alert and oriented  not appear to be in acute distress at this time, development delay  HEENT: EOMI PERRLA, atraumatic normocephalic  Cardiac: S1-S2 appreciated  Lungs: Good air entry bilaterally clear to auscultation  Abdomen: Soft nontender nondistended positive bowel sounds  Ext: Peripheral pulses are positive, LLE edema  Neuro: No focal deficits noted      Results:   Lab Results   Component Value Date    WBC 5.1 2024    HGB 8.5 (L) 2024    HCT 26.6 (L) 2024    .0 2024    CREATSERUM 0.65 2024    BUN 13 2024     2024    K 3.9 2024     2024    CO2 27.0 2024     (H) 2024    CA 8.8 2024    ALB 4.2 2024    ALKPHO 75 2024    BILT 0.2 (L) 2024    TP 6.8 2024    AST 16 2024    ALT 16 2024    PTT 75.9 (H) 2024    INR 1.09 2024    MG 2.0 2024    PHOS 3.0 2024       XR KNEE (1 OR 2 VIEWS), LEFT (CPT=73560)    Result Date: 2024  CONCLUSION:  1. No acute appearing fracture or dislocation.  No significant arthropathy.  Moderate bony de ossification on usual or patient's age.  Correlate clinically. 2. Diffuse soft tissue swelling about the  knee especially in the prepatellar region.  Correlate clinically.    Dictated by (CST): Adama Allred MD on 4/25/2024 at 2:29 PM     Finalized by (CST): Adama Allred MD on 4/25/2024 at 2:30 PM          CT VENOGRAPHY ABDOMEN PELVIS (W/ CONTRAST) (CPT=74177)    Result Date: 4/25/2024  CONCLUSION:   Extensive DVT within the left common, internal and external iliac veins.  DVT also seen within the left common and superficial femoral veins.  Patent left-sided IVC.  Hypertrophied bilateral paraspinal veins with thrombus in the left paraspinal veins.  Extensive venous engorgement with soft tissue and intramuscular edema within the left proximal thigh.    Dictated by (CST): Americo Cheney MD on 4/25/2024 at 11:10 AM     Finalized by (CST): Americo Cheney MD on 4/25/2024 at 11:18 AM               Assessment & Plan:       Acute left lower extremity DVT  -Patient presenting with left lower extremity pain  -Patient initially with mottling and discoloration of left lower extremity  -US venous Doppler of left leg noting extensive DVT from left external iliac vein to left upper calf veins.  -Continuing on heparin GTT  -IR consulted, recommend proceeding with venogram and thrombolysis.  Plan for 4/23.  Appreciate further recommendations  -CT chest reviewed.  Noted no signs of acute PE.  -Patient with reported history of IV contrast allergy.  Previous completed premedications prior to CT chest.  Plan for additional dosing prior to venogram and thrombolysis as above.    -Pain control as needed  -Continue to monitor     History of seizure disorder  -Continue home antiepileptics     Other medical problems include   Koolen-Agustina syndrome   Asthma   GERD  tethered cord syndrome   neurogenic bladder  Kyphoscoliosis   bilateral hip dysplasia status post bilateral varus derotation osteotomys  diaphragmatic hernia  mast cell activation syndrome      Plan of care discussed with patient and family at bedside and with Rn. Discussed  management/test result(s) with IR consultant     Global A/P  -LE looks more swollen.   -unable to do thrombectomy - due to anatomy    -transitioned to Lovenox subcutaneous therapeutic dose  -will need to have Lovenox subcutaneous.     - CT Venogram of Lower extremity - pre-medicate - done   - appreciate IR recs   -Reviewed previous consultant notes  -Reviewed CBC, BMP, Mag, and Phos  -Reviewed tests ordered  -Repeat labs in am  -MDM: High, severe exacerbation of chronic illness posing a threat to life. IV medications requiring close inpatient monitoring.   -PT/OT - DC planning.         Anthony Short MD

## 2024-04-26 NOTE — PLAN OF CARE
Patient is Aox4, room air, elevated heart rate with anxiety, nonsustained, attending aware, call light within reach, fall precautions maintained, rounding maintained.        Problem: SKIN/TISSUE INTEGRITY - ADULT  Goal: Skin integrity remains intact  Description: INTERVENTIONS  - Assess and document risk factors for pressure ulcer development  - Assess and document skin integrity  - Monitor for areas of redness and/or skin breakdown  - Initiate interventions, skin care algorithm/standards of care as needed  Outcome: Progressing     Problem: HEMATOLOGIC - ADULT  Goal: Maintains hematologic stability  Description: INTERVENTIONS  - Assess for signs and symptoms of bleeding or hemorrhage  - Monitor labs and vital signs for trends  - Administer supportive blood products/factors, fluids and medications as ordered and appropriate  - Administer supportive blood products/factors as ordered and appropriate  Outcome: Progressing     Problem: PAIN - ADULT  Goal: Verbalizes/displays adequate comfort level or patient's stated pain goal  Description: INTERVENTIONS:  - Encourage pt to monitor pain and request assistance  - Assess pain using appropriate pain scale  - Administer analgesics based on type and severity of pain and evaluate response  - Implement non-pharmacological measures as appropriate and evaluate response  - Consider cultural and social influences on pain and pain management  - Manage/alleviate anxiety  - Utilize distraction and/or relaxation techniques  - Monitor for opioid side effects  - Notify MD/LIP if interventions unsuccessful or patient reports new pain  - Anticipate increased pain with activity and pre-medicate as appropriate  Outcome: Progressing

## 2024-04-26 NOTE — PAYOR COMM NOTE
--------------  4/26:  CONTINUED STAY REVIEW    Payor: TAMANNA MINNIE  Subscriber #:  [id number]  Authorization Number: [id number]    Admit date: 4/21/24  Admit time:  3:34 PM    HOSPITALIST:    Chief Complaint:      Acute DVT         Subjective:  Subjective:     Patient seen and examined this morning  Afebrile, normotensive  Episode of tachycardia - anxiety related  Better spirits today  Mother at bedside    Assessment & Plan:  Acute left lower extremity DVT  -Patient presenting with left lower extremity pain  -Patient initially with mottling and discoloration of left lower extremity  -US venous Doppler of left leg noting extensive DVT from left external iliac vein to left upper calf veins.  -Continuing on heparin GTT  -IR consulted, recommend proceeding with venogram and thrombolysis.  Plan for 4/23.  Appreciate further recommendations  -CT chest reviewed.  Noted no signs of acute PE.  -Patient with reported history of IV contrast allergy.  Previous completed premedications prior to CT chest.  Plan for additional dosing prior to venogram and thrombolysis as above.    -Pain control as needed  -Continue to monitor     History of seizure disorder  -Continue home antiepileptics     Other medical problems include   Koolen-Agustina syndrome   Asthma   GERD  tethered cord syndrome   neurogenic bladder  Kyphoscoliosis   bilateral hip dysplasia status post bilateral varus derotation osteotomys  diaphragmatic hernia  mast cell activation syndrome      Plan of care discussed with patient and family at bedside and with Rn. Discussed management/test result(s) with IR consultant     Global A/P  -LE looks more swollen.   -unable to do thrombectomy - due to anatomy     -transitioned to Lovenox subcutaneous therapeutic dose  -will need to have Lovenox subcutaneous.                   - CT Venogram of Lower extremity - pre-medicate - done                 - appreciate IR recs   -Reviewed previous consultant notes  -Reviewed CBC, BMP,  Mag, and Phos  -Reviewed tests ordered  -Repeat labs in am  -MDM: High, severe exacerbation of chronic illness posing a threat to life. IV medications requiring close inpatient monitoring.   -PT/OT - DC planning.    Anthony Short MD     4/26 IR:     Subjective:   Mom present. States that swelling has gotten better this morning only, still having pain. Patient has not been able to ambulate yet. Performing foot pumping exercises.     Objective:   Seen and examined with Dr Padilla  A/OX3, no acute distress  LLE edema foot to thigh, dorsalis pedis pulse 2+     Assessment and Plan:   19 yo female with extensive LLE DVT  Seen and examined with Dr Padilla   Persistent LLE edema, better this morning per patient's mom, still has not been able to ambulate  Switched over to lovenox 4/25/24  Continue lovenox and physical therapy, if swelling persists consider thrombectomy procedure with IR  IR recommends continued hospitalization throughout the weekend     CARRINGTON Hill  4/26/2024  9:13 AM    MEDICATIONS ADMINISTERED IN LAST 1 DAY:  acetaminophen (Tylenol Extra Strength) tab 500 mg       Date Action Dose Route User    4/26/2024 0556 Given 500 mg Oral Jacqueline Yousif RN    4/25/2024 1424 Given 500 mg Oral Radha Littlejohn RN          carBAMazepine ER (TEGRETOL XR) 12 hr tab 300 mg       Date Action Dose Route User    4/26/2024 0929 Given 300 mg Oral Yessy Delaney RN    4/25/2024 2100 Given 300 mg Oral Jacqueline Yousif RN          enoxaparin (Lovenox) 40 MG/0.4ML SUBQ injection 40 mg       Date Action Dose Route User    4/26/2024 0929 Given 40 mg Subcutaneous (Left Lower Abdomen) Yessy Delaney RN    4/25/2024 1634 Given 40 mg Subcutaneous (Right Lower Abdomen) Radha Littlejohn RN          famotidine (Pepcid) tab 20 mg       Date Action Dose Route User    4/26/2024 0932 Given 20 mg Oral Yessy Delaney RN    4/25/2024 2100 Given 20 mg Oral Jacqueline Yousif RN          Allegra Allergy (Fexofenadine) 180mg 24 hr tablet - pt's  own medication       Date Action Dose Route User    4/26/2024 0931 Given 180 mg Oral Yessy Delaney RN    4/25/2024 2100 Given 180 mg Oral Jacqueline Yousif RN          sennosides (Senokot) tab 8.6 mg       Date Action Dose Route User    4/26/2024 0932 Given 8.6 mg Oral Yessy Delaney RN            Vitals (last day)       Date/Time Temp Pulse Resp BP SpO2 Weight O2 Device O2 Flow Rate (L/min) [facility]    04/26/24 0932 98.2 °F (36.8 °C) 121 18 124/74 100 % -- None (Room air) -- AO    04/26/24 0522 98.2 °F (36.8 °C) 93 18 102/64 100 % -- None (Room air) -- CG    04/25/24 2036 99.5 °F (37.5 °C) 113 18 -- 100 % -- None (Room air) -- CG    04/25/24 1905 -- 103 -- -- -- -- -- -- GT    04/25/24 1633 97.7 °F (36.5 °C) -- 18 140/79 100 % -- None (Room air) -- AP    04/25/24 1502 -- 150 -- 131/86 -- -- -- -- DT    04/25/24 0800 98.3 °F (36.8 °C) 107 18 125/79 100 % -- None (Room air) -- AP    04/25/24 0549 97.5 °F (36.4 °C) 82 18 112/67 99 % -- None (Room air) -- IB    04/25/24 0400 -- 74 -- -- -- -- -- -- SW

## 2024-04-27 ENCOUNTER — APPOINTMENT (OUTPATIENT)
Dept: CV DIAGNOSTICS | Facility: HOSPITAL | Age: 19
End: 2024-04-27
Attending: HOSPITALIST
Payer: COMMERCIAL

## 2024-04-27 LAB
BASOPHILS # BLD AUTO: 0.02 X10(3) UL (ref 0–0.2)
BASOPHILS NFR BLD AUTO: 0.3 %
DEPRECATED RDW RBC AUTO: 42.1 FL (ref 35.1–46.3)
EOSINOPHIL # BLD AUTO: 0.17 X10(3) UL (ref 0–0.7)
EOSINOPHIL NFR BLD AUTO: 2.8 %
ERYTHROCYTE [DISTWIDTH] IN BLOOD BY AUTOMATED COUNT: 14.8 % (ref 11–15)
HCT VFR BLD AUTO: 26.5 %
HGB BLD-MCNC: 8.3 G/DL
IMM GRANULOCYTES # BLD AUTO: 0.04 X10(3) UL (ref 0–1)
IMM GRANULOCYTES NFR BLD: 0.7 %
LYMPHOCYTES # BLD AUTO: 1.4 X10(3) UL (ref 1.5–5)
LYMPHOCYTES NFR BLD AUTO: 22.8 %
MAGNESIUM SERPL-MCNC: 1.8 MG/DL (ref 1.6–2.6)
MCH RBC QN AUTO: 24.5 PG (ref 26–34)
MCHC RBC AUTO-ENTMCNC: 31.3 G/DL (ref 31–37)
MCV RBC AUTO: 78.2 FL
MONOCYTES # BLD AUTO: 0.99 X10(3) UL (ref 0.1–1)
MONOCYTES NFR BLD AUTO: 16.1 %
NEUTROPHILS # BLD AUTO: 3.53 X10 (3) UL (ref 1.5–7.7)
NEUTROPHILS # BLD AUTO: 3.53 X10(3) UL (ref 1.5–7.7)
NEUTROPHILS NFR BLD AUTO: 57.3 %
PHOSPHATE SERPL-MCNC: 3.2 MG/DL (ref 2.4–5.1)
PLATELET # BLD AUTO: 242 10(3)UL (ref 150–450)
RBC # BLD AUTO: 3.39 X10(6)UL
WBC # BLD AUTO: 6.2 X10(3) UL (ref 4–11)

## 2024-04-27 PROCEDURE — 99233 SBSQ HOSP IP/OBS HIGH 50: CPT | Performed by: HOSPITALIST

## 2024-04-27 RX ORDER — MAGNESIUM OXIDE 400 MG/1
400 TABLET ORAL ONCE
Status: COMPLETED | OUTPATIENT
Start: 2024-04-27 | End: 2024-04-27

## 2024-04-27 NOTE — PLAN OF CARE
Problem: Patient Centered Care  Goal: Patient preferences are identified and integrated in the patient's plan of care  Description: Interventions:  - What would you like us to know as we care for you? Home with family   - Provide timely, complete, and accurate information to patient/family  - Incorporate patient and family knowledge, values, beliefs, and cultural backgrounds into the planning and delivery of care  - Encourage patient/family to participate in care and decision-making at the level they choose  - Honor patient and family perspectives and choices  4/27/2024 1726 by Sadia Del Rosario RN  Outcome: Progressing       Problem: Patient/Family Goals  Goal: Patient/Family Long Term Goal  Description: Patient's Long Term Goal: resolve DVT     Interventions:  - Monitor vitals  - Monitor appropriate labs  - Administer medications as ordered  - Follow MD's orders  - Update patient on plan of care   - Discharge planning     - See additional Care Plan goals for specific interventions  4/27/2024 1726 by Sadia Del Rosario RN  Outcome: Progressing    Goal: Patient/Family Short Term Goal  Description: Patient's Short Term Goal: Transfer to [facility]     Interventions:   - Social work consult   - Follow doctor's orders   - See additional Care Plan goals for specific interventions  4/27/2024 1726 by Sadia Del Rosario RN  Outcome: Progressing    Problem: SKIN/TISSUE INTEGRITY - ADULT  Goal: Skin integrity remains intact  Description: INTERVENTIONS  - Assess and document risk factors for pressure ulcer development  - Assess and document skin integrity  - Monitor for areas of redness and/or skin breakdown  - Initiate interventions, skin care algorithm/standards of care as needed  4/27/2024 1726 by Sadia Del Rosario RN  Outcome: Progressing       Problem: HEMATOLOGIC - ADULT  Goal: Maintains hematologic stability  Description: INTERVENTIONS  - Assess for signs and symptoms of bleeding or hemorrhage  - Monitor labs and  vital signs for trends  - Administer supportive blood products/factors, fluids and medications as ordered and appropriate  - Administer supportive blood products/factors as ordered and appropriate  4/27/2024 1726 by Sadia Del Rosario, RN  Outcome: Progressing    Goal: Free from bleeding injury  Description: (Example usage: patient with low platelets)  INTERVENTIONS:  - Avoid intramuscular injections, enemas and rectal medication administration  - Ensure safe mobilization of patient  - Hold pressure on venipuncture sites to achieve adequate hemostasis  - Assess for signs and symptoms of internal bleeding  - Monitor lab trends  - Patient is to report abnormal signs of bleeding to staff  - Avoid use of toothpicks and dental floss  - Use electric shaver for shaving  - Use soft bristle tooth brush  - Limit straining and forceful nose blowing  4/27/2024 1726 by Sadia Del Rosario, RN  Outcome: Progressing     Problem: RISK FOR INFECTION - ADULT  Goal: Absence of fever/infection during anticipated neutropenic period  Description: INTERVENTIONS  - Monitor WBC  - Administer growth factors as ordered  - Implement neutropenic guidelines  4/27/2024 1726 by Sadia Del Rosario, RN  Outcome: Progressing    Echo done at bedside, results pending. Up to chair throughout the day. Son at bedside, updated on plan. Calls appropriately.

## 2024-04-27 NOTE — PLAN OF CARE
Monitoring vital signs, stable at this time. No acute changes at this moment. Safety, fall, and seizure precautions in place, bed locked and in lowest position, call light in reach and randy hose on both legs. Frequent rounding by nursing staff.      Problem: Patient Centered Care  Goal: Patient preferences are identified and integrated in the patient's plan of care  Description: Interventions:  - What would you like us to know as we care for you? Home with family   - Provide timely, complete, and accurate information to patient/family  - Incorporate patient and family knowledge, values, beliefs, and cultural backgrounds into the planning and delivery of care  - Encourage patient/family to participate in care and decision-making at the level they choose  - Honor patient and family perspectives and choices  Outcome: Progressing     Problem: Patient/Family Goals  Goal: Patient/Family Long Term Goal  Description: Patient's Long Term Goal: resolve DVT     Interventions:  - Monitor vitals  - Monitor appropriate labs  - Administer medications as ordered  - Follow MD's orders  - Update patient on plan of care   - Discharge planning     - See additional Care Plan goals for specific interventions  Outcome: Progressing  Goal: Patient/Family Short Term Goal  Description: Patient's Short Term Goal: Transfer to [facility]     Interventions:   - Social work consult   - Follow doctor's orders   - See additional Care Plan goals for specific interventions  Outcome: Progressing     Problem: SKIN/TISSUE INTEGRITY - ADULT  Goal: Skin integrity remains intact  Description: INTERVENTIONS  - Assess and document risk factors for pressure ulcer development  - Assess and document skin integrity  - Monitor for areas of redness and/or skin breakdown  - Initiate interventions, skin care algorithm/standards of care as needed  Outcome: Progressing     Problem: HEMATOLOGIC - ADULT  Goal: Maintains hematologic stability  Description: INTERVENTIONS  -  Assess for signs and symptoms of bleeding or hemorrhage  - Monitor labs and vital signs for trends  - Administer supportive blood products/factors, fluids and medications as ordered and appropriate  - Administer supportive blood products/factors as ordered and appropriate  Outcome: Progressing  Goal: Free from bleeding injury  Description: (Example usage: patient with low platelets)  INTERVENTIONS:  - Avoid intramuscular injections, enemas and rectal medication administration  - Ensure safe mobilization of patient  - Hold pressure on venipuncture sites to achieve adequate hemostasis  - Assess for signs and symptoms of internal bleeding  - Monitor lab trends  - Patient is to report abnormal signs of bleeding to staff  - Avoid use of toothpicks and dental floss  - Use electric shaver for shaving  - Use soft bristle tooth brush  - Limit straining and forceful nose blowing  Outcome: Progressing     Problem: PAIN - ADULT  Goal: Verbalizes/displays adequate comfort level or patient's stated pain goal  Description: INTERVENTIONS:  - Encourage pt to monitor pain and request assistance  - Assess pain using appropriate pain scale  - Administer analgesics based on type and severity of pain and evaluate response  - Implement non-pharmacological measures as appropriate and evaluate response  - Consider cultural and social influences on pain and pain management  - Manage/alleviate anxiety  - Utilize distraction and/or relaxation techniques  - Monitor for opioid side effects  - Notify MD/LIP if interventions unsuccessful or patient reports new pain  - Anticipate increased pain with activity and pre-medicate as appropriate  Outcome: Progressing     Problem: RISK FOR INFECTION - ADULT  Goal: Absence of fever/infection during anticipated neutropenic period  Description: INTERVENTIONS  - Monitor WBC  - Administer growth factors as ordered  - Implement neutropenic guidelines  Outcome: Progressing     Problem: SAFETY ADULT - FALL  Goal:  Free from fall injury  Description: INTERVENTIONS:  - Assess pt frequently for physical needs  - Identify cognitive and physical deficits and behaviors that affect risk of falls.  - [place] fall precautions as indicated by assessment.  - Educate pt/family on patient safety including physical limitations  - Instruct pt to call for assistance with activity based on assessment  - Modify environment to reduce risk of injury  - Provide assistive devices as appropriate  - Consider OT/PT consult to assist with strengthening/mobility  - Encourage toileting schedule  Outcome: Progressing     Problem: DISCHARGE PLANNING  Goal: Discharge to home or other facility with appropriate resources  Description: INTERVENTIONS:  - Identify barriers to discharge w/pt and caregiver  - Include patient/family/discharge partner in discharge planning  - Arrange for needed discharge resources and transportation as appropriate  - Identify discharge learning needs (meds, wound care, etc)  - Arrange for interpreters to assist at discharge as needed  - Consider post-discharge preferences of patient/family/discharge partner  - Complete POLST form as appropriate  - Assess patient's ability to be responsible for managing their own health  - Refer to Case Management Department for coordinating discharge planning if the patient needs post-hospital services based on physician/LIP order or complex needs related to functional status, cognitive ability or social support system  Outcome: Progressing

## 2024-04-27 NOTE — PROGRESS NOTES
[facility]  part of [facility]    Progress Note    Nicky Nam Patient Status:  Inpatient    2005 MRN [id number]   Location [facility] 5SW/SE Attending Anthony Short MD   Hosp Day # 6 PCP Tu Yeung MD     Chief Complaint:     Acute DVT     Subjective:   Subjective:    Patient seen and examined this morning  Afebrile, normotensive  Episode of tachycardia - anxiety related  Better spirits today, states leg feels slightly better  Mother at bedside    Objective:   Blood pressure 102/68, pulse 93, temperature 98.6 °F (37 °C), temperature source Temporal, resp. rate 18, height 4' 11\" (1.499 m), weight 94 lb 6.4 oz (42.8 kg), last menstrual period 2024, SpO2 100%.  Physical Exam    General: Patient is alert and oriented  not appear to be in acute distress at this time, development delay  HEENT: EOMI PERRLA, atraumatic normocephalic  Cardiac: S1-S2 appreciated  Lungs: Good air entry bilaterally clear to auscultation  Abdomen: Soft nontender nondistended positive bowel sounds  Ext: Peripheral pulses are positive, LLE edema  Neuro: No focal deficits noted      Results:   Lab Results   Component Value Date    WBC 6.2 2024    HGB 8.3 (L) 2024    HCT 26.5 (L) 2024    .0 2024    CREATSERUM 0.65 2024    BUN 13 2024     2024    K 3.9 2024     2024    CO2 27.0 2024     (H) 2024    CA 8.8 2024    ALB 4.2 2024    ALKPHO 75 2024    BILT 0.2 (L) 2024    TP 6.8 2024    AST 16 2024    ALT 16 2024    PTT 75.9 (H) 2024    INR 1.09 2024    MG 1.8 2024    PHOS 3.2 2024       XR KNEE (1 OR 2 VIEWS), LEFT (CPT=73560)    Result Date: 2024  CONCLUSION:  1. No acute appearing fracture or dislocation.  No significant arthropathy.  Moderate bony de ossification on usual or patient's age.  Correlate clinically. 2. Diffuse  soft tissue swelling about the knee especially in the prepatellar region.  Correlate clinically.    Dictated by (CST): Adama Allred MD on 4/25/2024 at 2:29 PM     Finalized by (CST): Adama Allred MD on 4/25/2024 at 2:30 PM               Assessment & Plan:       Acute left lower extremity DVT  -Patient presenting with left lower extremity pain  -Patient initially with mottling and discoloration of left lower extremity  -US venous Doppler of left leg noting extensive DVT from left external iliac vein to left upper calf veins.  -Continuing on heparin GTT  -IR consulted, recommend proceeding with venogram and thrombolysis.  Plan for 4/23.  Appreciate further recommendations  -CT chest reviewed.  Noted no signs of acute PE.  -Patient with reported history of IV contrast allergy.  Previous completed premedications prior to CT chest.  Plan for additional dosing prior to venogram and thrombolysis as above.    -Pain control as needed  -Continue to monitor     History of seizure disorder  -Continue home antiepileptics     Other medical problems include   Koolen-Agustina syndrome   Asthma   GERD  tethered cord syndrome   neurogenic bladder  Kyphoscoliosis   bilateral hip dysplasia status post bilateral varus derotation osteotomys  diaphragmatic hernia  mast cell activation syndrome      Plan of care discussed with patient and family at bedside and with Rn. Discussed management/test result(s) with IR consultant     Global A/P  -unable to do thrombectomy - due to anatomy    -transitioned to Lovenox subcutaneous therapeutic dose  -will need to have Lovenox subcutaneous.     - CT Venogram of Lower extremity - pre-medicate - done   - appreciate IR recs - plan for another attempt on monday  -Reviewed previous consultant notes  -Reviewed CBC, BMP, Mag, and Phos  -Reviewed tests ordered  -Repeat labs in am  -MDM: High, severe exacerbation of chronic illness posing a threat to life. IV medications requiring close inpatient  monitoring.   -PT/OT -         Anthony Short MD                   Is This A New Presentation, Or A Follow-Up?: Skin Lesions How Severe Is Your Skin Lesion?: mild Have Your Skin Lesions Been Treated?: not been treated

## 2024-04-27 NOTE — PLAN OF CARE
Problem: Patient Centered Care  Goal: Patient preferences are identified and integrated in the patient's plan of care  Description: Interventions:  - What would you like us to know as we care for you? Home with family   - Provide timely, complete, and accurate information to patient/family  - Incorporate patient and family knowledge, values, beliefs, and cultural backgrounds into the planning and delivery of care  - Encourage patient/family to participate in care and decision-making at the level they choose  - Honor patient and family perspectives and choices  Outcome: Progressing     Problem: Patient/Family Goals  Goal: Patient/Family Long Term Goal  Description: Patient's Long Term Goal: resolve DVT     Interventions:  - Monitor vitals  - Monitor appropriate labs  - Administer medications as ordered  - Follow MD's orders  - Update patient on plan of care   - Discharge planning     - See additional Care Plan goals for specific interventions  Outcome: Progressing  Goal: Patient/Family Short Term Goal  Description: Patient's Short Term Goal: Transfer to [facility]     Interventions:   - Social work consult   - Follow doctor's orders   - See additional Care Plan goals for specific interventions  Outcome: Progressing     Problem: SKIN/TISSUE INTEGRITY - ADULT  Goal: Skin integrity remains intact  Description: INTERVENTIONS  - Assess and document risk factors for pressure ulcer development  - Assess and document skin integrity  - Monitor for areas of redness and/or skin breakdown  - Initiate interventions, skin care algorithm/standards of care as needed  Outcome: Progressing     Problem: HEMATOLOGIC - ADULT  Goal: Maintains hematologic stability  Description: INTERVENTIONS  - Assess for signs and symptoms of bleeding or hemorrhage  - Monitor labs and vital signs for trends  - Administer supportive blood products/factors, fluids and medications as ordered and appropriate  - Administer supportive blood products/factors as  ordered and appropriate  Outcome: Progressing  Goal: Free from bleeding injury  Description: (Example usage: patient with low platelets)  INTERVENTIONS:  - Avoid intramuscular injections, enemas and rectal medication administration  - Ensure safe mobilization of patient  - Hold pressure on venipuncture sites to achieve adequate hemostasis  - Assess for signs and symptoms of internal bleeding  - Monitor lab trends  - Patient is to report abnormal signs of bleeding to staff  - Avoid use of toothpicks and dental floss  - Use electric shaver for shaving  - Use soft bristle tooth brush  - Limit straining and forceful nose blowing  Outcome: Progressing     Problem: PAIN - ADULT  Goal: Verbalizes/displays adequate comfort level or patient's stated pain goal  Description: INTERVENTIONS:  - Encourage pt to monitor pain and request assistance  - Assess pain using appropriate pain scale  - Administer analgesics based on type and severity of pain and evaluate response  - Implement non-pharmacological measures as appropriate and evaluate response  - Consider cultural and social influences on pain and pain management  - Manage/alleviate anxiety  - Utilize distraction and/or relaxation techniques  - Monitor for opioid side effects  - Notify MD/LIP if interventions unsuccessful or patient reports new pain  - Anticipate increased pain with activity and pre-medicate as appropriate  Outcome: Progressing     Problem: RISK FOR INFECTION - ADULT  Goal: Absence of fever/infection during anticipated neutropenic period  Description: INTERVENTIONS  - Monitor WBC  - Administer growth factors as ordered  - Implement neutropenic guidelines  Outcome: Progressing     Problem: SAFETY ADULT - FALL  Goal: Free from fall injury  Description: INTERVENTIONS:  - Assess pt frequently for physical needs  - Identify cognitive and physical deficits and behaviors that affect risk of falls.  - [place] fall precautions as indicated by assessment.  - Educate  pt/family on patient safety including physical limitations  - Instruct pt to call for assistance with activity based on assessment  - Modify environment to reduce risk of injury  - Provide assistive devices as appropriate  - Consider OT/PT consult to assist with strengthening/mobility  - Encourage toileting schedule  Outcome: Progressing     Problem: DISCHARGE PLANNING  Goal: Discharge to home or other facility with appropriate resources  Description: INTERVENTIONS:  - Identify barriers to discharge w/pt and caregiver  - Include patient/family/discharge partner in discharge planning  - Arrange for needed discharge resources and transportation as appropriate  - Identify discharge learning needs (meds, wound care, etc)  - Arrange for interpreters to assist at discharge as needed  - Consider post-discharge preferences of patient/family/discharge partner  - Complete POLST form as appropriate  - Assess patient's ability to be responsible for managing their own health  - Refer to Case Management Department for coordinating discharge planning if the patient needs post-hospital services based on physician/LIP order or complex needs related to functional status, cognitive ability or social support system  Outcome: Progressing    Family at bedside. Elevated heart rate, see MD orders. No complaints of pain today. Calls appropriately.

## 2024-04-27 NOTE — PHYSICAL THERAPY NOTE
PHYSICAL THERAPY TREATMENT NOTE - INPATIENT     Room Number: 526/526-A       Presenting Problem: acute deep vein thrombosis of iliac vein of left lower extremity       Problem List  Principal Problem:    Acute deep vein thrombosis (DVT) of iliac vein of left lower extremity (HCC)      PHYSICAL THERAPY ASSESSMENT   Patient demonstrates fair progress this session, goals  remain in progress.    Patient continues to function below baseline with bed mobility, transfers, gait, and stair negotiation.  Contributing factors to remaining limitations include decreased functional strength, pain, and impaired motor planning.  Next session anticipate patient to progress transfers, gait, and standing prolonged periods.  Physical Therapy will continue to follow patient for duration of hospitalization.    Patient continues to benefit from continued skilled PT services: Will return to home with 24 hour family support    PLAN  PT Treatment Plan: Bed mobility;Endurance;Energy conservation;Patient education;Gait training  Frequency (Obs): 5x/week    SUBJECTIVE  My tummy hurts    OBJECTIVE  Precautions:  (cognitive deficits)    WEIGHT BEARING RESTRICTION                PAIN ASSESSMENT   Rating: Unable to rate  Location: abdom  Management Techniques: Activity promotion    BALANCE  Static Sitting: Fair +  Dynamic Sitting: Fair -  Static Standing: Not tested  Dynamic Standing: Not tested    ACTIVITY TOLERANCE  Pulse: 101                       O2 WALK  Oxygen Therapy  SPO2% on Room Air at Rest: 100    AM-PAC '6-Clicks' INPATIENT SHORT FORM - BASIC MOBILITY  How much difficulty does the patient currently have...  Patient Difficulty: Turning over in bed (including adjusting bedclothes, sheets and blankets)?: A Lot   Patient Difficulty: Sitting down on and standing up from a chair with arms (e.g., wheelchair, bedside commode, etc.): Unable   Patient Difficulty: Moving from lying on back to sitting on the side of the bed?: A Lot   How much help  from another person does the patient currently need...   Help from Another: Moving to and from a bed to a chair (including a wheelchair)?: Total   Help from Another: Need to walk in hospital room?: Total   Help from Another: Climbing 3-5 steps with a railing?: Total     AM-PAC Score:  Raw Score: 8   Approx Degree of Impairment: 86.62%   Standardized Score (AM-PAC Scale): 28.58   CMS Modifier (G-Code): CM    FUNCTIONAL ABILITY STATUS  Functional Mobility/Gait Assessment  Gait Assistance: Not tested (pt refused)  Rolling: moderate assist  Supine to Sit: dependent  Sit to Supine: moderate assist  Sit to Stand: dependent    Additional information: able to sit at EOB 10 min with no HA, leg pain.  After 10 min, c/o abdom pain and retunrned to supine.  Discussed importance of continued mobility due to near one week in bed.    The patient's Approx Degree of Impairment: 86.62% has been calculated based on documentation in the [facility] '6 clicks' Inpatient Daily Activity Short Form.  Research supports that patients with this level of impairment may benefit from return to home with 24 hour support..  Final disposition will be made by interdisciplinary medical team.    THERAPEUTIC EXERCISES  Lower Extremity Ankle pumps  Glut sets  Heel raises  Knee extension  LAQ  Toe raises     Position Sitting and Supine       Patient End of Session: In bed;Needs met;Call light within reach;RN aware of session/findings;All patient questions and concerns addressed;Alarm set;Family present      CURRENT GOALS  Goals to be met by: 5/3/24  Patient Goal Patient's self-stated goal is: go home   Goal #1 Patient is able to demonstrate supine - sit EOB @ level: modified independent     Goal #1   Current Status Mod A   Goal #2 Patient is able to demonstrate transfers Sit to/from Stand at assistance level: modified independent with none or least restrictive assistive device      Goal #2  Current Status NT   Goal #3 Patient is able to ambulate 150 feet with  assist device: none or least restrictive assistive device at assistance level: supervision   Goal #3   Current Status NT   Goal #4 Patient will negotiate 2 stairs/one curb w/ assistive device and supervision   Goal #4   Current Status NT   Goal #5 Patient to demonstrate independence with home activity/exercise instructions provided to patient in preparation for discharge.   Goal #5   Current Status ongoing   Goal #6    Goal #6  Current Status            Gait Trainin minutes  Therapeutic Activity: 30 minutes  Neuromuscular Re-education: 0 minutes  Therapeutic Exercise: 10 minutes  Canalith Repositionin minutes  Manual Therapy: 0 minutes  Can add/delete any of these

## 2024-04-28 ENCOUNTER — APPOINTMENT (OUTPATIENT)
Dept: CV DIAGNOSTICS | Facility: HOSPITAL | Age: 19
End: 2024-04-28
Attending: HOSPITALIST
Payer: COMMERCIAL

## 2024-04-28 LAB
BASOPHILS # BLD AUTO: 0.02 X10(3) UL (ref 0–0.2)
BASOPHILS NFR BLD AUTO: 0.3 %
DEPRECATED RDW RBC AUTO: 42 FL (ref 35.1–46.3)
EOSINOPHIL # BLD AUTO: 0.17 X10(3) UL (ref 0–0.7)
EOSINOPHIL NFR BLD AUTO: 2.6 %
ERYTHROCYTE [DISTWIDTH] IN BLOOD BY AUTOMATED COUNT: 14.8 % (ref 11–15)
HCT VFR BLD AUTO: 27.7 %
HGB BLD-MCNC: 8.8 G/DL
IMM GRANULOCYTES # BLD AUTO: 0.04 X10(3) UL (ref 0–1)
IMM GRANULOCYTES NFR BLD: 0.6 %
LYMPHOCYTES # BLD AUTO: 1.33 X10(3) UL (ref 1.5–5)
LYMPHOCYTES NFR BLD AUTO: 20 %
MCH RBC QN AUTO: 24.9 PG (ref 26–34)
MCHC RBC AUTO-ENTMCNC: 31.8 G/DL (ref 31–37)
MCV RBC AUTO: 78.2 FL
MONOCYTES # BLD AUTO: 1.18 X10(3) UL (ref 0.1–1)
MONOCYTES NFR BLD AUTO: 17.7 %
NEUTROPHILS # BLD AUTO: 3.92 X10 (3) UL (ref 1.5–7.7)
NEUTROPHILS # BLD AUTO: 3.92 X10(3) UL (ref 1.5–7.7)
NEUTROPHILS NFR BLD AUTO: 58.8 %
PLATELET # BLD AUTO: 293 10(3)UL (ref 150–450)
RBC # BLD AUTO: 3.54 X10(6)UL
WBC # BLD AUTO: 6.7 X10(3) UL (ref 4–11)

## 2024-04-28 PROCEDURE — 99233 SBSQ HOSP IP/OBS HIGH 50: CPT | Performed by: HOSPITALIST

## 2024-04-28 PROCEDURE — 93306 TTE W/DOPPLER COMPLETE: CPT | Performed by: HOSPITALIST

## 2024-04-28 RX ORDER — SODIUM CHLORIDE 9 MG/ML
INJECTION, SOLUTION INTRAVENOUS CONTINUOUS
Status: ACTIVE | OUTPATIENT
Start: 2024-04-28 | End: 2024-04-29

## 2024-04-28 NOTE — PROGRESS NOTES
[facility]  part of [facility]    Progress Note    Nicky Nam Patient Status:  Inpatient    2005 MRN [id number]   Location [facility] 5SW/SE Attending Anthony Short MD   Hosp Day # 7 PCP Tu Yeung MD     Chief Complaint:     Acute DVT     Subjective:   Subjective:    Patient seen and examined this morning  Afebrile, normotensive  Episode of tachycardia - persistent  Better spirits today, states leg feels slightly better  Mother at bedside    Objective:   Blood pressure 118/77, pulse 99, temperature 99.1 °F (37.3 °C), temperature source Temporal, resp. rate 16, height 4' 11\" (1.499 m), weight 94 lb 6.4 oz (42.8 kg), last menstrual period 2024, SpO2 100%.  Physical Exam    General: Patient is alert and oriented  not appear to be in acute distress at this time, development delay  HEENT: EOMI PERRLA, atraumatic normocephalic  Cardiac: S1-S2 appreciated  Lungs: Good air entry bilaterally clear to auscultation  Abdomen: Soft nontender nondistended positive bowel sounds  Ext: Peripheral pulses are positive, LLE edema  Neuro: No focal deficits noted      Results:   Lab Results   Component Value Date    WBC 6.7 2024    HGB 8.8 (L) 2024    HCT 27.7 (L) 2024    .0 2024    CREATSERUM 0.65 2024    BUN 13 2024     2024    K 3.9 2024     2024    CO2 27.0 2024     (H) 2024    CA 8.8 2024    ALB 4.2 2024    ALKPHO 75 2024    BILT 0.2 (L) 2024    TP 6.8 2024    AST 16 2024    ALT 16 2024    PTT 75.9 (H) 2024    INR 1.09 2024    MG 1.8 2024    PHOS 3.2 2024       No results found.        Assessment & Plan:       Acute left lower extremity DVT  -Patient presenting with left lower extremity pain  -Patient initially with mottling and discoloration of left lower extremity  -US venous Doppler of left leg noting  extensive DVT from left external iliac vein to left upper calf veins.  -Continuing on heparin GTT  -IR consulted, recommend proceeding with venogram and thrombolysis.  Plan for 4/23.  Appreciate further recommendations  -CT chest reviewed.  Noted no signs of acute PE.  -Patient with reported history of IV contrast allergy.  Previous completed premedications prior to CT chest.  Plan for additional dosing prior to venogram and thrombolysis as above.    -Pain control as needed  -Continue to monitor     History of seizure disorder  -Continue home antiepileptics     Other medical problems include   Koolen-Agustina syndrome   Asthma   GERD  tethered cord syndrome   neurogenic bladder  Kyphoscoliosis   bilateral hip dysplasia status post bilateral varus derotation osteotomys  diaphragmatic hernia  mast cell activation syndrome      Plan of care discussed with patient and family at bedside and with Rn. Discussed management/test result(s) with IR consultant     Global A/P  -Persistent tachycardia -ordered echocardiogram awaiting results  -Cardiology placed on consult will appreciate recs  -Will await further recommendations from IR  -unable to do thrombectomy - due to anatomy    -transitioned to Lovenox subcutaneous therapeutic dose  -will need to have Lovenox subcutaneous.     - CT Venogram of Lower extremity - pre-medicate - done   - appreciate IR recs - plan for another attempt on Monday  -Reviewed previous consultant notes  -Reviewed CBC, BMP, Mag, and Phos  -Reviewed tests ordered  -Repeat labs in am  -MDM: High, severe exacerbation of chronic illness posing a threat to life. IV medications requiring close inpatient monitoring.   -PT/OT -         Anthony Short MD

## 2024-04-28 NOTE — PHYSICAL THERAPY NOTE
PHYSICAL THERAPY TREATMENT NOTE - INPATIENT     Room Number: 526/526-A       Presenting Problem: acute deep vein thrombosis of iliac vein of left lower extremity       Problem List  Principal Problem:    Acute deep vein thrombosis (DVT) of iliac vein of left lower extremity (HCC)      PHYSICAL THERAPY ASSESSMENT   Patient demonstrates good  progress this session, goals  remain in progress.    Patient continues to function below baseline with transfers, gait, and maintaining seated position.  Contributing factors to remaining limitations include decreased functional strength, decreased endurance/aerobic capacity, pain, impaired coordination, decreased muscular endurance, cognitive deficits (impaired), and medical status.  Next session anticipate patient to progress bed mobility, transfers, and maintaining seated position.  Physical Therapy will continue to follow patient for duration of hospitalization.    Patient continues to benefit from continued skilled PT services: For duration of hospitalization, however, given the patient is functioning near baseline level do not anticipate skilled therapy needs at discharge .    PLAN  PT Treatment Plan: Endurance;Gait training;Transfer training;Balance training;Patient education;Family education;Body mechanics  Frequency (Obs): 5x/week    SUBJECTIVE  My HR is high today.    OBJECTIVE  Precautions:  (cognitive deficits)    WEIGHT BEARING RESTRICTION                PAIN ASSESSMENT   Rating: Unable to rate  Location: left leg  Management Techniques: Body mechanics;Repositioning;Breathing techniques    BALANCE  Static Sitting: Good  Dynamic Sitting: Fair +  Static Standing: Not tested  Dynamic Standing: Not tested    ACTIVITY TOLERANCE  Pulse: 106  Heart Rate Source: Monitor  Resp: 16  BP: 119/75  BP Location: Right arm  BP Method: Automatic  Patient Position: Sitting     O2 WALK  Oxygen Therapy  SPO2% on Room Air at Rest: 100    AM-PAC '6-Clicks' INPATIENT SHORT FORM - BASIC  MOBILITY  How much difficulty does the patient currently have...  Patient Difficulty: Turning over in bed (including adjusting bedclothes, sheets and blankets)?: A Little   Patient Difficulty: Sitting down on and standing up from a chair with arms (e.g., wheelchair, bedside commode, etc.): Unable   Patient Difficulty: Moving from lying on back to sitting on the side of the bed?: A Little   How much help from another person does the patient currently need...   Help from Another: Moving to and from a bed to a chair (including a wheelchair)?: Total   Help from Another: Need to walk in hospital room?: Total   Help from Another: Climbing 3-5 steps with a railing?: Total     AM-PAC Score:  Raw Score: 10   Approx Degree of Impairment: 76.75%   Standardized Score (AM-PAC Scale): 32.29   CMS Modifier (G-Code): CL    FUNCTIONAL ABILITY STATUS  Functional Mobility/Gait Assessment  Gait Assistance: Not tested  Rolling: minimal assist  Supine to Sit: minimal assist  Sit to Supine: minimal assist  Sit to Stand:  NT    Additional information: patient in bed comfortable and very cooperative. Family in room. Patient c/o LLE pain and is swollen noted. She req Min A for bed mobility supine <> sit at EOB in slow pace. Patient sit ~ 7 min  at EOB herself. Instructed on B LE's therapeutic exer in all available planes with cues for correct form LLE limited with ROM and weakness noted. HR inc 106-120 with no symptoms during session. RN informed. Doc cardiologist visited patient.     The patient's Approx Degree of Impairment: 76.75% has been calculated based [facility] '6 clicks' Inpatient Daily Activity Short Form.  Research supports that patients with this level of impairment may benefit from home.  Final disposition will be made by interdisciplinary medical team.    THERAPEUTIC EXERCISES  Lower Extremity Ankle pumps  Heel slides  Knee extension  Leg slides     Position Sitting and Supine       Patient End of Session:  In bed;All patient questions and concerns addressed;RN aware of session/findings;Call light within reach;Needs met;Family present    CURRENT GOALS   Goals to be met by: 5/3/24  Patient Goal Patient's self-stated goal is: go home   Goal #1 Patient is able to demonstrate supine - sit EOB @ level: modified independent     Goal #1   Current Status Min  A   Goal #2 Patient is able to demonstrate transfers Sit to/from Stand at assistance level: modified independent with none or least restrictive assistive device      Goal #2  Current Status NT   Goal #3 Patient is able to ambulate 150 feet with assist device: none or least restrictive assistive device at assistance level: supervision   Goal #3   Current Status NT   Goal #4 Patient will negotiate 2 stairs/one curb w/ assistive device and supervision   Goal #4   Current Status NT   Goal #5 Patient to demonstrate independence with home activity/exercise instructions provided to patient in preparation for discharge.   Goal #5   Current Status ongoing   Goal #6    Goal #6  Current Status        Gait Training:  minutes  Therapeutic Activity: 20 minutes  Neuromuscular Re-education:  minutes  Therapeutic Exercise: 10 minutes  Canalith Repositioning:  minutes  Manual Therapy:  minutes  Can add/delete any of these

## 2024-04-28 NOTE — PLAN OF CARE
Problem: Patient Centered Care  Goal: Patient preferences are identified and integrated in the patient's plan of care  Description: Interventions:  - What would you like us to know as we care for you? Home with family   - Provide timely, complete, and accurate information to patient/family  - Incorporate patient and family knowledge, values, beliefs, and cultural backgrounds into the planning and delivery of care  - Encourage patient/family to participate in care and decision-making at the level they choose  - Honor patient and family perspectives and choices  Outcome: Progressing     Problem: Patient/Family Goals  Goal: Patient/Family Long Term Goal  Description: Patient's Long Term Goal: resolve DVT     Interventions:  - Monitor vitals  - Monitor appropriate labs  - Administer medications as ordered  - Follow MD's orders  - Update patient on plan of care   - Discharge planning     - See additional Care Plan goals for specific interventions  Outcome: Progressing  Goal: Patient/Family Short Term Goal  Description: Patient's Short Term Goal: Transfer to [facility]     Interventions:   - Social work consult   - Follow doctor's orders   - See additional Care Plan goals for specific interventions  Outcome: Progressing     Problem: SKIN/TISSUE INTEGRITY - ADULT  Goal: Skin integrity remains intact  Description: INTERVENTIONS  - Assess and document risk factors for pressure ulcer development  - Assess and document skin integrity  - Monitor for areas of redness and/or skin breakdown  - Initiate interventions, skin care algorithm/standards of care as needed  Outcome: Progressing     Problem: HEMATOLOGIC - ADULT  Goal: Maintains hematologic stability  Description: INTERVENTIONS  - Assess for signs and symptoms of bleeding or hemorrhage  - Monitor labs and vital signs for trends  - Administer supportive blood products/factors, fluids and medications as ordered and appropriate  - Administer supportive blood products/factors as  ordered and appropriate  Outcome: Progressing  Goal: Free from bleeding injury  Description: (Example usage: patient with low platelets)  INTERVENTIONS:  - Avoid intramuscular injections, enemas and rectal medication administration  - Ensure safe mobilization of patient  - Hold pressure on venipuncture sites to achieve adequate hemostasis  - Assess for signs and symptoms of internal bleeding  - Monitor lab trends  - Patient is to report abnormal signs of bleeding to staff  - Avoid use of toothpicks and dental floss  - Use electric shaver for shaving  - Use soft bristle tooth brush  - Limit straining and forceful nose blowing  Outcome: Progressing     Problem: PAIN - ADULT  Goal: Verbalizes/displays adequate comfort level or patient's stated pain goal  Description: INTERVENTIONS:  - Encourage pt to monitor pain and request assistance  - Assess pain using appropriate pain scale  - Administer analgesics based on type and severity of pain and evaluate response  - Implement non-pharmacological measures as appropriate and evaluate response  - Consider cultural and social influences on pain and pain management  - Manage/alleviate anxiety  - Utilize distraction and/or relaxation techniques  - Monitor for opioid side effects  - Notify MD/LIP if interventions unsuccessful or patient reports new pain  - Anticipate increased pain with activity and pre-medicate as appropriate  Outcome: Progressing     Problem: RISK FOR INFECTION - ADULT  Goal: Absence of fever/infection during anticipated neutropenic period  Description: INTERVENTIONS  - Monitor WBC  - Administer growth factors as ordered  - Implement neutropenic guidelines  Outcome: Progressing     Problem: SAFETY ADULT - FALL  Goal: Free from fall injury  Description: INTERVENTIONS:  - Assess pt frequently for physical needs  - Identify cognitive and physical deficits and behaviors that affect risk of falls.  - [place] fall precautions as indicated by assessment.  - Educate  pt/family on patient safety including physical limitations  - Instruct pt to call for assistance with activity based on assessment  - Modify environment to reduce risk of injury  - Provide assistive devices as appropriate  - Consider OT/PT consult to assist with strengthening/mobility  - Encourage toileting schedule  Outcome: Progressing     Problem: DISCHARGE PLANNING  Goal: Discharge to home or other facility with appropriate resources  Description: INTERVENTIONS:  - Identify barriers to discharge w/pt and caregiver  - Include patient/family/discharge partner in discharge planning  - Arrange for needed discharge resources and transportation as appropriate  - Identify discharge learning needs (meds, wound care, etc)  - Arrange for interpreters to assist at discharge as needed  - Consider post-discharge preferences of patient/family/discharge partner  - Complete POLST form as appropriate  - Assess patient's ability to be responsible for managing their own health  - Refer to Case Management Department for coordinating discharge planning if the patient needs post-hospital services based on physician/LIP order or complex needs related to functional status, cognitive ability or social support system  Outcome: Progressing     Assumed care at 23:00. No acute changes noted at this time. Safety and fall precautions maintained- bed alarm on, bed locked in lowest position, call light within reach. Patients mom at bedside

## 2024-04-28 NOTE — CONSULTS
[facility]  part of [facility]    Report of Consultation    Nicky Nam Patient Status:  Inpatient    2005 MRN [id number]   Location [facility] 5SW/SE Attending Anthony Short MD   Hosp Day # 7 PCP Tu Yeung MD     Date of Admission:  2024  Date of Consult:  24  Reason for Consultation:   S tachycardia      History of Present Illness:   Patient is a 18 year old female who was admitted to the hospital for Acute deep vein thrombosis (DVT) of iliac vein of left lower extremity (HCC):  Patent has S tachycardia 110-120 with /75  No CP/SOB at rest/palpitations  Left leg pains better  Patient was admitted with mottling and discoloration of LLE and V USG s/o Left sided extensive DVT from Left external Iliac vein to Left upper calf veins   IR consulted rec: Venogram and Thrombectomy, currently on Lovenox @ Therapeutic dose  Chest CTA no PE      Past Medical History  Past Medical History:    Celiac disease (HCC)    Epilepsy (HCC)    Hearing loss    Hernia, diaphragmatic, congenital (HCC)    Hip dysplasia (HCC)    Mast cell activation (HCC)    Seizure disorder (HCC)    Urinary reflux    Vesicoureteral reflux       Past Surgical History  Past Surgical History:   Procedure Laterality Date    Other surgical history      diaphram hernia repair and uretral repair        Family History  No family history on file.    Social History  Social History     Socioeconomic History    Marital status: Single   Tobacco Use    Smoking status: Never    Smokeless tobacco: Never    Tobacco comments:     No household smokers.      Social Determinants of Health     Financial Resource Strain: Low Risk  (2022)    [facility], [facility]    Overall Financial Resource Strain (CARDIA)     Difficulty of Paying Living Expenses: Not hard at all    Received from [facility], [facility]  Insecurity   Transportation Needs: No Transportation Needs (4/21/2024)    Transportation Needs     Lack of Transportation: No   Stress: No Stress Concern Present (6/7/2022)    Received from [facility], [facility]    [employer] - Occupational Stress Questionnaire     Feeling of Stress : Only a little    Received from [facility], [facility]    Social Connections   Housing Stability: Low Risk  (4/21/2024)    Housing Stability     Housing Instability: No           Current Medications:  Current Facility-Administered Medications   Medication Dose Route Frequency    metoprolol tartrate (Lopressor) partial tab 12.5 mg  12.5 mg Oral 2x Daily(Beta Blocker)    sodium chloride tab 1 g  1 g Oral TID CC    enoxaparin (Lovenox) 40 MG/0.4ML SUBQ injection 40 mg  1 mg/kg Subcutaneous 2 times per day    sennosides (Senokot) tab 8.6 mg  8.6 mg Oral Daily    ondansetron (Zofran) 4 MG/2ML injection 4 mg  4 mg Intravenous Q6H PRN    acetaminophen (Tylenol) rectal suppository 650 mg  650 mg Rectal Q6H PRN    carBAMazepine ER (TEGRETOL XR) 12 hr tab 300 mg  300 mg Oral Q12H    famotidine (Pepcid) tab 20 mg  20 mg Oral BID    traMADol (Ultram) tab 50 mg  50 mg Oral Q6H PRN    Allegra Allergy (Fexofenadine) 180mg 24 hr tablet - pt's own medication  180 mg Oral BID    cetirizine (ZyrTEC) tab 10 mg  10 mg Oral Daily    acetaminophen (Tylenol Extra Strength) tab 500 mg  500 mg Oral Q4H PRN     Medications Prior to Admission   Medication Sig    fexofenadine 180 MG Oral Tab Take 1 tablet (180 mg total) by mouth 2 (two) times daily. Mast cell    famotidine 20 MG Oral Tab Take 1 tablet (20 mg total) by mouth 2 (two) times daily.    sodium chloride 1 g Oral Tab Take 1 tablet (1 g total) by mouth 3 (three) times daily with meals.    cetirizine 10 MG Oral Tab Take 1 tablet (10 mg total) by mouth as needed for Allergies.    Cholecalciferol (VITAMIN D) 50  MCG (2000 UT) Oral Cap Take 1 capsule (2,000 Units total) by mouth daily.    NON FORMULARY Take 180 mg by mouth in the morning and 180 mg before bedtime. Allegra non-drowsy 180 mg BID .    CarBAMazepine  MG Oral Capsule SR 12 Hr     EPINEPHrine (EPIPEN 2-KARTHIK) 0.3 MG/0.3ML Injection Solution Auto-injector Inject IM in event of  allergic reaction. Dispense twin pack, ok to dispense generic Mylan if ok with parent    diazepam 10 MG Rectal Gel I 7.5 MG RECTALLY PRF SEIZURE    FLUVIRIN Intramuscular Suspension ADM 0.5ML IM UTD       Allergies  Allergies   Allergen Reactions    Radiology Contrast Iodinated Dyes ANAPHYLAXIS    Gluten Meal PAIN and FACE FLUSHING     Abdominal pain    Pineapple PAIN and FACE FLUSHING     Abdominal pain      Tomato PAIN and FACE FLUSHING     Abdominal pain     Augmentin [Amoxicillin-Pot Clavulanate] UNKNOWN     Serum sickness    Penicillins      serum sickness    Red Dye RASH       Review of Systems:   Pertinent items are noted in HPI.    Physical Exam:   Vital Signs:  Blood pressure 119/75, pulse 114, temperature 98.3 °F (36.8 °C), temperature source Oral, resp. rate 16, height 4' 11\" (1.499 m), weight 94 lb 6.4 oz (42.8 kg), last menstrual period 04/14/2024, SpO2 99%.     Physical Exam  Vitals and nursing note reviewed.   Constitutional:       Appearance: Normal appearance.   HENT:      Head: Normocephalic and atraumatic.      Nose: Nose normal.   Cardiovascular:      Rate and Rhythm: Regular rhythm. Tachycardia present.      Pulses: Normal pulses.      Heart sounds: Normal heart sounds.   Pulmonary:      Effort: Pulmonary effort is normal.      Breath sounds: Normal breath sounds.   Abdominal:      General: Bowel sounds are normal.      Palpations: Abdomen is soft.   Musculoskeletal:      Cervical back: Neck supple.      Left lower leg: Edema present.   Neurological:      General: No focal deficit present.      Mental Status: She is alert and oriented to person, place, and time.         Results:     Laboratory Data:  Lab Results   Component Value Date    WBC 6.7 04/28/2024    HGB 8.8 (L) 04/28/2024    HCT 27.7 (L) 04/28/2024    .0 04/28/2024    CREATSERUM 0.65 04/26/2024    BUN 13 04/26/2024     04/26/2024    K 3.9 04/26/2024     04/26/2024    CO2 27.0 04/26/2024     (H) 04/26/2024    CA 8.8 04/26/2024    ALB 4.2 04/22/2024    ALKPHO 75 04/22/2024    TP 6.8 04/22/2024    AST 16 04/22/2024    ALT 16 04/22/2024    PTT 75.9 (H) 04/25/2024    INR 1.09 04/21/2024    PTP 14.8 04/21/2024    MG 1.8 04/27/2024    PHOS 3.2 04/27/2024         Imaging:  IR procedure:   Bilateral lower extremity venography demonstrating patent right popliteal through common iliac veins, thrombosed left popliteal through common iliac veins, no communication with the inferior vena cava from the iliac veins.  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  Due to the patient's anatomy and the inability to obtain adequate venous outflow, the decision was made not to perform thrombectomy this time.     Plan: Continue anticoagulation.  IR will see the patient in clinic in 2 weeks.  If patient has persistent left leg swelling, will discuss iliocaval reconstruction.    CT venography:  CONCLUSION:      Extensive DVT within the left common, internal and external iliac veins.      DVT also seen within the left common and superficial femoral veins.      Patent left-sided IVC.      Hypertrophied bilateral paraspinal veins with thrombus in the left paraspinal veins.      Extensive venous engorgement with soft tissue and intramuscular edema within the left proximal thigh.       Impression:     Acute deep vein thrombosis (DVT) of iliac vein of left lower extremity (HCC)  On Lovenox @ Therapeutic dose  IR follows for possible thrombectomy  Patient may need Ilio-caval reconstruction    Sinus  tachycardia: likely due to anxiety  Anemia : Hb 8.8  Anxiety   H/O Seizure disorder      Recommendations:  Metoprolol 12.5 mg q 12 to be continued  IV fluids  ECHO pending  Continue Lovenox @ therapeutic dose    D/W Patient/mother by the bedside  D/W Nursing staff      Thank you for allowing me to participate in the care of your patient.    René Montelongo MD [facility]  [address]  [place] [address]  [phone]    This note was prepared using Dragon Medical voice recognition dictation software. As a result errors may occur. When identified these errors have been corrected. While every attempt is made to correct errors during dictation discrepancies may still exist.

## 2024-04-28 NOTE — PLAN OF CARE
Problem: Patient Centered Care  Goal: Patient preferences are identified and integrated in the patient's plan of care  Description: Interventions:  - What would you like us to know as we care for you? Home with family   - Provide timely, complete, and accurate information to patient/family  - Incorporate patient and family knowledge, values, beliefs, and cultural backgrounds into the planning and delivery of care  - Encourage patient/family to participate in care and decision-making at the level they choose  - Honor patient and family perspectives and choices  Outcome: Progressing     Problem: Patient/Family Goals  Goal: Patient/Family Long Term Goal  Description: Patient's Long Term Goal: resolve DVT     Interventions:  - Monitor vitals  - Monitor appropriate labs  - Administer medications as ordered  - Follow MD's orders  - Update patient on plan of care   - Discharge planning     - See additional Care Plan goals for specific interventions  Outcome: Progressing  Goal: Patient/Family Short Term Goal  Description: Patient's Short Term Goal: Transfer to [facility]     Interventions:   - Social work consult   - Follow doctor's orders   - See additional Care Plan goals for specific interventions  Outcome: Progressing     Problem: SKIN/TISSUE INTEGRITY - ADULT  Goal: Skin integrity remains intact  Description: INTERVENTIONS  - Assess and document risk factors for pressure ulcer development  - Assess and document skin integrity  - Monitor for areas of redness and/or skin breakdown  - Initiate interventions, skin care algorithm/standards of care as needed  Outcome: Progressing     Problem: HEMATOLOGIC - ADULT  Goal: Maintains hematologic stability  Description: INTERVENTIONS  - Assess for signs and symptoms of bleeding or hemorrhage  - Monitor labs and vital signs for trends  - Administer supportive blood products/factors, fluids and medications as ordered and appropriate  - Administer supportive blood products/factors as  ordered and appropriate  Outcome: Progressing  Goal: Free from bleeding injury  Description: (Example usage: patient with low platelets)  INTERVENTIONS:  - Avoid intramuscular injections, enemas and rectal medication administration  - Ensure safe mobilization of patient  - Hold pressure on venipuncture sites to achieve adequate hemostasis  - Assess for signs and symptoms of internal bleeding  - Monitor lab trends  - Patient is to report abnormal signs of bleeding to staff  - Avoid use of toothpicks and dental floss  - Use electric shaver for shaving  - Use soft bristle tooth brush  - Limit straining and forceful nose blowing  Outcome: Progressing     Problem: PAIN - ADULT  Goal: Verbalizes/displays adequate comfort level or patient's stated pain goal  Description: INTERVENTIONS:  - Encourage pt to monitor pain and request assistance  - Assess pain using appropriate pain scale  - Administer analgesics based on type and severity of pain and evaluate response  - Implement non-pharmacological measures as appropriate and evaluate response  - Consider cultural and social influences on pain and pain management  - Manage/alleviate anxiety  - Utilize distraction and/or relaxation techniques  - Monitor for opioid side effects  - Notify MD/LIP if interventions unsuccessful or patient reports new pain  - Anticipate increased pain with activity and pre-medicate as appropriate  Outcome: Progressing     Problem: RISK FOR INFECTION - ADULT  Goal: Absence of fever/infection during anticipated neutropenic period  Description: INTERVENTIONS  - Monitor WBC  - Administer growth factors as ordered  - Implement neutropenic guidelines  Outcome: Progressing     Problem: SAFETY ADULT - FALL  Goal: Free from fall injury  Description: INTERVENTIONS:  - Assess pt frequently for physical needs  - Identify cognitive and physical deficits and behaviors that affect risk of falls.  - [place] fall precautions as indicated by assessment.  - Educate  pt/family on patient safety including physical limitations  - Instruct pt to call for assistance with activity based on assessment  - Modify environment to reduce risk of injury  - Provide assistive devices as appropriate  - Consider OT/PT consult to assist with strengthening/mobility  - Encourage toileting schedule  Outcome: Progressing     Problem: DISCHARGE PLANNING  Goal: Discharge to home or other facility with appropriate resources  Description: INTERVENTIONS:  - Identify barriers to discharge w/pt and caregiver  - Include patient/family/discharge partner in discharge planning  - Arrange for needed discharge resources and transportation as appropriate  - Identify discharge learning needs (meds, wound care, etc)  - Arrange for interpreters to assist at discharge as needed  - Consider post-discharge preferences of patient/family/discharge partner  - Complete POLST form as appropriate  - Assess patient's ability to be responsible for managing their own health  - Refer to Case Management Department for coordinating discharge planning if the patient needs post-hospital services based on physician/LIP order or complex needs related to functional status, cognitive ability or social support system  Outcome: Progressing  On remote tele, 2d echo done today, pt to work with therapy today, lovenox injection administered by pt's mother today under this RN supervision in preparation for home injections, family at bedside throughout shift, pt and family able to make staff aware of any needs.

## 2024-04-29 LAB
ANION GAP SERPL CALC-SCNC: 5 MMOL/L (ref 0–18)
ANION GAP SERPL CALC-SCNC: 5 MMOL/L (ref 0–18)
BASOPHILS # BLD AUTO: 0.02 X10(3) UL (ref 0–0.2)
BASOPHILS NFR BLD AUTO: 0.4 %
BUN BLD-MCNC: 10 MG/DL (ref 9–23)
BUN BLD-MCNC: 12 MG/DL (ref 9–23)
BUN/CREAT SERPL: 19.7 (ref 10–20)
BUN/CREAT SERPL: 20.4 (ref 10–20)
CALCIUM BLD-MCNC: 8.6 MG/DL (ref 8.7–10.4)
CALCIUM BLD-MCNC: 8.7 MG/DL (ref 8.7–10.4)
CHLORIDE SERPL-SCNC: 103 MMOL/L (ref 98–112)
CHLORIDE SERPL-SCNC: 107 MMOL/L (ref 98–112)
CO2 SERPL-SCNC: 26 MMOL/L (ref 21–32)
CO2 SERPL-SCNC: 28 MMOL/L (ref 21–32)
CREAT BLD-MCNC: 0.49 MG/DL
CREAT BLD-MCNC: 0.61 MG/DL
DEPRECATED RDW RBC AUTO: 41.5 FL (ref 35.1–46.3)
EGFRCR SERPLBLD CKD-EPI 2021: 132 ML/MIN/1.73M2 (ref 60–?)
EGFRCR SERPLBLD CKD-EPI 2021: 139 ML/MIN/1.73M2 (ref 60–?)
EOSINOPHIL # BLD AUTO: 0.18 X10(3) UL (ref 0–0.7)
EOSINOPHIL NFR BLD AUTO: 3.2 %
ERYTHROCYTE [DISTWIDTH] IN BLOOD BY AUTOMATED COUNT: 14.8 % (ref 11–15)
GLUCOSE BLD-MCNC: 101 MG/DL (ref 70–99)
GLUCOSE BLD-MCNC: 94 MG/DL (ref 70–99)
HCT VFR BLD AUTO: 26 %
HGB BLD-MCNC: 8.3 G/DL
IMM GRANULOCYTES # BLD AUTO: 0.05 X10(3) UL (ref 0–1)
IMM GRANULOCYTES NFR BLD: 0.9 %
LYMPHOCYTES # BLD AUTO: 1.33 X10(3) UL (ref 1.5–5)
LYMPHOCYTES NFR BLD AUTO: 23.6 %
MAGNESIUM SERPL-MCNC: 2.1 MG/DL (ref 1.6–2.6)
MCH RBC QN AUTO: 24.6 PG (ref 26–34)
MCHC RBC AUTO-ENTMCNC: 31.9 G/DL (ref 31–37)
MCV RBC AUTO: 76.9 FL
MONOCYTES # BLD AUTO: 0.97 X10(3) UL (ref 0.1–1)
MONOCYTES NFR BLD AUTO: 17.2 %
NEUTROPHILS # BLD AUTO: 3.08 X10 (3) UL (ref 1.5–7.7)
NEUTROPHILS # BLD AUTO: 3.08 X10(3) UL (ref 1.5–7.7)
NEUTROPHILS NFR BLD AUTO: 54.7 %
OSMOLALITY SERPL CALC.SUM OF ELEC: 282 MOSM/KG (ref 275–295)
OSMOLALITY SERPL CALC.SUM OF ELEC: 285 MOSM/KG (ref 275–295)
PLATELET # BLD AUTO: 292 10(3)UL (ref 150–450)
POTASSIUM SERPL-SCNC: 4.1 MMOL/L (ref 3.5–5.1)
POTASSIUM SERPL-SCNC: 4.2 MMOL/L (ref 3.5–5.1)
RBC # BLD AUTO: 3.38 X10(6)UL
SODIUM SERPL-SCNC: 136 MMOL/L (ref 136–145)
SODIUM SERPL-SCNC: 138 MMOL/L (ref 136–145)
WBC # BLD AUTO: 5.6 X10(3) UL (ref 4–11)

## 2024-04-29 PROCEDURE — 99233 SBSQ HOSP IP/OBS HIGH 50: CPT | Performed by: HOSPITALIST

## 2024-04-29 NOTE — PAYOR COMM NOTE
--------------  CONTINUED STAY REVIEW    Payor: TAMANNA HARTMAN  Subscriber #:  [id number]  Authorization Number: [id number]    Admit date: 4/21/24  Admit time:  3:34 PM    REVIEW DOCUMENTATION:  4/27  Hospitalist Progress Note   Assessment & Plan:  Acute left lower extremity DVT  -Patient presenting with left lower extremity pain  -Patient initially with mottling and discoloration of left lower extremity  -US venous Doppler of left leg noting extensive DVT from left external iliac vein to left upper calf veins.  -Continuing on heparin GTT  -IR consulted, recommend proceeding with venogram and thrombolysis.  Plan for 4/23.  Appreciate further recommendations  -CT chest reviewed.  Noted no signs of acute PE.  -Patient with reported history of IV contrast allergy.  Previous completed premedications prior to CT chest.  Plan for additional dosing prior to venogram and thrombolysis as above.    -Pain control as needed  -Continue to monitor     History of seizure disorder  -Continue home antiepileptics     Other medical problems include   Koolen-Agustina syndrome   Asthma   GERD  tethered cord syndrome   neurogenic bladder  Kyphoscoliosis   bilateral hip dysplasia status post bilateral varus derotation osteotomys  diaphragmatic hernia  mast cell activation syndrome      Plan of care discussed with patient and family at bedside and with Rn. Discussed management/test result(s) with IR consultant     Global A/P  -unable to do thrombectomy - due to anatomy     -transitioned to Lovenox subcutaneous therapeutic dose  -will need to have Lovenox subcutaneous.                   - CT Venogram of Lower extremity - pre-medicate - done                 - appreciate IR recs - plan for another attempt on monday  -Reviewed previous consultant notes  -Reviewed CBC, BMP, Mag, and Phos  -Reviewed tests ordered  -Repeat labs in am  -MDM: High, severe exacerbation of chronic illness posing a threat to life. IV medications requiring close inpatient  monitoring.   -PT/OT -      4/28  Hospitalist Progress Note   Assessment & Plan:  Acute left lower extremity DVT  -Patient presenting with left lower extremity pain  -Patient initially with mottling and discoloration of left lower extremity  -US venous Doppler of left leg noting extensive DVT from left external iliac vein to left upper calf veins.  -Continuing on heparin GTT  -IR consulted, recommend proceeding with venogram and thrombolysis.  Plan for 4/23.  Appreciate further recommendations  -CT chest reviewed.  Noted no signs of acute PE.  -Patient with reported history of IV contrast allergy.  Previous completed premedications prior to CT chest.  Plan for additional dosing prior to venogram and thrombolysis as above.    -Pain control as needed  -Continue to monitor     History of seizure disorder  -Continue home antiepileptics     Other medical problems include   Koolen-Agustina syndrome   Asthma   GERD  tethered cord syndrome   neurogenic bladder  Kyphoscoliosis   bilateral hip dysplasia status post bilateral varus derotation osteotomys  diaphragmatic hernia  mast cell activation syndrome      Plan of care discussed with patient and family at bedside and with Rn. Discussed management/test result(s) with IR consultant     Global A/P  -Persistent tachycardia -ordered echocardiogram awaiting results  -Cardiology placed on consult will appreciate recs  -Will await further recommendations from IR  -unable to do thrombectomy - due to anatomy     -transitioned to Lovenox subcutaneous therapeutic dose  -will need to have Lovenox subcutaneous.                   - CT Venogram of Lower extremity - pre-medicate - done                 - appreciate IR recs - plan for another attempt on Monday  -Reviewed previous consultant notes  -Reviewed CBC, BMP, Mag, and Phos  -Reviewed tests ordered  -Repeat labs in am  -MDM: High, severe exacerbation of chronic illness posing a threat to life. IV medications requiring close inpatient  monitoring.   -PT/OT -   Cardiology Report of Consultation   Impression:     Acute deep vein thrombosis (DVT) of iliac vein of left lower extremity (HCC)  On Lovenox @ Therapeutic dose  IR follows for possible thrombectomy  Patient may need Ilio-caval reconstruction     Sinus tachycardia: likely due to anxiety  Anemia : Hb 8.8  Anxiety   H/O Seizure disorder        Recommendations:  Metoprolol 12.5 mg q 12 to be continued  IV fluids  ECHO pending  Continue Lovenox @ therapeutic dose  4/29  Cardiology Hospital Progress Note   IMPRESSION:        RECOMMENDATIONS:  Acute deep vein thrombosis (DVT) of iliac vein of left lower extremity (HCC)  On Lovenox @ Therapeutic dose  IR follows for possible thrombectomy  Patient may need Ilio-caval reconstruction     Sinus tachycardia: likely due to anxiety  Anemia : Hb 8.8-->8.3  Anxiety   H/O Seizure disorder  Per Mother of patient , Mitral valve prolapse was diagnosed in the past, ECHO here s/o redundant MV without definite prolapse and without any mitral regurgitation, reassures the mother.        Remains on Metoprolol 12.5 mg q 12  On Lovenox @ therapeutic dose  Follow H&H closely  Rx of anxiety and pain per PCP  Await IR decision for further Rx-Ilio caval reconstruction?   Hospitalist Progress Note   Assessment & Plan:  Acute left lower extremity DVT  -Patient presenting with left lower extremity pain  -Patient initially with mottling and discoloration of left lower extremity  -US venous Doppler of left leg noting extensive DVT from left external iliac vein to left upper calf veins.  -Continuing on heparin GTT  -IR consulted, recommend proceeding with venogram and thrombolysis.  Plan for 4/23.  Appreciate further recommendations  -CT chest reviewed.  Noted no signs of acute PE.  -Patient with reported history of IV contrast allergy.  Previous completed premedications prior to CT chest.  Plan for additional dosing prior to venogram and thrombolysis as above.    -Pain control  as needed  -Continue to monitor     History of seizure disorder  -Continue home antiepileptics     Other medical problems include   Koolen-Agustina syndrome   Asthma   GERD  tethered cord syndrome   neurogenic bladder  Kyphoscoliosis   bilateral hip dysplasia status post bilateral varus derotation osteotomys  diaphragmatic hernia  mast cell activation syndrome      Plan of care discussed with patient and family at bedside and with Rn. Discussed management/test result(s) with IR consultant     Global A/P  -Persistent tachycardia - echocardiogram  - wnl.  -started on low dose BB  -Cardiology placed on consult - appreciate recs  -Will await further recommendations from IR  -unable to do thrombectomy - due to anatomy     -transitioned to Lovenox subcutaneous therapeutic dose  -will need to have Lovenox subcutaneous.                   - CT Venogram of Lower extremity - pre-medicate - done                 - appreciate IR recs - plan for another attempt on Monday  -Reviewed previous consultant notes  -Reviewed CBC, BMP, Mag, and Phos  -Reviewed tests ordered  -Repeat labs in am  -MDM: High, severe exacerbation of chronic illness posing a threat to life. IV medications requiring close inpatient monitoring.   -PT/OT   MEDICATIONS ADMINISTERED IN LAST 1 DAY:  carBAMazepine ER (TEGRETOL XR) 12 hr tab 300 mg       Date Action Dose Route User    4/29/2024 1019 Given 300 mg Oral Zena Hammer RN    4/28/2024 2022 Given 300 mg Oral Amalia Yates RN          cetirizine (ZyrTEC) tab 10 mg       Date Action Dose Route User    4/29/2024 1012 Given 10 mg Oral Zena Hammer RN          enoxaparin (Lovenox) 40 MG/0.4ML SUBQ injection 40 mg       Date Action Dose Route User    4/29/2024 1011 Given 40 mg Subcutaneous (Left Lower Abdomen) Zena Hammer RN    4/28/2024 2022 Given 40 mg Subcutaneous (Left Lower Abdomen) Amalia Yates RN          famotidine (Pepcid) tab 20 mg       Date Action Dose Route User    4/29/2024 1012  Given 20 mg Oral Zena Hammer RN    4/28/2024 2023 Given 20 mg Oral Amalia Yates RN          Allegra Allergy (Fexofenadine) 180mg 24 hr tablet - pt's own medication       Date Action Dose Route User    4/29/2024 1013 Given 180 mg Oral Zena Hammer RN    4/28/2024 2023 Given 180 mg Oral Amalia Yates RN          metoprolol tartrate (Lopressor) partial tab 12.5 mg       Date Action Dose Route User    4/29/2024 0458 Given 12.5 mg Oral Amalia Yates RN    4/28/2024 1721 Given 12.5 mg Oral Zoey Hammond RN          sennosides (Senokot) tab 8.6 mg       Date Action Dose Route User    4/29/2024 1012 Given 8.6 mg Oral Zena Hammer RN          sodium chloride 0.9% infusion       Date Action Dose Route User    4/28/2024 1721 New Bag (none) Intravenous Zoey Hammond RN          sodium chloride tab 1 g       Date Action Dose Route User    4/29/2024 1240 Given 1 g Oral Zena Hammer RN    4/29/2024 0730 Given 1 g Oral Amalia Yates RN    4/28/2024 2022 Given 1 g Oral Amalia Yates RN            Vitals (last day)       Date/Time Temp Pulse Resp BP SpO2 Weight O2 Device O2 Flow Rate (L/min) Who          CIWA Scores (since admission)       None            PLEASE FAX DAYS CERTIFIED AND NEXT REVIEW DATE [phone]

## 2024-04-29 NOTE — PROGRESS NOTES
[facility]  part of [facility]    Cardiology Hospital Progress Note       Nicky Nam Patient Status:  Inpatient    2005 MRN [id number]   Location [facility] 5SW/SE Attending Anthony Short MD   Hosp Day # 8 PCP Tu Yeung MD       Patient is a 18 year old female who was admitted to the hospital for Acute deep vein thrombosis (DVT) of iliac vein of left lower extremity (HCC):  Subjective:  Patient in NAD  Mother by the bedside  No cardiac c/o  HR and BP normal  C monitor: Sinus Rhythm    Past Medical History:   Past Medical History:    Celiac disease (HCC)    Epilepsy (HCC)    Hearing loss    Hernia, diaphragmatic, congenital (HCC)    Hip dysplasia (HCC)    Mast cell activation (HCC)    Seizure disorder (HCC)    Urinary reflux    Vesicoureteral reflux       Past Surgical History:  Past Surgical History:   Procedure Laterality Date    Other surgical history      diaphram hernia repair and uretral repair        Family History:  No family history on file.    Social History:  Pediatric History   Patient Parents    Akilah Nam (Mother)    Davion Nam (Father)     Other Topics Concern    Not on file   Social History Narrative    Not on file           Current Medications:  Current Facility-Administered Medications   Medication Dose Route Frequency    metoprolol tartrate (Lopressor) partial tab 12.5 mg  12.5 mg Oral 2x Daily(Beta Blocker)    sodium chloride tab 1 g  1 g Oral TID CC    enoxaparin (Lovenox) 40 MG/0.4ML SUBQ injection 40 mg  1 mg/kg Subcutaneous 2 times per day    sennosides (Senokot) tab 8.6 mg  8.6 mg Oral Daily    ondansetron (Zofran) 4 MG/2ML injection 4 mg  4 mg Intravenous Q6H PRN    acetaminophen (Tylenol) rectal suppository 650 mg  650 mg Rectal Q6H PRN    carBAMazepine ER (TEGRETOL XR) 12 hr tab 300 mg  300 mg Oral Q12H    famotidine (Pepcid) tab 20 mg  20 mg Oral BID    traMADol (Ultram) tab 50 mg  50 mg Oral Q6H PRN    Allegra  Allergy (Fexofenadine) 180mg 24 hr tablet - pt's own medication  180 mg Oral BID    cetirizine (ZyrTEC) tab 10 mg  10 mg Oral Daily    acetaminophen (Tylenol Extra Strength) tab 500 mg  500 mg Oral Q4H PRN     Medications Prior to Admission   Medication Sig    fexofenadine 180 MG Oral Tab Take 1 tablet (180 mg total) by mouth 2 (two) times daily. Mast cell    famotidine 20 MG Oral Tab Take 1 tablet (20 mg total) by mouth 2 (two) times daily.    sodium chloride 1 g Oral Tab Take 1 tablet (1 g total) by mouth 3 (three) times daily with meals.    cetirizine 10 MG Oral Tab Take 1 tablet (10 mg total) by mouth as needed for Allergies.    Cholecalciferol (VITAMIN D) 50 MCG (2000 UT) Oral Cap Take 1 capsule (2,000 Units total) by mouth daily.    NON FORMULARY Take 180 mg by mouth in the morning and 180 mg before bedtime. Allegra non-drowsy 180 mg BID .    CarBAMazepine  MG Oral Capsule SR 12 Hr     EPINEPHrine (EPIPEN 2-KARTHIK) 0.3 MG/0.3ML Injection Solution Auto-injector Inject IM in event of  allergic reaction. Dispense twin pack, ok to dispense generic Mylan if ok with parent    diazepam 10 MG Rectal Gel I 7.5 MG RECTALLY PRF SEIZURE    FLUVIRIN Intramuscular Suspension ADM 0.5ML IM UTD       Allergies:  Allergies   Allergen Reactions    Radiology Contrast Iodinated Dyes ANAPHYLAXIS    Gluten Meal PAIN and FACE FLUSHING     Abdominal pain    Pineapple PAIN and FACE FLUSHING     Abdominal pain      Tomato PAIN and FACE FLUSHING     Abdominal pain     Augmentin [Amoxicillin-Pot Clavulanate] UNKNOWN     Serum sickness    Penicillins      serum sickness    Red Dye RASH       Review of Systems:   A comprehensive 12 point review of system was performed.  All pertinent positive and negative findings per HPI.    Physical Exam:   Blood pressure 114/76, pulse 90, temperature 99.1 °F (37.3 °C), temperature source Tympanic, resp. rate 16, height 4' 11\" (1.499 m), weight 94 lb 6.4 oz (42.8 kg), last menstrual period 04/14/2024,  SpO2 100%.  Intake/Output:   Last 3 shifts: [id number]   This shift: No intake/output data recorded.     Vent Settings:      Hemodynamic parameters (last 24 hours):      Scheduled Meds:    metoprolol tartrate  12.5 mg Oral 2x Daily(Beta Blocker)    sodium chloride  1 g Oral TID CC    enoxaparin  1 mg/kg Subcutaneous 2 times per day    sennosides  8.6 mg Oral Daily    carBAMazepine ER  300 mg Oral Q12H    famotidine  20 mg Oral BID    fexofenadine  180 mg Oral BID    cetirizine  10 mg Oral Daily       Continuous Infusions:       Physical Exam:    LLE swelling+     Results:     Laboratory Data:  Lab Results   Component Value Date    WBC 5.6 04/29/2024    HGB 8.3 (L) 04/29/2024    HCT 26.0 (L) 04/29/2024    .0 04/29/2024    CREATSERUM 0.49 (L) 04/29/2024    BUN 10 04/29/2024     04/29/2024    K 4.1 04/29/2024     04/29/2024    CO2 26.0 04/29/2024    GLU 94 04/29/2024    CA 8.7 04/29/2024    ALB 4.2 04/22/2024    ALKPHO 75 04/22/2024    TP 6.8 04/22/2024    AST 16 04/22/2024    ALT 16 04/22/2024    PTT 75.9 (H) 04/25/2024    INR 1.09 04/21/2024    PTP 14.8 04/21/2024    MG 1.8 04/27/2024    PHOS 3.2 04/27/2024         Imaging:  ECHO:  ECG rhythm:   Sinus tachycardia     ----------------------------------------------------------------------------     Conclusions:     1. Left ventricle: The cavity size was normal. Wall thickness was normal.      Systolic function was normal. The estimated ejection fraction was 60-65%,      by visual assessment. No diagnostic evidence for regional wall motion      abnormalities. Left ventricular diastolic function parameters were      normal.   2. Left atrium: The left atrial volume was normal.   Impressions:  No previous study from [facility] was   available for comparison.     IR procedure:   Bilateral lower extremity venography demonstrating patent right popliteal through common iliac veins, thrombosed left popliteal through common iliac veins, no  communication with the inferior vena cava from the iliac veins.  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  Due to the patient's anatomy and the inability to obtain adequate venous outflow, the decision was made not to perform thrombectomy this time.     Plan: Continue anticoagulation.  IR will see the patient in clinic in 2 weeks.  If patient has persistent left leg swelling, will discuss iliocaval reconstruction.     CT venography:  CONCLUSION:      Extensive DVT within the left common, internal and external iliac veins.      DVT also seen within the left common and superficial femoral veins.      Patent left-sided IVC.      Hypertrophied bilateral paraspinal veins with thrombus in the left paraspinal veins.      Extensive venous engorgement with soft tissue and intramuscular edema within the left proximal thigh.    IMPRESSION:      RECOMMENDATIONS:  Acute deep vein thrombosis (DVT) of iliac vein of left lower extremity (HCC)  On Lovenox @ Therapeutic dose  IR follows for possible thrombectomy  Patient may need Ilio-caval reconstruction     Sinus tachycardia: likely due to anxiety  Anemia : Hb 8.8-->8.3  Anxiety   H/O Seizure disorder  Per Mother of patient , Mitral valve prolapse was diagnosed in the past, ECHO here s/o redundant MV without definite prolapse and without any mitral regurgitation, reassures the mother.    Patient is stable from cardiac standpoint    Remains on Metoprolol 12.5 mg q 12  On Lovenox @ therapeutic dose  Follow H&H closely  Rx of anxiety and pain per PCP  Await IR decision for further Rx-Ilio caval reconstruction?      D/W mother in detail            Please do not hesitate to call with questions.    René Montelongo MD  [facility]  [address]  [place] [address]  [phone]    This note was prepared using QRcao  recognition dictation software. As a result errors may occur. When identified these errors have been corrected. While every attempt is made to correct errors during dictation discrepancies may still exist

## 2024-04-29 NOTE — PROGRESS NOTES
[facility]    Progress Note    Nicky Nam Patient Status:  Inpatient    2005 MRN [id number]   Location [facility] 5SW/SE Attending Anthony Short MD   Hosp Day # 8 PCP Tu Yeung MD     Chief Complaint:     Acute DVT     Subjective:   Subjective:    Patient seen and examined this morning  Afebrile, normotensive  Episode of tachycardia - persistent  Better spirits today, states leg feels slightly better  Mother at bedside  Anxiety has been stable    Objective:   Blood pressure 114/76, pulse 90, temperature 99.1 °F (37.3 °C), temperature source Tympanic, resp. rate 16, height 4' 11\" (1.499 m), weight 94 lb 6.4 oz (42.8 kg), last menstrual period 2024, SpO2 100%.  Physical Exam    General: Patient is alert and oriented  not appear to be in acute distress at this time, development delay  HEENT: EOMI PERRLA, atraumatic normocephalic  Cardiac: S1-S2 appreciated  Lungs: Good air entry bilaterally clear to auscultation  Abdomen: Soft nontender nondistended positive bowel sounds  Ext: Peripheral pulses are positive, LLE edema  Neuro: No focal deficits noted      Results:   Lab Results   Component Value Date    WBC 5.6 2024    HGB 8.3 (L) 2024    HCT 26.0 (L) 2024    .0 2024    CREATSERUM 0.49 (L) 2024    BUN 10 2024     2024    K 4.1 2024     2024    CO2 26.0 2024    GLU 94 2024    CA 8.7 2024    ALB 4.2 2024    ALKPHO 75 2024    BILT 0.2 (L) 2024    TP 6.8 2024    AST 16 2024    ALT 16 2024    PTT 75.9 (H) 2024    INR 1.09 2024    MG 1.8 2024    PHOS 3.2 2024       No results found.        Assessment & Plan:       Acute left lower extremity DVT  -Patient presenting with left lower extremity pain  -Patient initially with mottling and discoloration of left lower extremity  -US venous Doppler  of left leg noting extensive DVT from left external iliac vein to left upper calf veins.  -Continuing on heparin GTT  -IR consulted, recommend proceeding with venogram and thrombolysis.  Plan for 4/23.  Appreciate further recommendations  -CT chest reviewed.  Noted no signs of acute PE.  -Patient with reported history of IV contrast allergy.  Previous completed premedications prior to CT chest.  Plan for additional dosing prior to venogram and thrombolysis as above.    -Pain control as needed  -Continue to monitor     History of seizure disorder  -Continue home antiepileptics     Other medical problems include   Koolen-Agustina syndrome   Asthma   GERD  tethered cord syndrome   neurogenic bladder  Kyphoscoliosis   bilateral hip dysplasia status post bilateral varus derotation osteotomys  diaphragmatic hernia  mast cell activation syndrome      Plan of care discussed with patient and family at bedside and with Rn. Discussed management/test result(s) with IR consultant     Global A/P  -Persistent tachycardia - echocardiogram  - wnl.  -started on low dose BB  -Cardiology placed on consult - appreciate recs  -Will await further recommendations from IR  -unable to do thrombectomy - due to anatomy    -transitioned to Lovenox subcutaneous therapeutic dose  -will need to have Lovenox subcutaneous.     - CT Venogram of Lower extremity - pre-medicate - done   - appreciate IR recs - plan for another attempt on Monday  -Reviewed previous consultant notes  -Reviewed CBC, BMP, Mag, and Phos  -Reviewed tests ordered  -Repeat labs in am  -MDM: High, severe exacerbation of chronic illness posing a threat to life. IV medications requiring close inpatient monitoring.   -PT/OT          Anthony Short MD

## 2024-04-29 NOTE — PLAN OF CARE
Pt alert and oriented. Vitals stable. No complaints of pain. Pt attempted to walk x2 today. No complaints of dizziness. But stated they felt a little nauseous. Pt able to stand for 10 seconds both times. Tachycardic with activity.   Problem: Patient Centered Care  Goal: Patient preferences are identified and integrated in the patient's plan of care  Description: Interventions:  - What would you like us to know as we care for you? Home with family   - Provide timely, complete, and accurate information to patient/family  - Incorporate patient and family knowledge, values, beliefs, and cultural backgrounds into the planning and delivery of care  - Encourage patient/family to participate in care and decision-making at the level they choose  - Honor patient and family perspectives and choices  Outcome: Progressing     Problem: Patient/Family Goals  Goal: Patient/Family Long Term Goal  Description: Patient's Long Term Goal: resolve DVT     Interventions:  - Monitor vitals  - Monitor appropriate labs  - Administer medications as ordered  - Follow MD's orders  - Update patient on plan of care   - Discharge planning     - See additional Care Plan goals for specific interventions  Outcome: Progressing  Goal: Patient/Family Short Term Goal  Description: Patient's Short Term Goal: Transfer to [facility]     Interventions:   - Social work consult   - Follow doctor's orders   - See additional Care Plan goals for specific interventions  Outcome: Progressing     Problem: SKIN/TISSUE INTEGRITY - ADULT  Goal: Skin integrity remains intact  Description: INTERVENTIONS  - Assess and document risk factors for pressure ulcer development  - Assess and document skin integrity  - Monitor for areas of redness and/or skin breakdown  - Initiate interventions, skin care algorithm/standards of care as needed  Outcome: Progressing     Problem: HEMATOLOGIC - ADULT  Goal: Maintains hematologic stability  Description: INTERVENTIONS  - Assess for signs  and symptoms of bleeding or hemorrhage  - Monitor labs and vital signs for trends  - Administer supportive blood products/factors, fluids and medications as ordered and appropriate  - Administer supportive blood products/factors as ordered and appropriate  Outcome: Progressing  Goal: Free from bleeding injury  Description: (Example usage: patient with low platelets)  INTERVENTIONS:  - Avoid intramuscular injections, enemas and rectal medication administration  - Ensure safe mobilization of patient  - Hold pressure on venipuncture sites to achieve adequate hemostasis  - Assess for signs and symptoms of internal bleeding  - Monitor lab trends  - Patient is to report abnormal signs of bleeding to staff  - Avoid use of toothpicks and dental floss  - Use electric shaver for shaving  - Use soft bristle tooth brush  - Limit straining and forceful nose blowing  Outcome: Progressing     Problem: PAIN - ADULT  Goal: Verbalizes/displays adequate comfort level or patient's stated pain goal  Description: INTERVENTIONS:  - Encourage pt to monitor pain and request assistance  - Assess pain using appropriate pain scale  - Administer analgesics based on type and severity of pain and evaluate response  - Implement non-pharmacological measures as appropriate and evaluate response  - Consider cultural and social influences on pain and pain management  - Manage/alleviate anxiety  - Utilize distraction and/or relaxation techniques  - Monitor for opioid side effects  - Notify MD/LIP if interventions unsuccessful or patient reports new pain  - Anticipate increased pain with activity and pre-medicate as appropriate  Outcome: Progressing     Problem: RISK FOR INFECTION - ADULT  Goal: Absence of fever/infection during anticipated neutropenic period  Description: INTERVENTIONS  - Monitor WBC  - Administer growth factors as ordered  - Implement neutropenic guidelines  Outcome: Progressing     Problem: SAFETY ADULT - FALL  Goal: Free from fall  injury  Description: INTERVENTIONS:  - Assess pt frequently for physical needs  - Identify cognitive and physical deficits and behaviors that affect risk of falls.  - [place] fall precautions as indicated by assessment.  - Educate pt/family on patient safety including physical limitations  - Instruct pt to call for assistance with activity based on assessment  - Modify environment to reduce risk of injury  - Provide assistive devices as appropriate  - Consider OT/PT consult to assist with strengthening/mobility  - Encourage toileting schedule  Outcome: Progressing     Problem: DISCHARGE PLANNING  Goal: Discharge to home or other facility with appropriate resources  Description: INTERVENTIONS:  - Identify barriers to discharge w/pt and caregiver  - Include patient/family/discharge partner in discharge planning  - Arrange for needed discharge resources and transportation as appropriate  - Identify discharge learning needs (meds, wound care, etc)  - Arrange for interpreters to assist at discharge as needed  - Consider post-discharge preferences of patient/family/discharge partner  - Complete POLST form as appropriate  - Assess patient's ability to be responsible for managing their own health  - Refer to Case Management Department for coordinating discharge planning if the patient needs post-hospital services based on physician/LIP order or complex needs related to functional status, cognitive ability or social support system  Outcome: Progressing

## 2024-04-29 NOTE — PROGRESS NOTES
[facility] [place]  part of [facility]  Progress Note    Nicky Nam Patient Status:  Inpatient    2005 MRN [id number]   Location [facility] 5SW/SE Attending Anthony Short MD   Hosp Day # 8 PCP Tu Yeung MD       Subjective:   Left leg swelling improved.  Motivated to stand and increase activity.     Objective:   Physical Exam  Constitutional:       Appearance: Normal appearance.   Cardiovascular:      Rate and Rhythm: Tachycardia present.      Comments: Swelling improved.  Able to freely bend her leg.  No numbness, good color, sensation and movement.   Pulmonary:      Effort: No respiratory distress.   Abdominal:      General: There is no distension.   Musculoskeletal:      Left lower leg: Edema present.   Neurological:      Mental Status: She is alert.        Blood pressure 114/76, pulse 90, temperature 99.1 °F (37.3 °C), temperature source Tympanic, resp. rate 16, height 59\", weight 94 lb 6.4 oz (42.8 kg), last menstrual period 2024, SpO2 100%.    Results:   Labs:  Lab Results   Component Value Date    WBC 5.6 2024    RBC 3.38 2024    HGB 8.3 2024    HCT 26.0 2024    MCV 76.9 2024    MCH 24.6 2024    MCHC 31.9 2024    RDW 14.8 2024    .0 2024       Assessment and Plan:   Left leg DVT - swelling improved.    Recommend - continue anticoagulation, advance activity.      Follow up as outpatient to discuss further recommendation regarding thrombolysis/IVC reconstruction      VALERIE AGUIAR, APRN  2024

## 2024-04-29 NOTE — SPIRITUAL CARE NOTE
Spiritual Care Visit Note    Patient Name: Nicky Nam Date of Spiritual Care Visit: 24   : 2005 Primary Dx: Acute deep vein thrombosis (DVT) of iliac vein of left lower extremity (HCC)       Referred By: Referral From:     Spiritual Care Taxonomy:    Intended Effects: Establish rapport and connectedness    Methods: Collaborate with care team member;Offer support    Interventions: Active listening;Ask guided questions;Provide compassionate touch    Visit Type/Summary:     - Spiritual Care: Consulted with RN prior to visit. Offered empathic listening and emotional support. Provided information regarding how to contact Spiritual Care and left a Spiritual Care information card. Provided support for Patient's spiritual/Jain requests. Offered prayer. Writer report mother and father at bedside.  Mother mentioned family  anointed patient yesterday and she wants prayer. Writer extended prayer for patient and parents. No other need at this time.    Spiritual Care support can be requested via an Epic consult. For urgent/immediate needs, please contact the On Call  at: [place]: ext [phone]    ROLANDA Addison   [phone]

## 2024-04-30 VITALS
RESPIRATION RATE: 18 BRPM | WEIGHT: 94.38 LBS | DIASTOLIC BLOOD PRESSURE: 75 MMHG | HEIGHT: 59 IN | OXYGEN SATURATION: 100 % | BODY MASS INDEX: 19.03 KG/M2 | SYSTOLIC BLOOD PRESSURE: 106 MMHG | HEART RATE: 104 BPM | TEMPERATURE: 99 F

## 2024-04-30 PROCEDURE — 99239 HOSP IP/OBS DSCHRG MGMT >30: CPT | Performed by: HOSPITALIST

## 2024-04-30 RX ORDER — ENOXAPARIN SODIUM 100 MG/ML
1 INJECTION SUBCUTANEOUS EVERY 12 HOURS SCHEDULED
Qty: 30 EACH | Refills: 0 | Status: SHIPPED | OUTPATIENT
Start: 2024-04-30

## 2024-04-30 RX ORDER — TRAMADOL HYDROCHLORIDE 50 MG/1
50 TABLET ORAL EVERY 6 HOURS PRN
Qty: 20 TABLET | Refills: 0 | Status: SHIPPED | OUTPATIENT
Start: 2024-04-30

## 2024-04-30 NOTE — PHYSICAL THERAPY NOTE
Chart reviewed for PT treatment, currently DAY 9. Spoke with MIGUEL Freitas and patient was able to stand for a few seconds twice yesterday but then returned to bed. Discussion with RN and requested that patient be lifted up to chair so she can improve tolerance to upright and activity and we can attempt skilled therapy to progress to standing and walking. Per PT notes patient is able to move in bed and sit EOB but becomes very anxious and unable to progress. She was up per RN Daily cares one time on 4/26 to the chair.  Recommend patient be up to chair DAILY to improve activity tolerance and ability to progress to plf. RN to let PT know once patient is up and stable in sitting so PT can work with patient.

## 2024-04-30 NOTE — PHYSICAL THERAPY NOTE
PHYSICAL THERAPY TREATMENT NOTE - INPATIENT     Room Number: 526/526-A       Presenting Problem: acute deep vein thrombosis of iliac vein of left lower extremity       Problem List  Principal Problem:    Acute deep vein thrombosis (DVT) of iliac vein of left lower extremity (HCC)      PHYSICAL THERAPY ASSESSMENT   Patient demonstrates good  progress this session, goals  remain in progress.    Patient continues to function below baseline with transfers, gait, stair negotiation, maintaining seated position, standing prolonged periods, and performing household tasks.  Contributing factors to remaining limitations include decreased endurance/aerobic capacity, pain, impaired standing balance, and fear of causing clot to move and dying  as well as elevated heart rate with activity.  Next session anticipate patient to progress gait, stair negotiation, maintaining seated position, and standing prolonged periods.  Physical Therapy will continue to follow patient for duration of hospitalization.    Patient continues to benefit from continued skilled PT services: at discharge to promote functional independence and safety with additional support and return home with home health PT.    PLAN  PT Treatment Plan: Endurance;Gait training;Transfer training;Balance training;Patient education;Family education;Body mechanics  Frequency (Obs): 5x/week    SUBJECTIVE  \"I'm not sure I can walk today\"  Mother expressing concerns that patient has not been walking and if she will be able to go home    OBJECTIVE  Precautions: Limb alert - left (DVT LLE)    WEIGHT BEARING RESTRICTION   None, WBAT LLE             PAIN ASSESSMENT   Rating: 3  Location: left leg, ankle stiffness  Management Techniques: Activity promotion;Repositioning;Breathing techniques    BALANCE  Static Sitting: Normal  Dynamic Sitting: Normal  Static Standing: Fair +  Dynamic Standing: Fair -    ACTIVITY TOLERANCE  Pulse: 106  Heart Rate Source: Monitor                    O2  WALK  Oxygen Therapy  SPO2% on Room Air at Rest: 100  SPO2% Ambulation on Room Air: 100    AM-PAC '6-Clicks' INPATIENT SHORT FORM - BASIC MOBILITY  How much difficulty does the patient currently have...  Patient Difficulty: Turning over in bed (including adjusting bedclothes, sheets and blankets)?: None   Patient Difficulty: Sitting down on and standing up from a chair with arms (e.g., wheelchair, bedside commode, etc.): A Little   Patient Difficulty: Moving from lying on back to sitting on the side of the bed?: None   How much help from another person does the patient currently need...   Help from Another: Moving to and from a bed to a chair (including a wheelchair)?: A Little   Help from Another: Need to walk in hospital room?: A Little   Help from Another: Climbing 3-5 steps with a railing?: A Lot     AM-PAC Score:  Raw Score: 19   Approx Degree of Impairment: 41.77%   Standardized Score (AM-PAC Scale): 45.44   CMS Modifier (G-Code): CK    FUNCTIONAL ABILITY STATUS  Functional Mobility/Gait Assessment  Gait Assistance: Contact guard assist;Supervision  Distance (ft): 15'x2  Assistive Device: Rolling walker  Pattern: L Decreased stance time (step to pattern, able to progrss to stp through as well)  Rolling: independent  Supine to Sit: modified independent  Sit to Supine: contact guard assist  Sit to Stand: contact guard assist    Additional information: RN approves participation in therapy session and coordinated with patient schedule as pt has class today virtually. Patient presents in bed with mother and grandfather at bedside and encouraging patient. She is hesitant but ultimately agreeable to work with PT. She has many questions about her clot and if she will cause more problems if she moves. Extensive education provided to pt and family with all questions answered. Discussion with patient that it is normal for ankle to feel tight because she hasn't had it flat for at least a week, she does mostly toe walking  but is able to relax foot flat at rest in standing by end of session. She is motivated and able to stand and walk with CGA at most, very slow yaneth and step to pattern with stops but physically moving well. Able to sit on toilet and get up with min A. Mother present throughout and educated and able to demonstrate the ability to help patient. Discussion and training in HEP as well as positioning of LE and elevating intermittently throughout the day but okay for leg to be down for 15-30 min and monitor for swelling. Education for short frequent bouts of walking, standing throughout the day at least hourly and she is agreeable.  Her HR is elevated to 106 max but patient is anxious so education provided that it is normal for HR to go up especially since she hasn't been moving much and she is more calm an receptive to activity with minimal concern about HR after this. Plan for patient to be walking today and will plan for stair navigation tomorrow. Mother feels much better and feels she will be able to manage patient at home. All needs in reach and questions answered.    The patient's Approx Degree of Impairment: 41.77% has been calculated based on documentation in the [facility] '6 clicks' Inpatient Daily Activity Short Form.  Research supports that patients with this level of impairment may benefit from home with 24 hour care and HHPT and this is the recommendation, mother and patient in agreement.  Final disposition will be made by interdisciplinary medical team.    THERAPEUTIC EXERCISES  Lower Extremity Alternating marching  Ankle pumps  Heel raises  Heel slides  LAQ  Quad sets  SAQ  Toe raises     Position Sitting & Standing       Patient End of Session: Up in chair;Needs met;Call light within reach;RN aware of session/findings;All patient questions and concerns addressed;Family present;Discussed recommendations with /    CURRENT GOALS     Goals to be met by: 5/3/24  Patient Goal Patient's  self-stated goal is: go home   Goal #1 Patient is able to demonstrate supine - sit EOB @ level: modified independent      Goal #1   Current Status MET- using UE's to assist left leg on/off of bed   Goal #2 Patient is able to demonstrate transfers Sit to/from Stand at assistance level: modified independent with none or least restrictive assistive device       Goal #2  Current Status Progressing - CGA with cues for LLE positioning, needs min A from toilet and using UE's but able to perform with assist from Mom   Goal #3 Patient is able to ambulate 150 feet with assist device: none or least restrictive assistive device at assistance level: supervision   Goal #3   Current Status Progressing: pt is able to walk 5' to couch then 15'x2 with a Rw and standing rest breaks during and between walks. Pt is up on toe much of the gait cycle but improving with each walk. She is able to stand with left foot flat and 50/50 weight on R/L leg and is aware to work on this each hour as well. Good return demo   Goal #4 Patient will negotiate 2 stairs/one curb w/ assistive device and supervision   Goal #4   Current Status NT-plan to try tomorrow   Goal #5 Patient to demonstrate independence with home activity/exercise instructions provided to patient in preparation for discharge.   Goal #5   Current Status Ongoing-pt and mother verbalizing and demonstrating good carryover this session.   Goal #6     Goal #6  Current Status       Therapeutic Activity: 28 minutes

## 2024-04-30 NOTE — PLAN OF CARE
Pt alert and oriented, vitals stable. Patient able to walk with physical therapy today to bathroom and around room. Cleared for discharge. Follow up appointment already set up with Dr. Syed  Problem: Patient Centered Care  Goal: Patient preferences are identified and integrated in the patient's plan of care  Description: Interventions:  - What would you like us to know as we care for you? Home with family   - Provide timely, complete, and accurate information to patient/family  - Incorporate patient and family knowledge, values, beliefs, and cultural backgrounds into the planning and delivery of care  - Encourage patient/family to participate in care and decision-making at the level they choose  - Honor patient and family perspectives and choices  Outcome: Adequate for Discharge     Problem: Patient/Family Goals  Goal: Patient/Family Long Term Goal  Description: Patient's Long Term Goal: resolve DVT     Interventions:  - Monitor vitals  - Monitor appropriate labs  - Administer medications as ordered  - Follow MD's orders  - Update patient on plan of care   - Discharge planning     - See additional Care Plan goals for specific interventions  Outcome: Adequate for Discharge  Goal: Patient/Family Short Term Goal  Description: Patient's Short Term Goal: Transfer to [facility]     Interventions:   - Social work consult   - Follow doctor's orders   - See additional Care Plan goals for specific interventions  Outcome: Adequate for Discharge     Problem: SKIN/TISSUE INTEGRITY - ADULT  Goal: Skin integrity remains intact  Description: INTERVENTIONS  - Assess and document risk factors for pressure ulcer development  - Assess and document skin integrity  - Monitor for areas of redness and/or skin breakdown  - Initiate interventions, skin care algorithm/standards of care as needed  Outcome: Adequate for Discharge     Problem: HEMATOLOGIC - ADULT  Goal: Maintains hematologic stability  Description: INTERVENTIONS  - Assess for  signs and symptoms of bleeding or hemorrhage  - Monitor labs and vital signs for trends  - Administer supportive blood products/factors, fluids and medications as ordered and appropriate  - Administer supportive blood products/factors as ordered and appropriate  Outcome: Adequate for Discharge  Goal: Free from bleeding injury  Description: (Example usage: patient with low platelets)  INTERVENTIONS:  - Avoid intramuscular injections, enemas and rectal medication administration  - Ensure safe mobilization of patient  - Hold pressure on venipuncture sites to achieve adequate hemostasis  - Assess for signs and symptoms of internal bleeding  - Monitor lab trends  - Patient is to report abnormal signs of bleeding to staff  - Avoid use of toothpicks and dental floss  - Use electric shaver for shaving  - Use soft bristle tooth brush  - Limit straining and forceful nose blowing  Outcome: Adequate for Discharge     Problem: PAIN - ADULT  Goal: Verbalizes/displays adequate comfort level or patient's stated pain goal  Description: INTERVENTIONS:  - Encourage pt to monitor pain and request assistance  - Assess pain using appropriate pain scale  - Administer analgesics based on type and severity of pain and evaluate response  - Implement non-pharmacological measures as appropriate and evaluate response  - Consider cultural and social influences on pain and pain management  - Manage/alleviate anxiety  - Utilize distraction and/or relaxation techniques  - Monitor for opioid side effects  - Notify MD/LIP if interventions unsuccessful or patient reports new pain  - Anticipate increased pain with activity and pre-medicate as appropriate  Outcome: Adequate for Discharge     Problem: RISK FOR INFECTION - ADULT  Goal: Absence of fever/infection during anticipated neutropenic period  Description: INTERVENTIONS  - Monitor WBC  - Administer growth factors as ordered  - Implement neutropenic guidelines  Outcome: Adequate for Discharge      Problem: SAFETY ADULT - FALL  Goal: Free from fall injury  Description: INTERVENTIONS:  - Assess pt frequently for physical needs  - Identify cognitive and physical deficits and behaviors that affect risk of falls.  - [place] fall precautions as indicated by assessment.  - Educate pt/family on patient safety including physical limitations  - Instruct pt to call for assistance with activity based on assessment  - Modify environment to reduce risk of injury  - Provide assistive devices as appropriate  - Consider OT/PT consult to assist with strengthening/mobility  - Encourage toileting schedule  Outcome: Adequate for Discharge     Problem: DISCHARGE PLANNING  Goal: Discharge to home or other facility with appropriate resources  Description: INTERVENTIONS:  - Identify barriers to discharge w/pt and caregiver  - Include patient/family/discharge partner in discharge planning  - Arrange for needed discharge resources and transportation as appropriate  - Identify discharge learning needs (meds, wound care, etc)  - Arrange for interpreters to assist at discharge as needed  - Consider post-discharge preferences of patient/family/discharge partner  - Complete POLST form as appropriate  - Assess patient's ability to be responsible for managing their own health  - Refer to Case Management Department for coordinating discharge planning if the patient needs post-hospital services based on physician/LIP order or complex needs related to functional status, cognitive ability or social support system  Outcome: Adequate for Discharge

## 2024-04-30 NOTE — PROGRESS NOTES
[facility]  part of [facility]    Progress Note    Nicky Nam Patient Status:  Inpatient    2005 MRN [id number]   Location [facility] 5SW/SE Attending Anthony Short MD   Hosp Day # 9 PCP Tu Yeung MD     Chief Complaint:     Acute DVT     Subjective:   Subjective:    Patient seen and examined this morning  Afebrile, normotensive  Episode of tachycardia - persistent  Better spirits today, states leg feels slightly better  Mother at bedside  Anxiety has been stable    Objective:   Blood pressure 106/75, pulse 109, temperature 98.8 °F (37.1 °C), temperature source Tympanic, resp. rate 18, height 4' 11\" (1.499 m), weight 94 lb 6.4 oz (42.8 kg), last menstrual period 2024, SpO2 100%.  Physical Exam    General: Patient is alert and oriented  not appear to be in acute distress at this time, development delay  HEENT: EOMI PERRLA, atraumatic normocephalic  Cardiac: S1-S2 appreciated  Lungs: Good air entry bilaterally clear to auscultation  Abdomen: Soft nontender nondistended positive bowel sounds  Ext: Peripheral pulses are positive, LLE edema  Neuro: No focal deficits noted      Results:   Lab Results   Component Value Date    WBC 5.6 2024    HGB 8.3 (L) 2024    HCT 26.0 (L) 2024    .0 2024    CREATSERUM 0.49 (L) 2024    BUN 10 2024     2024    K 4.1 2024     2024    CO2 26.0 2024    GLU 94 2024    CA 8.7 2024    ALB 4.2 2024    ALKPHO 75 2024    BILT 0.2 (L) 2024    TP 6.8 2024    AST 16 2024    ALT 16 2024    PTT 75.9 (H) 2024    INR 1.09 2024    MG 2.1 2024    PHOS 3.2 2024       No results found.        Assessment & Plan:       Acute left lower extremity DVT  -Patient presenting with left lower extremity pain  -Patient initially with mottling and discoloration of left lower extremity  -US venous Doppler  of left leg noting extensive DVT from left external iliac vein to left upper calf veins.  -Continuing on heparin GTT  -IR consulted, recommend proceeding with venogram and thrombolysis.  Plan for 4/23.  Appreciate further recommendations  -CT chest reviewed.  Noted no signs of acute PE.  -Patient with reported history of IV contrast allergy.  Previous completed premedications prior to CT chest.  Plan for additional dosing prior to venogram and thrombolysis as above.    -Pain control as needed  -Continue to monitor     History of seizure disorder  -Continue home antiepileptics     Other medical problems include   Koolen-Agustina syndrome   Asthma   GERD  tethered cord syndrome   neurogenic bladder  Kyphoscoliosis   bilateral hip dysplasia status post bilateral varus derotation osteotomys  diaphragmatic hernia  mast cell activation syndrome      Plan of care discussed with patient and family at bedside and with Rn. Discussed management/test result(s) with IR consultant     Global A/P  -Persistent tachycardia - echocardiogram  - wnl.  -started on low dose BB  -Cardiology placed on consult - appreciate recs  -per IR   -unable to do thrombectomy - due to anatomy    -transitioned to Lovenox subcutaneous therapeutic dose  -will need to have Lovenox subcutaneous.     - CT Venogram of Lower extremity - pre-medicate - done   - appreciate IR recs - plan for another attempt on Monday  -Reviewed previous consultant notes  -Reviewed CBC, BMP, Mag, and Phos  -Reviewed tests ordered  -Repeat labs in am  -MDM: High, severe exacerbation of chronic illness posing a threat to life. IV medications requiring close inpatient monitoring.   -Patient is medically cleared - will discuss with mother to see if they would like to evaluate by PM&R.         Anthony hSort MD

## 2024-04-30 NOTE — PLAN OF CARE
Problem: Patient Centered Care  Goal: Patient preferences are identified and integrated in the patient's plan of care  Description: Interventions:  - What would you like us to know as we care for you? Home with family   - Provide timely, complete, and accurate information to patient/family  - Incorporate patient and family knowledge, values, beliefs, and cultural backgrounds into the planning and delivery of care  - Encourage patient/family to participate in care and decision-making at the level they choose  - Honor patient and family perspectives and choices  Outcome: Progressing     Problem: Patient/Family Goals  Goal: Patient/Family Long Term Goal  Description: Patient's Long Term Goal: resolve DVT     Interventions:  - Monitor vitals  - Monitor appropriate labs  - Administer medications as ordered  - Follow MD's orders  - Update patient on plan of care   - Discharge planning     - See additional Care Plan goals for specific interventions  Outcome: Progressing  Goal: Patient/Family Short Term Goal  Description: Patient's Short Term Goal: Transfer to [facility]     Interventions:   - Social work consult   - Follow doctor's orders   - See additional Care Plan goals for specific interventions  Outcome: Progressing     Problem: SKIN/TISSUE INTEGRITY - ADULT  Goal: Skin integrity remains intact  Description: INTERVENTIONS  - Assess and document risk factors for pressure ulcer development  - Assess and document skin integrity  - Monitor for areas of redness and/or skin breakdown  - Initiate interventions, skin care algorithm/standards of care as needed  Outcome: Progressing     Problem: HEMATOLOGIC - ADULT  Goal: Maintains hematologic stability  Description: INTERVENTIONS  - Assess for signs and symptoms of bleeding or hemorrhage  - Monitor labs and vital signs for trends  - Administer supportive blood products/factors, fluids and medications as ordered and appropriate  - Administer supportive blood products/factors as  ordered and appropriate  Outcome: Progressing  Goal: Free from bleeding injury  Description: (Example usage: patient with low platelets)  INTERVENTIONS:  - Avoid intramuscular injections, enemas and rectal medication administration  - Ensure safe mobilization of patient  - Hold pressure on venipuncture sites to achieve adequate hemostasis  - Assess for signs and symptoms of internal bleeding  - Monitor lab trends  - Patient is to report abnormal signs of bleeding to staff  - Avoid use of toothpicks and dental floss  - Use electric shaver for shaving  - Use soft bristle tooth brush  - Limit straining and forceful nose blowing  Outcome: Progressing     Problem: PAIN - ADULT  Goal: Verbalizes/displays adequate comfort level or patient's stated pain goal  Description: INTERVENTIONS:  - Encourage pt to monitor pain and request assistance  - Assess pain using appropriate pain scale  - Administer analgesics based on type and severity of pain and evaluate response  - Implement non-pharmacological measures as appropriate and evaluate response  - Consider cultural and social influences on pain and pain management  - Manage/alleviate anxiety  - Utilize distraction and/or relaxation techniques  - Monitor for opioid side effects  - Notify MD/LIP if interventions unsuccessful or patient reports new pain  - Anticipate increased pain with activity and pre-medicate as appropriate  Outcome: Progressing     Problem: RISK FOR INFECTION - ADULT  Goal: Absence of fever/infection during anticipated neutropenic period  Description: INTERVENTIONS  - Monitor WBC  - Administer growth factors as ordered  - Implement neutropenic guidelines  Outcome: Progressing     Problem: SAFETY ADULT - FALL  Goal: Free from fall injury  Description: INTERVENTIONS:  - Assess pt frequently for physical needs  - Identify cognitive and physical deficits and behaviors that affect risk of falls.  - [place] fall precautions as indicated by assessment.  - Educate  pt/family on patient safety including physical limitations  - Instruct pt to call for assistance with activity based on assessment  - Modify environment to reduce risk of injury  - Provide assistive devices as appropriate  - Consider OT/PT consult to assist with strengthening/mobility  - Encourage toileting schedule  Outcome: Progressing     Problem: DISCHARGE PLANNING  Goal: Discharge to home or other facility with appropriate resources  Description: INTERVENTIONS:  - Identify barriers to discharge w/pt and caregiver  - Include patient/family/discharge partner in discharge planning  - Arrange for needed discharge resources and transportation as appropriate  - Identify discharge learning needs (meds, wound care, etc)  - Arrange for interpreters to assist at discharge as needed  - Consider post-discharge preferences of patient/family/discharge partner  - Complete POLST form as appropriate  - Assess patient's ability to be responsible for managing their own health  - Refer to Case Management Department for coordinating discharge planning if the patient needs post-hospital services based on physician/LIP order or complex needs related to functional status, cognitive ability or social support system  Outcome: Progressing

## 2024-04-30 NOTE — DISCHARGE SUMMARY
[facility]  part of [facility]     Discharge Summary    Nicky Nam Patient Status:  Inpatient    2005 MRN [id number]   Location [facility] 5SW/SE Attending Anthony Short MD   Hosp Day # 9 PCP Tu Yeung MD     Date of Admission: 2024    Date of Discharge: 2024    Admitting Diagnosis: Acute deep vein thrombosis (DVT) of iliac vein of left lower extremity (HCC) [I82.422]    Discharge Diagnosis:   Patient Active Problem List   Diagnosis    Acute deep vein thrombosis (DVT) of iliac vein of left lower extremity (HCC)       Reason for Admission:     ACUTE DVT    Physical Exam:     General: Patient is alert and oriented x3 does not appear to be in acute distress at this time  HEENT: EOMI PERRLA, atraumatic normocephalic  Cardiac: S1-S2 appreciated  Lungs: Good air entry bilaterally clear to auscultation  Abdomen: Soft nontender nondistended positive bowel sounds  Ext: Peripheral pulses are positive  Neuro: No focal deficits noted  Psych: Normal mood  Skin: No rashes noted  MSK: Full range of motion intact      Hospital Course:     Patient presenting with left lower extremity pain  -Patient initially with mottling and discoloration of left lower extremity  -US venous Doppler of left leg noting extensive DVT from left external iliac vein to left upper calf veins.  -Continuing on heparin GTT  -IR consulted, recommend proceeding with venogram and thrombolysis.  Plan for .  Appreciate further recommendations  -CT chest reviewed.  Noted no signs of acute PE.  -Patient with reported history of IV contrast allergy.  Previous completed premedications prior to CT chest.  Plan for additional dosing prior to venogram and thrombolysis as above.    -Pain control as needed  -Continue to monitor     History of seizure disorder  -Continue home antiepileptics     Other medical problems include   Koolen-Agustina syndrome   Asthma   GERD  tethered cord syndrome    neurogenic bladder  Kyphoscoliosis   bilateral hip dysplasia status post bilateral varus derotation osteotomys  diaphragmatic hernia  mast cell activation syndrome      Plan of care discussed with patient and family at bedside and with Rn. Discussed management/test result(s) with IR consultant     Global A/P  -Persistent tachycardia - echocardiogram  - wnl.  -started on low dose BB  -Cardiology placed on consult - appreciate recs  -per IR   -unable to do thrombectomy - due to anatomy     -transitioned to Lovenox subcutaneous therapeutic dose  -will need to have Lovenox subcutaneous.                   - CT Venogram of Lower extremity - pre-medicate - done                 - appreciate IR recs - plan for another attempt on Monday  -Reviewed previous consultant notes  -Reviewed CBC, BMP, Mag, and Phos  -Reviewed tests ordered  -Repeat labs in am  -MDM: High, severe exacerbation of chronic illness posing a threat to life. IV medications requiring close inpatient monitoring.   -Patient is medically cleared - will discuss with mother to see if they would like to evaluate by PM&R.    History of Present Illness:     his is a 18 year oldfemale who presented complaining of left leg pain.  Patient stated pain began on morning of admission.  Pain was mostly located in her left hip and thigh.  Pain was worsened with bearing weight and moving.  Mother at bedside noted development of bluish discoloration prompting visit to ED.  Upon further review, mother did state patient has been having an abnormal gait for the past day or so.  At time of interview, patient stated pain was still present, but improved.  Pain was moving down into her left calf.  Patient denied chest pain or shortness of breath.     Of note, patient has history of mast cell activation syndrome.and Koolen winston syndrome.  Patient's mother denies known history of family clotting disorders.  Mother did note \" possible weak positivity\" for lupus  anticoagulant.    Disposition: Home or Self Care    Discharge Condition: Good    Discharge Medications:   Current Discharge Medication List        START taking these medications    Details   enoxaparin 40 MG/0.4ML Injection Solution Prefilled Syringe Inject 0.43 mL (43 mg total) into the skin every 12 (twelve) hours.  Qty: 30 each, Refills: 0      metoprolol tartrate 25 MG Oral Tab Take 0.5 tablets (12.5 mg total) by mouth 2x Daily(Beta Blocker).  Qty: 30 tablet, Refills: 0      traMADol 50 MG Oral Tab Take 1 tablet (50 mg total) by mouth every 6 (six) hours as needed.  Qty: 20 tablet, Refills: 0    Associated Diagnoses: Acute deep vein thrombosis (DVT) of iliac vein of left lower extremity (HCC)           CONTINUE these medications which have NOT CHANGED    Details   fexofenadine 180 MG Oral Tab Take 1 tablet (180 mg total) by mouth 2 (two) times daily. Mast cell      famotidine 20 MG Oral Tab Take 1 tablet (20 mg total) by mouth 2 (two) times daily.      sodium chloride 1 g Oral Tab Take 1 tablet (1 g total) by mouth 3 (three) times daily with meals.      cetirizine 10 MG Oral Tab Take 1 tablet (10 mg total) by mouth as needed for Allergies.      Cholecalciferol (VITAMIN D) 50 MCG (2000 UT) Oral Cap Take 1 capsule (2,000 Units total) by mouth daily.      NON FORMULARY Take 180 mg by mouth in the morning and 180 mg before bedtime. Allegra non-drowsy 180 mg BID .      CarBAMazepine  MG Oral Capsule SR 12 Hr Refills: 8      EPINEPHrine (EPIPEN 2-KARTHIK) 0.3 MG/0.3ML Injection Solution Auto-injector Inject IM in event of  allergic reaction. Dispense twin pack, ok to dispense generic Mylan if ok with parent  Qty: 2 each, Refills: 0      diazepam 10 MG Rectal Gel I 7.5 MG RECTALLY PRF SEIZURE  Refills: 0      FLUVIRIN Intramuscular Suspension ADM 0.5ML IM UTD  Refills: 0             Total dc time > 30 min    Anthony Short MD  4/30/2024  5:15 PM     Hospital Discharge Diagnoses:  Acute DVT    Lace+ Score: 21  59-90  High Risk  29-58 Medium Risk  0-28   Low Risk.    TCM Follow-Up Recommendation:  LACE > 58: High Risk of readmission after discharge from the hospital.

## 2024-04-30 NOTE — PAYOR COMM NOTE
--------------  CONTINUED STAY REVIEW    Payor: [facility]  Subscriber #:  [id number]  Authorization Number: [id number]    Admit date: 24  Admit time:  3:34 PM    REVIEW DOCUMENTATION:    Hospitalist   Progress Note           Nicky Akilah Nam Patient Status:  Inpatient    2005 MRN [id number]   Location [facility] 5SW/SE Attending Anthony Short MD   Hosp Day # 9 PCP Tu Yeung MD      Chief Complaint:      Acute DVT         Subjective:  Subjective:     Patient seen and examined this morning    Episode of tachycardia - persistent  Better spirits today, states leg feels slightly better  Mother at bedside  Anxiety has been stable           Objective:  Blood pressure 106/75, pulse 109, temperature 98.8 °F (37.1 °C), temperature source Tympanic, resp. rate 18, height 4' 11\" (1.499 m), weight 94 lb 6.4 oz (42.8 kg), last menstrual period 2024, SpO2 100%.  Physical Exam     General: Patient is alert and oriented  not appear to be in acute distress at this time, development delay  HEENT: EOMI PERRLA, atraumatic normocephalic  Cardiac: S1-S2 appreciated  Lungs: Good air entry bilaterally clear to auscultation  Abdomen: Soft nontender nondistended positive bowel sounds  Ext: Peripheral pulses are positive, LLE edema  Neuro: No focal deficits noted              Results:[]Expand by Default        Lab Results   Component Value Date     WBC 5.6 2024     HGB 8.3 (L) 2024     HCT 26.0 (L) 2024     .0 2024     CREATSERUM 0.49 (L) 2024     BUN 10 2024      2024     K 4.1 2024      2024     CO2 26.0 2024     GLU 94 2024     CA 8.7 2024     ALB 4.2 2024     ALKPHO 75 2024     BILT 0.2 (L) 2024     TP 6.8 2024     AST 16 2024     ALT 16 2024     PTT 75.9 (H) 2024     INR 1.09 2024     MG 2.1 2024     PHOS 3.2 2024         No results found.                Assessment & Plan:  Acute left lower extremity DVT  -Patient presenting with left lower extremity pain  -Patient initially with mottling and discoloration of left lower extremity  -US venous Doppler of left leg noting extensive DVT from left external iliac vein to left upper calf veins.  -Continuing on heparin GTT  -IR consulted, recommend proceeding with venogram and thrombolysis.  Plan for 4/23.  Appreciate further recommendations  -CT chest reviewed.  Noted no signs of acute PE.  -Patient with reported history of IV contrast allergy.  Previous completed premedications prior to CT chest.  Plan for additional dosing prior to venogram and thrombolysis as above.    -Pain control as needed  -Continue to monitor     History of seizure disorder  -Continue home antiepileptics     Other medical problems include   Koolen-Agustina syndrome   Asthma   GERD  tethered cord syndrome   neurogenic bladder  Kyphoscoliosis   bilateral hip dysplasia status post bilateral varus derotation osteotomys  diaphragmatic hernia  mast cell activation syndrome      Plan of care discussed with patient and family at bedside and with Rn. Discussed management/test result(s) with IR consultant     Global A/P  -Persistent tachycardia   -started on low dose BB  -Cardiology placed on consult - appreciate recs  -per IR   -unable to do thrombectomy - due to anatomy     -transitioned to Lovenox subcutaneous therapeutic dose  -will need to have Lovenox subcutaneous.                   - CT Venogram of Lower extremity - pre-medicate - done                 - appreciate IR recs - plan for another attempt on Monday  -Reviewed previous consultant notes  -Reviewed CBC, BMP, Mag, and Phos  -Reviewed tests ordered  -Repeat labs in am  -MDM: High, severe exacerbation of chronic illness posing a threat to life. IV medications requiring close inpatient monitoring.   -will discuss with mother to see if they would like to evaluate by PM&R.               MEDICATIONS ADMINISTERED IN LAST 1 DAY:  carBAMazepine ER (TEGRETOL XR) 12 hr tab 300 mg       Date Action Dose Route User    4/30/2024 0928 Given 300 mg Oral Zena Hammer RN    4/29/2024 2113 Given 300 mg Oral Raquel Toney RN          cetirizine (ZyrTEC) tab 10 mg       Date Action Dose Route User    4/30/2024 0928 Given 10 mg Oral Zena Hammer RN          enoxaparin (Lovenox) 40 MG/0.4ML SUBQ injection 40 mg       Date Action Dose Route User    4/30/2024 0928 Given 40 mg Subcutaneous (Left Lower Abdomen) Zena Hammer RN    4/29/2024 2115 Given 40 mg Subcutaneous (Right Lower Abdomen) Raquel Toney RN          famotidine (Pepcid) tab 20 mg       Date Action Dose Route User    4/30/2024 0928 Given 20 mg Oral Zena Hammer RN    4/29/2024 2113 Given 20 mg Oral Raquel Toney RN          Allegra Allergy (Fexofenadine) 180mg 24 hr tablet - pt's own medication       Date Action Dose Route User    4/30/2024 0928 Given 180 mg Oral Zena Hammer RN    4/29/2024 2114 Given 180 mg Oral Raquel Toney RN          metoprolol tartrate (Lopressor) partial tab 12.5 mg       Date Action Dose Route User    4/30/2024 0622 Given 12.5 mg Oral Raquel Toney RN    4/29/2024 1800 Given 12.5 mg Oral Zena Hammer RN          sennosides (Senokot) tab 8.6 mg       Date Action Dose Route User    4/30/2024 0928 Given 8.6 mg Oral Zena Hammer RN          sodium chloride tab 1 g       Date Action Dose Route User    4/30/2024 1239 Given 1 g Oral Zena Hammer RN    4/30/2024 0623 Given 1 g Oral Raquel Toney RN    4/29/2024 2113 Given 1 g Oral Raquel Toney RN            Vitals (last day)       Date/Time Temp Pulse Resp BP SpO2 Weight O2 Device O2 Flow Rate (L/min) Who          CIWA Scores (since admission)       None            PLEASE FAX DAYS CERTIFIED AND NEXT REVIEW DATE -[phone]

## 2024-04-30 NOTE — PROGRESS NOTES
[facility]  part of [facility]  Progress Note    Nicky Nam Patient Status:  Inpatient    2005 MRN [id number]   Location [facility] 5SW/SE Attending Anthony Short MD   Hosp Day # 9 PCP Tu Yeung MD       Subjective:   No complaints    Objective:   Up in bathroom.     Blood pressure 106/75, pulse 109, temperature 98.8 °F (37.1 °C), temperature source Tympanic, resp. rate 18, height 59\", weight 94 lb 6.4 oz (42.8 kg), last menstrual period 2024, SpO2 100%.      Assessment and Plan:   Doing well, activity essentially back to baseline.  Going home today.  Will follow up with Dr Yosi Syed at [facility].       VALERIE AGUIAR, APRN  2024

## 2024-05-06 ENCOUNTER — LAB ENCOUNTER (OUTPATIENT)
Dept: LAB | Age: 19
End: 2024-05-06
Payer: COMMERCIAL

## 2024-05-06 DIAGNOSIS — E22.2 SYNDROME OF INAPPROPRIATE VASOPRESSIN SECRETION (HCC): ICD-10-CM

## 2024-05-06 LAB — SODIUM SERPL-SCNC: 140 MMOL/L (ref 136–145)

## 2024-05-06 PROCEDURE — 84295 ASSAY OF SERUM SODIUM: CPT

## 2024-05-06 PROCEDURE — 36415 COLL VENOUS BLD VENIPUNCTURE: CPT

## 2024-05-28 ENCOUNTER — LAB ENCOUNTER (OUTPATIENT)
Dept: LAB | Facility: HOSPITAL | Age: 19
End: 2024-05-28
Payer: COMMERCIAL

## 2024-05-28 DIAGNOSIS — E87.1 HYPONATREMIA SYNDROME: Primary | ICD-10-CM

## 2024-05-28 LAB
ANION GAP SERPL CALC-SCNC: 7 MMOL/L (ref 0–18)
BUN BLD-MCNC: 10 MG/DL (ref 9–23)
BUN/CREAT SERPL: 15.4 (ref 10–20)
CALCIUM BLD-MCNC: 9.2 MG/DL (ref 8.7–10.4)
CHLORIDE SERPL-SCNC: 109 MMOL/L (ref 98–112)
CO2 SERPL-SCNC: 26 MMOL/L (ref 21–32)
CREAT BLD-MCNC: 0.65 MG/DL
EGFRCR SERPLBLD CKD-EPI 2021: 129 ML/MIN/1.73M2 (ref 60–?)
FASTING STATUS PATIENT QL REPORTED: NO
GLUCOSE BLD-MCNC: 75 MG/DL (ref 70–99)
OSMOLALITY SERPL CALC.SUM OF ELEC: 292 MOSM/KG (ref 275–295)
POTASSIUM SERPL-SCNC: 4.4 MMOL/L (ref 3.5–5.1)
SODIUM SERPL-SCNC: 142 MMOL/L (ref 136–145)

## 2024-05-28 PROCEDURE — 36415 COLL VENOUS BLD VENIPUNCTURE: CPT

## 2024-05-28 PROCEDURE — 80048 BASIC METABOLIC PNL TOTAL CA: CPT

## 2024-10-07 ENCOUNTER — LAB ENCOUNTER (OUTPATIENT)
Dept: LAB | Facility: HOSPITAL | Age: 19
End: 2024-10-07
Attending: NURSE PRACTITIONER
Payer: COMMERCIAL

## 2024-10-07 DIAGNOSIS — I82.422: Primary | ICD-10-CM

## 2024-10-07 DIAGNOSIS — I82.521: ICD-10-CM

## 2024-10-07 DIAGNOSIS — Z79.01 ANTICOAGULATED ON WARFARIN: ICD-10-CM

## 2024-10-07 LAB
INR BLD: 2.33 (ref 0.8–1.2)
PROTHROMBIN TIME: 27 SECONDS (ref 11.6–14.8)

## 2024-10-07 PROCEDURE — 36415 COLL VENOUS BLD VENIPUNCTURE: CPT

## 2024-10-07 PROCEDURE — 85610 PROTHROMBIN TIME: CPT

## 2024-10-14 ENCOUNTER — LAB ENCOUNTER (OUTPATIENT)
Dept: LAB | Facility: HOSPITAL | Age: 19
End: 2024-10-14
Attending: NURSE PRACTITIONER
Payer: COMMERCIAL

## 2024-10-14 DIAGNOSIS — I82.521: ICD-10-CM

## 2024-10-14 DIAGNOSIS — E87.1 HYPONATREMIA: ICD-10-CM

## 2024-10-14 DIAGNOSIS — I82.422: Primary | ICD-10-CM

## 2024-10-14 DIAGNOSIS — Z79.01 ANTICOAGULATED ON WARFARIN: ICD-10-CM

## 2024-10-14 LAB
ANION GAP SERPL CALC-SCNC: 9 MMOL/L (ref 0–18)
BUN BLD-MCNC: 7 MG/DL (ref 9–23)
BUN/CREAT SERPL: 10.8 (ref 10–20)
CALCIUM BLD-MCNC: 9.5 MG/DL (ref 8.7–10.4)
CHLORIDE SERPL-SCNC: 109 MMOL/L (ref 98–112)
CO2 SERPL-SCNC: 24 MMOL/L (ref 21–32)
CREAT BLD-MCNC: 0.65 MG/DL
EGFRCR SERPLBLD CKD-EPI 2021: 129 ML/MIN/1.73M2 (ref 60–?)
FASTING STATUS PATIENT QL REPORTED: NO
GLUCOSE BLD-MCNC: 77 MG/DL (ref 70–99)
INR BLD: 3.25 (ref 0.8–1.2)
OSMOLALITY SERPL CALC.SUM OF ELEC: 291 MOSM/KG (ref 275–295)
OSMOLALITY UR: 226 MOSM/KG (ref 300–1300)
POTASSIUM SERPL-SCNC: 4.1 MMOL/L (ref 3.5–5.1)
PROTHROMBIN TIME: 35.1 SECONDS (ref 11.6–14.8)
SODIUM SERPL-SCNC: 142 MMOL/L (ref 136–145)

## 2024-10-14 PROCEDURE — 85610 PROTHROMBIN TIME: CPT

## 2024-10-14 PROCEDURE — 80048 BASIC METABOLIC PNL TOTAL CA: CPT

## 2024-10-14 PROCEDURE — 36415 COLL VENOUS BLD VENIPUNCTURE: CPT

## 2024-10-14 PROCEDURE — 83935 ASSAY OF URINE OSMOLALITY: CPT

## 2024-10-21 ENCOUNTER — LAB ENCOUNTER (OUTPATIENT)
Dept: LAB | Facility: HOSPITAL | Age: 19
End: 2024-10-21
Attending: NURSE PRACTITIONER
Payer: COMMERCIAL

## 2024-10-21 DIAGNOSIS — I82.422: ICD-10-CM

## 2024-10-21 DIAGNOSIS — I82.521: ICD-10-CM

## 2024-10-21 DIAGNOSIS — N92.1 PROLONGED MENSTRUATION: Primary | ICD-10-CM

## 2024-10-21 DIAGNOSIS — Z79.01 ANTICOAGULATED ON WARFARIN: ICD-10-CM

## 2024-10-21 LAB
HCT VFR BLD AUTO: 36.5 %
HGB BLD-MCNC: 12.6 G/DL
INR BLD: 3.54 (ref 0.8–1.2)
PROTHROMBIN TIME: 37.6 SECONDS (ref 11.6–14.8)

## 2024-10-21 PROCEDURE — 36415 COLL VENOUS BLD VENIPUNCTURE: CPT

## 2024-10-21 PROCEDURE — 85018 HEMOGLOBIN: CPT

## 2024-10-21 PROCEDURE — 85610 PROTHROMBIN TIME: CPT

## 2024-10-21 PROCEDURE — 85014 HEMATOCRIT: CPT

## 2024-10-30 ENCOUNTER — LAB ENCOUNTER (OUTPATIENT)
Dept: LAB | Facility: HOSPITAL | Age: 19
End: 2024-10-30
Attending: INTERNAL MEDICINE
Payer: COMMERCIAL

## 2024-10-30 DIAGNOSIS — E22.2 SYNDROME OF INAPPROPRIATE VASOPRESSIN SECRETION (HCC): ICD-10-CM

## 2024-10-30 DIAGNOSIS — I82.422: ICD-10-CM

## 2024-10-30 DIAGNOSIS — Z79.01 ANTICOAGULATED ON WARFARIN: ICD-10-CM

## 2024-10-30 DIAGNOSIS — I82.521: ICD-10-CM

## 2024-10-30 LAB
INR BLD: 1.36 (ref 0.8–1.2)
PROTHROMBIN TIME: 17.6 SECONDS (ref 11.6–14.8)
SODIUM SERPL-SCNC: 139 MMOL/L (ref 136–145)

## 2024-10-30 PROCEDURE — 36415 COLL VENOUS BLD VENIPUNCTURE: CPT

## 2024-10-30 PROCEDURE — 84295 ASSAY OF SERUM SODIUM: CPT

## 2024-10-30 PROCEDURE — 85610 PROTHROMBIN TIME: CPT

## 2024-11-06 ENCOUNTER — LAB ENCOUNTER (OUTPATIENT)
Dept: LAB | Facility: HOSPITAL | Age: 19
End: 2024-11-06
Attending: NURSE PRACTITIONER
Payer: COMMERCIAL

## 2024-11-06 DIAGNOSIS — Z79.01 ANTICOAGULATED ON WARFARIN: ICD-10-CM

## 2024-11-06 DIAGNOSIS — I82.521: ICD-10-CM

## 2024-11-06 DIAGNOSIS — I82.422: ICD-10-CM

## 2024-11-06 LAB
INR BLD: 2.75 (ref 0.8–1.2)
PROTHROMBIN TIME: 30.4 SECONDS (ref 11.6–14.8)

## 2024-11-06 PROCEDURE — 85610 PROTHROMBIN TIME: CPT

## 2024-11-06 PROCEDURE — 36415 COLL VENOUS BLD VENIPUNCTURE: CPT

## 2024-11-18 ENCOUNTER — LAB ENCOUNTER (OUTPATIENT)
Dept: LAB | Facility: HOSPITAL | Age: 19
End: 2024-11-18
Attending: NURSE PRACTITIONER
Payer: COMMERCIAL

## 2024-11-18 DIAGNOSIS — I82.521: ICD-10-CM

## 2024-11-18 DIAGNOSIS — E87.1 HYPOSMOLALITY SYNDROME: Primary | ICD-10-CM

## 2024-11-18 DIAGNOSIS — Z79.01 ANTICOAGULATED ON WARFARIN: ICD-10-CM

## 2024-11-18 DIAGNOSIS — I82.422: ICD-10-CM

## 2024-11-18 LAB
ANION GAP SERPL CALC-SCNC: 8 MMOL/L (ref 0–18)
BUN BLD-MCNC: 12 MG/DL (ref 9–23)
BUN/CREAT SERPL: 17.9 (ref 10–20)
CALCIUM BLD-MCNC: 9.8 MG/DL (ref 8.7–10.4)
CHLORIDE SERPL-SCNC: 108 MMOL/L (ref 98–112)
CO2 SERPL-SCNC: 25 MMOL/L (ref 21–32)
CREAT BLD-MCNC: 0.67 MG/DL
EGFRCR SERPLBLD CKD-EPI 2021: 128 ML/MIN/1.73M2 (ref 60–?)
FASTING STATUS PATIENT QL REPORTED: NO
GLUCOSE BLD-MCNC: 109 MG/DL (ref 70–99)
INR BLD: 2.77 (ref 0.8–1.2)
OSMOLALITY SERPL CALC.SUM OF ELEC: 292 MOSM/KG (ref 275–295)
OSMOLALITY UR: 563 MOSM/KG (ref 300–1300)
POTASSIUM SERPL-SCNC: 4 MMOL/L (ref 3.5–5.1)
PROTHROMBIN TIME: 30.6 SECONDS (ref 11.6–14.8)
SODIUM SERPL-SCNC: 141 MMOL/L (ref 136–145)

## 2024-11-18 PROCEDURE — 80048 BASIC METABOLIC PNL TOTAL CA: CPT

## 2024-11-18 PROCEDURE — 83935 ASSAY OF URINE OSMOLALITY: CPT

## 2024-11-18 PROCEDURE — 36415 COLL VENOUS BLD VENIPUNCTURE: CPT

## 2024-11-18 PROCEDURE — 85610 PROTHROMBIN TIME: CPT

## 2024-12-09 ENCOUNTER — LAB ENCOUNTER (OUTPATIENT)
Dept: LAB | Facility: HOSPITAL | Age: 19
End: 2024-12-09
Attending: NURSE PRACTITIONER
Payer: COMMERCIAL

## 2024-12-09 DIAGNOSIS — I82.422: ICD-10-CM

## 2024-12-09 DIAGNOSIS — I82.521: ICD-10-CM

## 2024-12-09 DIAGNOSIS — E87.1 HYPONATREMIA: Primary | ICD-10-CM

## 2024-12-09 DIAGNOSIS — Z79.01 ANTICOAGULATED ON WARFARIN: ICD-10-CM

## 2024-12-09 LAB
ANION GAP SERPL CALC-SCNC: 8 MMOL/L (ref 0–18)
BUN BLD-MCNC: 9 MG/DL (ref 9–23)
BUN/CREAT SERPL: 14.3 (ref 10–20)
CALCIUM BLD-MCNC: 9.4 MG/DL (ref 8.7–10.4)
CHLORIDE SERPL-SCNC: 103 MMOL/L (ref 98–112)
CO2 SERPL-SCNC: 24 MMOL/L (ref 21–32)
CREAT BLD-MCNC: 0.63 MG/DL
EGFRCR SERPLBLD CKD-EPI 2021: 130 ML/MIN/1.73M2 (ref 60–?)
FASTING STATUS PATIENT QL REPORTED: NO
GLUCOSE BLD-MCNC: 100 MG/DL (ref 70–99)
INR BLD: 2.88 (ref 0.8–1.2)
OSMOLALITY SERPL CALC.SUM OF ELEC: 279 MOSM/KG (ref 275–295)
POTASSIUM SERPL-SCNC: 3.8 MMOL/L (ref 3.5–5.1)
PROTHROMBIN TIME: 31.5 SECONDS (ref 11.6–14.8)
SODIUM SERPL-SCNC: 135 MMOL/L (ref 136–145)

## 2024-12-09 PROCEDURE — 36415 COLL VENOUS BLD VENIPUNCTURE: CPT

## 2024-12-09 PROCEDURE — 85610 PROTHROMBIN TIME: CPT

## 2024-12-09 PROCEDURE — 80048 BASIC METABOLIC PNL TOTAL CA: CPT

## 2025-01-07 ENCOUNTER — LAB ENCOUNTER (OUTPATIENT)
Dept: LAB | Facility: HOSPITAL | Age: 20
End: 2025-01-07
Attending: NURSE PRACTITIONER
Payer: COMMERCIAL

## 2025-01-07 DIAGNOSIS — I82.422: ICD-10-CM

## 2025-01-07 DIAGNOSIS — I82.521: ICD-10-CM

## 2025-01-07 DIAGNOSIS — E87.1 HYPONATREMIA: Primary | ICD-10-CM

## 2025-01-07 DIAGNOSIS — Z79.01 ANTICOAGULATED ON WARFARIN: ICD-10-CM

## 2025-01-07 LAB
ANION GAP SERPL CALC-SCNC: 8 MMOL/L (ref 0–18)
BUN BLD-MCNC: 10 MG/DL (ref 9–23)
BUN/CREAT SERPL: 13.7 (ref 10–20)
CALCIUM BLD-MCNC: 9.8 MG/DL (ref 8.7–10.4)
CHLORIDE SERPL-SCNC: 102 MMOL/L (ref 98–112)
CO2 SERPL-SCNC: 25 MMOL/L (ref 21–32)
CREAT BLD-MCNC: 0.73 MG/DL
EGFRCR SERPLBLD CKD-EPI 2021: 121 ML/MIN/1.73M2 (ref 60–?)
FASTING STATUS PATIENT QL REPORTED: NO
GLUCOSE BLD-MCNC: 106 MG/DL (ref 70–99)
INR BLD: 2.7 (ref 0.8–1.2)
OSMOLALITY SERPL CALC.SUM OF ELEC: 279 MOSM/KG (ref 275–295)
POTASSIUM SERPL-SCNC: 3.7 MMOL/L (ref 3.5–5.1)
PROTHROMBIN TIME: 30 SECONDS (ref 11.6–14.8)
SODIUM SERPL-SCNC: 135 MMOL/L (ref 136–145)

## 2025-01-07 PROCEDURE — 80048 BASIC METABOLIC PNL TOTAL CA: CPT

## 2025-01-07 PROCEDURE — 85610 PROTHROMBIN TIME: CPT

## 2025-01-07 PROCEDURE — 36415 COLL VENOUS BLD VENIPUNCTURE: CPT

## 2025-01-07 PROCEDURE — 83935 ASSAY OF URINE OSMOLALITY: CPT

## 2025-01-08 ENCOUNTER — LAB ENCOUNTER (OUTPATIENT)
Dept: LAB | Facility: HOSPITAL | Age: 20
End: 2025-01-08
Payer: COMMERCIAL

## 2025-01-08 DIAGNOSIS — E87.1 HYPONATREMIA: ICD-10-CM

## 2025-01-08 LAB — OSMOLALITY UR: 351 MOSM/KG (ref 300–1300)

## 2025-01-15 ENCOUNTER — LAB ENCOUNTER (OUTPATIENT)
Dept: LAB | Facility: HOSPITAL | Age: 20
End: 2025-01-15
Attending: NURSE PRACTITIONER
Payer: COMMERCIAL

## 2025-01-15 DIAGNOSIS — Z79.01 ANTICOAGULATED ON WARFARIN: ICD-10-CM

## 2025-01-15 DIAGNOSIS — I82.422: ICD-10-CM

## 2025-01-15 DIAGNOSIS — I82.521: ICD-10-CM

## 2025-01-15 LAB
INR BLD: 3.62 (ref 0.8–1.2)
PROTHROMBIN TIME: 37.7 SECONDS (ref 11.6–14.8)

## 2025-01-15 PROCEDURE — 85610 PROTHROMBIN TIME: CPT

## 2025-01-15 PROCEDURE — 36415 COLL VENOUS BLD VENIPUNCTURE: CPT

## 2025-01-22 ENCOUNTER — LAB ENCOUNTER (OUTPATIENT)
Dept: LAB | Facility: HOSPITAL | Age: 20
End: 2025-01-22
Payer: COMMERCIAL

## 2025-01-22 DIAGNOSIS — E87.1 HYPONATREMIA: ICD-10-CM

## 2025-01-22 DIAGNOSIS — Z79.01 WARFARIN ANTICOAGULATION: ICD-10-CM

## 2025-01-22 DIAGNOSIS — N92.1 MENORRHAGIA WITH IRREGULAR CYCLE: Primary | ICD-10-CM

## 2025-01-22 LAB
ANION GAP SERPL CALC-SCNC: 8 MMOL/L (ref 0–18)
BASOPHILS # BLD AUTO: 0.02 X10(3) UL (ref 0–0.2)
BASOPHILS NFR BLD AUTO: 0.7 %
BUN BLD-MCNC: 9 MG/DL (ref 9–23)
BUN/CREAT SERPL: 12.3 (ref 10–20)
CALCIUM BLD-MCNC: 9.4 MG/DL (ref 8.7–10.4)
CHLORIDE SERPL-SCNC: 107 MMOL/L (ref 98–112)
CO2 SERPL-SCNC: 24 MMOL/L (ref 21–32)
CREAT BLD-MCNC: 0.73 MG/DL
DEPRECATED RDW RBC AUTO: 45.3 FL (ref 35.1–46.3)
EGFRCR SERPLBLD CKD-EPI 2021: 121 ML/MIN/1.73M2 (ref 60–?)
EOSINOPHIL # BLD AUTO: 0.06 X10(3) UL (ref 0–0.7)
EOSINOPHIL NFR BLD AUTO: 2 %
ERYTHROCYTE [DISTWIDTH] IN BLOOD BY AUTOMATED COUNT: 13.6 % (ref 11–15)
FASTING STATUS PATIENT QL REPORTED: NO
GLUCOSE BLD-MCNC: 90 MG/DL (ref 70–99)
HCT VFR BLD AUTO: 36.1 %
HGB BLD-MCNC: 11.9 G/DL
IMM GRANULOCYTES # BLD AUTO: 0.02 X10(3) UL (ref 0–1)
IMM GRANULOCYTES NFR BLD: 0.7 %
INR BLD: 2.68 (ref 0.8–1.2)
LYMPHOCYTES # BLD AUTO: 0.76 X10(3) UL (ref 1–4)
LYMPHOCYTES NFR BLD AUTO: 25.3 %
MCH RBC QN AUTO: 29.8 PG (ref 26–34)
MCHC RBC AUTO-ENTMCNC: 33 G/DL (ref 31–37)
MCV RBC AUTO: 90.3 FL
MONOCYTES # BLD AUTO: 0.4 X10(3) UL (ref 0.1–1)
MONOCYTES NFR BLD AUTO: 13.3 %
NEUTROPHILS # BLD AUTO: 1.74 X10 (3) UL (ref 1.5–7.7)
NEUTROPHILS # BLD AUTO: 1.74 X10(3) UL (ref 1.5–7.7)
NEUTROPHILS NFR BLD AUTO: 58 %
OSMOLALITY SERPL CALC.SUM OF ELEC: 286 MOSM/KG (ref 275–295)
PLATELET # BLD AUTO: 247 10(3)UL (ref 150–450)
POTASSIUM SERPL-SCNC: 4 MMOL/L (ref 3.5–5.1)
PROTHROMBIN TIME: 29.8 SECONDS (ref 11.6–14.8)
RBC # BLD AUTO: 4 X10(6)UL
SODIUM SERPL-SCNC: 139 MMOL/L (ref 136–145)
WBC # BLD AUTO: 3 X10(3) UL (ref 4–11)

## 2025-01-22 PROCEDURE — 80048 BASIC METABOLIC PNL TOTAL CA: CPT

## 2025-01-22 PROCEDURE — 36415 COLL VENOUS BLD VENIPUNCTURE: CPT

## 2025-01-22 PROCEDURE — 85610 PROTHROMBIN TIME: CPT

## 2025-01-22 PROCEDURE — 85025 COMPLETE CBC W/AUTO DIFF WBC: CPT

## 2025-02-05 ENCOUNTER — LAB ENCOUNTER (OUTPATIENT)
Dept: LAB | Facility: HOSPITAL | Age: 20
End: 2025-02-05
Payer: COMMERCIAL

## 2025-02-05 DIAGNOSIS — Z79.01 ANTICOAGULATED ON WARFARIN: ICD-10-CM

## 2025-02-05 DIAGNOSIS — I82.521: ICD-10-CM

## 2025-02-05 DIAGNOSIS — I82.422: ICD-10-CM

## 2025-02-05 LAB
INR BLD: 3.14 (ref 0.8–1.2)
PROTHROMBIN TIME: 33.8 SECONDS (ref 11.6–14.8)

## 2025-02-05 PROCEDURE — 36415 COLL VENOUS BLD VENIPUNCTURE: CPT

## 2025-02-05 PROCEDURE — 85610 PROTHROMBIN TIME: CPT

## 2025-02-18 ENCOUNTER — LAB ENCOUNTER (OUTPATIENT)
Dept: LAB | Facility: HOSPITAL | Age: 20
End: 2025-02-18
Attending: NURSE PRACTITIONER
Payer: COMMERCIAL

## 2025-02-18 DIAGNOSIS — Z79.01 WARFARIN ANTICOAGULATION: ICD-10-CM

## 2025-02-18 DIAGNOSIS — N92.1 MENORRHAGIA WITH IRREGULAR CYCLE: ICD-10-CM

## 2025-02-18 LAB
INR BLD: 3.17 (ref 0.8–1.2)
PROTHROMBIN TIME: 34 SECONDS (ref 11.6–14.8)

## 2025-02-18 PROCEDURE — 85610 PROTHROMBIN TIME: CPT

## 2025-02-18 PROCEDURE — 36415 COLL VENOUS BLD VENIPUNCTURE: CPT

## 2025-02-25 ENCOUNTER — OFFICE VISIT (OUTPATIENT)
Dept: OTOLARYNGOLOGY | Facility: CLINIC | Age: 20
End: 2025-02-25
Payer: COMMERCIAL

## 2025-02-25 DIAGNOSIS — H92.22 OTORRHAGIA OF LEFT EAR: Primary | ICD-10-CM

## 2025-02-25 PROCEDURE — 99203 OFFICE O/P NEW LOW 30 MIN: CPT | Performed by: OTOLARYNGOLOGY

## 2025-02-25 PROCEDURE — 92504 EAR MICROSCOPY EXAMINATION: CPT | Performed by: OTOLARYNGOLOGY

## 2025-02-26 NOTE — PROGRESS NOTES
Nicky Nam is a 19 year old female.    Chief Complaint   Patient presents with    Ear Problem     Left ear bleeding X 1 week   Pt does use hearing aids       HISTORY OF PRESENT ILLNESS  She presents with a history of having some blood from the left ear.  Mom states that she has she noted some bright red blood at 1 point on that side more recently she noted scabs coming out.  She uses an otoscope to examine her ear at times and did note some old debris in the ear on the left superiorly.  She does admit to using hearing aids in the past but none since the bleeding started does admit to cleaning her ears with Q-tips around the time that this bleeding did start.  No other signs, symptoms or complaints.  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      Social History     Socioeconomic History    Marital status: Single   Tobacco Use    Smoking status: Never    Smokeless tobacco: Never    Tobacco comments:     No household smokers.    Vaping Use    Vaping status: Never Used       History reviewed. No pertinent family history.    Past Medical History:    Celiac disease (HCC)    Epilepsy (HCC)    Hearing loss    Hernia, diaphragmatic, congenital (HCC)    Hip dysplasia (HCC)    Mast cell activation (HCC)    Seizure disorder (HCC)    Urinary reflux    Vesicoureteral reflux       Past Surgical History:   Procedure Laterality Date    Other surgical history      diaphram hernia repair and uretral repair          REVIEW OF SYSTEMS    System Neg/Pos Details   Constitutional Negative Fatigue, fever and weight loss.   ENMT Negative Drooling.   Eyes Negative Blurred vision and vision changes.   Respiratory Negative Dyspnea and wheezing.   Cardio Negative Chest pain, irregular heartbeat/palpitations and syncope.   GI Negative Abdominal pain and diarrhea.   Endocrine Negative Cold intolerance and heat intolerance.   Neuro Negative Tremors.   Psych Negative Anxiety and depression.   Integumentary Negative Frequent skin  infections, pigment change and rash.   Hema/Lymph Negative Easy bleeding and easy bruising.           PHYSICAL EXAM    LMP 04/14/2024 (Approximate)        Constitutional Normal Overall appearance - Normal.   Psychiatric Normal Orientation - Oriented to time, place, person & situation. Appropriate mood and affect.   Neck Exam Normal Inspection - Normal. Palpation - Normal. Parotid gland - Normal. Thyroid gland - Normal.   Eyes Normal Conjunctiva - Right: Normal, Left: Normal. Pupil - Right: Normal, Left: Normal. Fundus - Right: Normal, Left: Normal.   Neurological Normal Memory - Normal. Cranial nerves - Cranial nerves II through XII grossly intact.   Head/Face Normal Facial features - Normal. Eyebrows - Normal. Skull - Normal.        Nasopharynx Normal External nose - Normal. Lips/teeth/gums - Normal. Tonsils - Normal. Oropharynx - Normal.   Ears Normal Inspection - Right: Normal, Left: Normal. Canal - Right: Normal, Left: Old blood in canal TM - Right: Normal, Left: Normal.   Skin Normal Inspection - Normal.        Lymph Detail Normal Submental. Submandibular. Anterior cervical. Posterior cervical. Supraclavicular.        Nose/Mouth/Throat Normal External nose - Normal. Lips/teeth/gums - Normal. Tonsils - Normal. Oropharynx - Normal.   Nose/Mouth/Throat Normal Nares - Right: Normal Left: Normal. Septum -Normal  Turbinates - Right: Normal, Left: Normal.   Microscopy  Binocular microscopy was performed. The affected ear(s) was/were examined and all debris removed using suction. The findings are described in the physical exam.   Old blood scabs removed from the left ear canal laterally.  She does have a firm scab on the medial aspect of the inferior canal on the left side.  Tympanic membrane intact no perforation.  Right side completely normal.    Current Outpatient Medications:     metoprolol tartrate 25 MG Oral Tab, Take 0.5 tablets (12.5 mg total) by mouth 2x Daily(Beta Blocker)., Disp: 30 tablet, Rfl: 0     fexofenadine 180 MG Oral Tab, Take 1 tablet (180 mg total) by mouth 2 (two) times daily. Mast cell, Disp: , Rfl:     famotidine 20 MG Oral Tab, Take 1 tablet (20 mg total) by mouth 2 (two) times daily., Disp: , Rfl:     sodium chloride 1 g Oral Tab, Take 1 tablet (1 g total) by mouth 3 (three) times daily with meals., Disp: , Rfl:     cetirizine 10 MG Oral Tab, Take 1 tablet (10 mg total) by mouth as needed for Allergies., Disp: , Rfl:     Cholecalciferol (VITAMIN D) 50 MCG (2000 UT) Oral Cap, Take 1 capsule (2,000 Units total) by mouth daily., Disp: , Rfl:     NON FORMULARY, Take 180 mg by mouth in the morning and 180 mg before bedtime. Allegra non-drowsy 180 mg BID ., Disp: , Rfl:     EPINEPHrine (EPIPEN 2-KARTHIK) 0.3 MG/0.3ML Injection Solution Auto-injector, Inject IM in event of  allergic reaction. Dispense twin pack, ok to dispense generic Mylan if ok with parent, Disp: 2 each, Rfl: 0    CarBAMazepine  MG Oral Capsule SR 12 Hr, , Disp: , Rfl: 8    diazepam 10 MG Rectal Gel, I 7.5 MG RECTALLY PRF SEIZURE, Disp: , Rfl: 0    FLUVIRIN Intramuscular Suspension, ADM 0.5ML IM UTD, Disp: , Rfl: 0    enoxaparin 40 MG/0.4ML Injection Solution Prefilled Syringe, Inject 0.43 mL (43 mg total) into the skin every 12 (twelve) hours., Disp: 30 each, Rfl: 0    traMADol 50 MG Oral Tab, Take 1 tablet (50 mg total) by mouth every 6 (six) hours as needed., Disp: 20 tablet, Rfl: 0  ASSESSMENT AND PLAN    1. Otorrhagia of left ear  I did remove some old blood from her ear canal and she does have a scab just medial to the tympanic membrane inferiorly.  No other canal injuries noted.  I do suspect some type of mechanical injury which may be related to use of Q-tips.  I did ask her to take caution with the use of Q-tips in light of her use of warfarin.  At this time no further management indicated and she will return to see me on appearing basis.        This note was prepared using Dragon Medical voice recognition dictation  software. As a result errors may occur. When identified these errors have been corrected. While every attempt is made to correct errors during dictation discrepancies may still exist    Jones Vargas MD    2/26/2025    12:45 PM

## 2025-03-17 ENCOUNTER — LAB ENCOUNTER (OUTPATIENT)
Dept: LAB | Facility: HOSPITAL | Age: 20
End: 2025-03-17
Attending: NURSE PRACTITIONER
Payer: COMMERCIAL

## 2025-03-17 DIAGNOSIS — N92.1 METRORRHAGIA: ICD-10-CM

## 2025-03-17 DIAGNOSIS — Z79.01 WARFARIN ANTICOAGULATION: ICD-10-CM

## 2025-03-17 DIAGNOSIS — E87.1 HYPOSMOLALITY SYNDROME: Primary | ICD-10-CM

## 2025-03-17 DIAGNOSIS — N92.1 MENORRHAGIA WITH IRREGULAR CYCLE: ICD-10-CM

## 2025-03-17 LAB
ANION GAP SERPL CALC-SCNC: 8 MMOL/L (ref 0–18)
BASOPHILS # BLD AUTO: 0.03 X10(3) UL (ref 0–0.2)
BASOPHILS NFR BLD AUTO: 0.6 %
BUN BLD-MCNC: 5 MG/DL (ref 9–23)
BUN/CREAT SERPL: 7.5 (ref 10–20)
CALCIUM BLD-MCNC: 8.8 MG/DL (ref 8.7–10.4)
CHLORIDE SERPL-SCNC: 103 MMOL/L (ref 98–112)
CO2 SERPL-SCNC: 24 MMOL/L (ref 21–32)
CREAT BLD-MCNC: 0.67 MG/DL
DEPRECATED RDW RBC AUTO: 43.9 FL (ref 35.1–46.3)
EGFRCR SERPLBLD CKD-EPI 2021: 128 ML/MIN/1.73M2 (ref 60–?)
EOSINOPHIL # BLD AUTO: 0.04 X10(3) UL (ref 0–0.7)
EOSINOPHIL NFR BLD AUTO: 0.8 %
ERYTHROCYTE [DISTWIDTH] IN BLOOD BY AUTOMATED COUNT: 13.5 % (ref 11–15)
FASTING STATUS PATIENT QL REPORTED: YES
GLUCOSE BLD-MCNC: 93 MG/DL (ref 70–99)
HCT VFR BLD AUTO: 35.6 %
HGB BLD-MCNC: 11.8 G/DL
IMM GRANULOCYTES # BLD AUTO: 0.03 X10(3) UL (ref 0–1)
IMM GRANULOCYTES NFR BLD: 0.6 %
INR BLD: 2.85 (ref 0.8–1.2)
LYMPHOCYTES # BLD AUTO: 0.51 X10(3) UL (ref 1–4)
LYMPHOCYTES NFR BLD AUTO: 10.5 %
MCH RBC QN AUTO: 29.1 PG (ref 26–34)
MCHC RBC AUTO-ENTMCNC: 33.1 G/DL (ref 31–37)
MCV RBC AUTO: 87.9 FL
MONOCYTES # BLD AUTO: 0.8 X10(3) UL (ref 0.1–1)
MONOCYTES NFR BLD AUTO: 16.4 %
NEUTROPHILS # BLD AUTO: 3.47 X10 (3) UL (ref 1.5–7.7)
NEUTROPHILS # BLD AUTO: 3.47 X10(3) UL (ref 1.5–7.7)
NEUTROPHILS NFR BLD AUTO: 71.1 %
OSMOLALITY SERPL CALC.SUM OF ELEC: 277 MOSM/KG (ref 275–295)
PLATELET # BLD AUTO: 272 10(3)UL (ref 150–450)
POTASSIUM SERPL-SCNC: 4.1 MMOL/L (ref 3.5–5.1)
PROTHROMBIN TIME: 31.3 SECONDS (ref 11.6–14.8)
RBC # BLD AUTO: 4.05 X10(6)UL
SODIUM SERPL-SCNC: 135 MMOL/L (ref 136–145)
WBC # BLD AUTO: 4.9 X10(3) UL (ref 4–11)

## 2025-03-17 PROCEDURE — 36415 COLL VENOUS BLD VENIPUNCTURE: CPT

## 2025-03-17 PROCEDURE — 85610 PROTHROMBIN TIME: CPT

## 2025-03-17 PROCEDURE — 80048 BASIC METABOLIC PNL TOTAL CA: CPT

## 2025-03-17 PROCEDURE — 85025 COMPLETE CBC W/AUTO DIFF WBC: CPT

## 2025-03-26 ENCOUNTER — LAB ENCOUNTER (OUTPATIENT)
Dept: LAB | Facility: HOSPITAL | Age: 20
End: 2025-03-26
Payer: COMMERCIAL

## 2025-03-26 DIAGNOSIS — E87.1 HYPONATREMIA: Primary | ICD-10-CM

## 2025-03-26 DIAGNOSIS — Z79.01 WARFARIN ANTICOAGULATION: ICD-10-CM

## 2025-03-26 DIAGNOSIS — N92.1 MENORRHAGIA WITH IRREGULAR CYCLE: ICD-10-CM

## 2025-03-26 LAB
ANION GAP SERPL CALC-SCNC: 8 MMOL/L (ref 0–18)
BUN BLD-MCNC: 10 MG/DL (ref 9–23)
BUN/CREAT SERPL: 15.9 (ref 10–20)
CALCIUM BLD-MCNC: 8.8 MG/DL (ref 8.7–10.4)
CHLORIDE SERPL-SCNC: 99 MMOL/L (ref 98–112)
CO2 SERPL-SCNC: 25 MMOL/L (ref 21–32)
CREAT BLD-MCNC: 0.63 MG/DL
EGFRCR SERPLBLD CKD-EPI 2021: 130 ML/MIN/1.73M2 (ref 60–?)
FASTING STATUS PATIENT QL REPORTED: NO
GLUCOSE BLD-MCNC: 86 MG/DL (ref 70–99)
INR BLD: 4.43 (ref 0.8–1.2)
OSMOLALITY SERPL CALC.SUM OF ELEC: 272 MOSM/KG (ref 275–295)
POTASSIUM SERPL-SCNC: 4.1 MMOL/L (ref 3.5–5.1)
PROTHROMBIN TIME: 44.2 SECONDS (ref 11.6–14.8)
SODIUM SERPL-SCNC: 132 MMOL/L (ref 136–145)

## 2025-03-26 PROCEDURE — 85610 PROTHROMBIN TIME: CPT

## 2025-03-26 PROCEDURE — 36415 COLL VENOUS BLD VENIPUNCTURE: CPT

## 2025-03-26 PROCEDURE — 80048 BASIC METABOLIC PNL TOTAL CA: CPT

## 2025-04-07 ENCOUNTER — LAB ENCOUNTER (OUTPATIENT)
Dept: LAB | Facility: HOSPITAL | Age: 20
End: 2025-04-07
Attending: NURSE PRACTITIONER
Payer: COMMERCIAL

## 2025-04-07 DIAGNOSIS — I82.422: ICD-10-CM

## 2025-04-07 DIAGNOSIS — Z79.01 ANTICOAGULATED ON WARFARIN: ICD-10-CM

## 2025-04-07 DIAGNOSIS — I82.521: ICD-10-CM

## 2025-04-07 LAB
INR BLD: 3.75 (ref 0.8–1.2)
PROTHROMBIN TIME: 38.8 SECONDS (ref 11.6–14.8)

## 2025-04-07 PROCEDURE — 36415 COLL VENOUS BLD VENIPUNCTURE: CPT

## 2025-04-07 PROCEDURE — 85610 PROTHROMBIN TIME: CPT

## 2025-04-12 ENCOUNTER — APPOINTMENT (OUTPATIENT)
Dept: GENERAL RADIOLOGY | Age: 20
End: 2025-04-12
Attending: NURSE PRACTITIONER
Payer: COMMERCIAL

## 2025-04-12 ENCOUNTER — HOSPITAL ENCOUNTER (OUTPATIENT)
Age: 20
Discharge: HOME | End: 2025-04-12
Payer: COMMERCIAL

## 2025-04-12 VITALS
DIASTOLIC BLOOD PRESSURE: 79 MMHG | SYSTOLIC BLOOD PRESSURE: 120 MMHG | RESPIRATION RATE: 18 BRPM | HEART RATE: 92 BPM | TEMPERATURE: 98 F | OXYGEN SATURATION: 100 %

## 2025-04-12 DIAGNOSIS — S96.911A STRAIN OF RIGHT FOOT, INITIAL ENCOUNTER: ICD-10-CM

## 2025-04-12 DIAGNOSIS — S96.911A STRAIN OF RIGHT ANKLE, INITIAL ENCOUNTER: Primary | ICD-10-CM

## 2025-04-12 PROCEDURE — 73610 X-RAY EXAM OF ANKLE: CPT | Performed by: NURSE PRACTITIONER

## 2025-04-12 PROCEDURE — 99214 OFFICE O/P EST MOD 30 MIN: CPT

## 2025-04-12 PROCEDURE — 73630 X-RAY EXAM OF FOOT: CPT | Performed by: NURSE PRACTITIONER

## 2025-04-12 PROCEDURE — 99213 OFFICE O/P EST LOW 20 MIN: CPT

## 2025-04-12 RX ORDER — WARFARIN SODIUM 2.5 MG/1
TABLET ORAL
COMMUNITY

## 2025-04-12 NOTE — ED PROVIDER NOTES
Patient Seen in: [facility]    History     Chief Complaint   Patient presents with    Leg or Foot Injury     Entered by patient     Stated Complaint: Leg or Foot Injury    Subjective:   HPI    19-year-old female presents with complaints of right ankle and foot pain.  She denies specific injury but mom her states patient has history of osteopenia.  Patient states she was bowling yesterday wearing a tight shoe and today woke with pain in her foot and ankle.      Objective:     Past Medical History:    Celiac disease (HCC)    Epilepsy (HCC)    Hearing loss    Hernia, diaphragmatic, congenital (HCC)    Hip dysplasia (HCC)    Mast cell activation (HCC)    Seizure disorder (HCC)    Urinary reflux    Vesicoureteral reflux              Past Surgical History:   Procedure Laterality Date    Other surgical history      diaphram hernia repair and uretral repair                 Social History     Socioeconomic History    Marital status: Single   Tobacco Use    Smoking status: Never    Smokeless tobacco: Never    Tobacco comments:     No household smokers.    Vaping Use    Vaping status: Never Used     Social Drivers of Health     Food Insecurity: Low Risk  (1/10/2025)    Received from [facility]    Food Insecurity     Have there been times that your food ran out, and you didn't have money to get more?: No     Are there times that you worry that this might happen?: No   Transportation Needs: Low Risk  (1/10/2025)    Received from [facility]    Transportation Needs     Do you have trouble getting transportation to medical appointments?: No     How do you normally get to and from your appointments?: Family/Friend              Review of Systems    Positive for stated complaint: Leg or Foot Injury  Other systems are as noted in HPI.  Constitutional and vital signs reviewed.      All other systems reviewed and negative except as noted above.    Physical Exam     ED Triage  Vitals [04/12/25 1414]   /79   Pulse 92   Resp 18   Temp 98.1 °F (36.7 °C)   Temp src Oral   SpO2 100 %   O2 Device None (Room air)       Current Vitals:   Vital Signs  BP: 120/79  Pulse: 92  Resp: 18  Temp: 98.1 °F (36.7 °C)  Temp src: Oral    Oxygen Therapy  SpO2: 100 %  O2 Device: None (Room air)        Physical Exam  Vitals reviewed.   Constitutional:       General: She is not in acute distress.  Cardiovascular:      Rate and Rhythm: Normal rate and regular rhythm.   Pulmonary:      Effort: Pulmonary effort is normal.      Breath sounds: Normal breath sounds.   Musculoskeletal:         General: Tenderness present. No signs of injury.        Feet:    Feet:      Comments: + tenderness R lateral malleolus and R lateral foot.  Neg swelling, neg deformity  Neurological:      General: No focal deficit present.      Mental Status: She is alert and oriented to person, place, and time.   Psychiatric:         Mood and Affect: Mood normal.         Behavior: Behavior normal.             ED Course   Labs Reviewed - No data to display         XR FOOT, COMPLETE (MIN 3 VIEWS), RIGHT (CPT=73630)          PROCEDURE: XR FOOT, COMPLETE (MIN 3 VIEWS), RIGHT (CPT=73630)     COMPARISON: [facility], XR ANKLE (MIN 3 VIEWS), RIGHT (CPT=73610), 4/12/2025, 2:23 PM.     INDICATIONS: Right generalized foot pain x 1 day. No known injury.     TECHNIQUE: 3 views were obtained.       FINDINGS:   BONES: Normal. No significant arthropathy, fracture or acute abnormality.  SOFT TISSUES: Negative. No visible soft tissue swelling.   EFFUSION: None visible.   OTHER: Negative.               =====  CONCLUSION: No acute fracture or dislocation.           Dictated by (CST): Sagar Smith MD on 4/12/2025 at 2:49 PM       Finalized by (CST): Sagar Smith MD on 4/12/2025 at 2:50 PM                 XR ANKLE (MIN 3 VIEWS), RIGHT (CPT=73610)          PROCEDURE: XR ANKLE (MIN 3 VIEWS), RIGHT (CPT=73610)      COMPARISON: None.     INDICATIONS: Right generalized ankle pain x 1 day. No known injury.     TECHNIQUE: 3 views were obtained.       FINDINGS:   BONES: Normal. No significant arthropathy, fracture or acute abnormality.  SOFT TISSUES: Negative. No visible soft tissue swelling.   EFFUSION: None visible.   OTHER: Negative.               =====  CONCLUSION: No acute fracture or dislocation.           Dictated by (CST): Sagar Smith MD on 4/12/2025 at 2:50 PM       Finalized by (CST): Sagar Smith MD on 4/12/2025 at 2:50 PM                                      MDM              Medical Decision Making  19-year-old female presents with complaints of right ankle and right foot pain.  Differential diagnosis includes ankle sprain, ankle fracture, foot sprain, foot fracture, muscle skeletal contusion.  Patient denies specific injury but states she has a history of osteopenia and was concerned she may have an occult fracture.  X-rays were done of both right ankle and right foot and shows no fracture.  These findings were discussed with patient.  They are instructed to follow-up with their primary if there continues to be pain.    Amount and/or Complexity of Data Reviewed  Radiology: ordered.     Details: R ankle xray images reviewed by IC provider.  Shows no FX  R foot xray images reviewed by IC provider.  Shows no FX    Risk  OTC drugs.        Disposition and Plan     Clinical Impression:  1. Strain of right ankle, initial encounter    2. Strain of right foot, initial encounter         Disposition:  Discharge  4/12/2025  2:56 pm    Follow-up:  Tu Yeung MD  [address] [place] [id number] [phone]      If symptoms worsen          Medications Prescribed:  Discharge Medication List as of 4/12/2025  2:57 PM          Supplementary Documentation:

## 2025-04-12 NOTE — ED INITIAL ASSESSMENT (HPI)
Pt presents to the IC with c/o atraumatic right ankle and foot pain since this morning. Hx of osteopenia. +tenderness. No obvious deformity to the ankle or foot. Pt was bowling yesterday and wearing tighter shoes but did not have pain until today.

## 2025-04-14 ENCOUNTER — LAB ENCOUNTER (OUTPATIENT)
Dept: LAB | Facility: HOSPITAL | Age: 20
End: 2025-04-14
Attending: NURSE PRACTITIONER
Payer: COMMERCIAL

## 2025-04-14 DIAGNOSIS — Z79.01 ANTICOAGULATED ON WARFARIN: ICD-10-CM

## 2025-04-14 DIAGNOSIS — I82.422: ICD-10-CM

## 2025-04-14 DIAGNOSIS — I82.521: ICD-10-CM

## 2025-04-14 LAB
INR BLD: 2.4 (ref 0.8–1.2)
PROTHROMBIN TIME: 27.4 SECONDS (ref 11.6–14.8)

## 2025-04-14 PROCEDURE — 85610 PROTHROMBIN TIME: CPT

## 2025-04-14 PROCEDURE — 36415 COLL VENOUS BLD VENIPUNCTURE: CPT

## 2025-04-29 ENCOUNTER — LAB ENCOUNTER (OUTPATIENT)
Dept: LAB | Facility: HOSPITAL | Age: 20
End: 2025-04-29
Payer: COMMERCIAL

## 2025-04-29 ENCOUNTER — OFFICE VISIT (OUTPATIENT)
Dept: OTOLARYNGOLOGY | Facility: CLINIC | Age: 20
End: 2025-04-29
Payer: COMMERCIAL

## 2025-04-29 VITALS — HEIGHT: 59 IN | WEIGHT: 110 LBS | BODY MASS INDEX: 22.18 KG/M2

## 2025-04-29 DIAGNOSIS — N92.1 MENORRHAGIA WITH IRREGULAR CYCLE: ICD-10-CM

## 2025-04-29 DIAGNOSIS — E87.1 HYPOSMOLALITY SYNDROME: ICD-10-CM

## 2025-04-29 DIAGNOSIS — M85.9 LOW BONE DENSITY FOR AGE: Primary | ICD-10-CM

## 2025-04-29 DIAGNOSIS — Z79.01 WARFARIN ANTICOAGULATION: ICD-10-CM

## 2025-04-29 DIAGNOSIS — H92.22 OTORRHAGIA OF LEFT EAR: Primary | ICD-10-CM

## 2025-04-29 LAB
ALBUMIN SERPL-MCNC: 5.2 G/DL (ref 3.2–4.8)
ALBUMIN/GLOB SERPL: 1.9 {RATIO} (ref 1–2)
ALP LIVER SERPL-CCNC: 71 U/L (ref 52–144)
ALT SERPL-CCNC: 24 U/L (ref 10–49)
ANION GAP SERPL CALC-SCNC: 7 MMOL/L (ref 0–18)
AST SERPL-CCNC: 24 U/L (ref ?–34)
BILIRUB SERPL-MCNC: 0.2 MG/DL (ref 0.3–1.2)
BUN BLD-MCNC: 9 MG/DL (ref 9–23)
BUN/CREAT SERPL: 11.5 (ref 10–20)
CALCIUM BLD-MCNC: 9.4 MG/DL (ref 8.7–10.4)
CHLORIDE SERPL-SCNC: 101 MMOL/L (ref 98–112)
CO2 SERPL-SCNC: 27 MMOL/L (ref 21–32)
CREAT BLD-MCNC: 0.78 MG/DL (ref 0.55–1.02)
EGFRCR SERPLBLD CKD-EPI 2021: 111 ML/MIN/1.73M2 (ref 60–?)
FASTING STATUS PATIENT QL REPORTED: NO
GLOBULIN PLAS-MCNC: 2.7 G/DL (ref 2–3.5)
GLUCOSE BLD-MCNC: 90 MG/DL (ref 70–99)
INR BLD: 2.07 (ref 0.8–1.2)
OSMOLALITY SERPL CALC.SUM OF ELEC: 278 MOSM/KG (ref 275–295)
OSMOLALITY UR: 732 MOSM/KG (ref 300–1300)
PHOSPHATE SERPL-MCNC: 3.3 MG/DL (ref 2.4–5.1)
POTASSIUM SERPL-SCNC: 3.9 MMOL/L (ref 3.5–5.1)
PROT SERPL-MCNC: 7.9 G/DL (ref 5.7–8.2)
PROTHROMBIN TIME: 24.4 SECONDS (ref 11.6–14.8)
PTH-INTACT SERPL-MCNC: 58.6 PG/ML (ref 18.5–88)
SODIUM SERPL-SCNC: 135 MMOL/L (ref 136–145)
VIT D+METAB SERPL-MCNC: 37.4 NG/ML (ref 30–100)

## 2025-04-29 PROCEDURE — 99213 OFFICE O/P EST LOW 20 MIN: CPT | Performed by: OTOLARYNGOLOGY

## 2025-04-29 PROCEDURE — 80053 COMPREHEN METABOLIC PANEL: CPT

## 2025-04-29 PROCEDURE — 3008F BODY MASS INDEX DOCD: CPT | Performed by: OTOLARYNGOLOGY

## 2025-04-29 PROCEDURE — 83935 ASSAY OF URINE OSMOLALITY: CPT

## 2025-04-29 PROCEDURE — 82306 VITAMIN D 25 HYDROXY: CPT

## 2025-04-29 PROCEDURE — 84100 ASSAY OF PHOSPHORUS: CPT

## 2025-04-29 PROCEDURE — 83970 ASSAY OF PARATHORMONE: CPT

## 2025-04-29 PROCEDURE — 85610 PROTHROMBIN TIME: CPT

## 2025-04-29 PROCEDURE — 36415 COLL VENOUS BLD VENIPUNCTURE: CPT

## 2025-04-29 NOTE — PROGRESS NOTES
Nicky Nam is a 19 year old female.    Chief Complaint   Patient presents with    Ear Wax     Ear cleaning       HISTORY OF PRESENT ILLNESS  She presents with a history of having some blood from the left ear.  Mom states that she has she noted some bright red blood at 1 point on that side more recently she noted scabs coming out.  She uses an otoscope to examine her ear at times and did note some old debris in the ear on the left superiorly.  She does admit to using hearing aids in the past but none since the bleeding started does admit to cleaning her ears with Q-tips around the time that this bleeding did start.  No other signs, symptoms or complaints.  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     4/29/25 she presents today with recent evaluation by an audiologist who told mom that she has wax in both ears.  Previously noted to have a blood scab on the left.  Mom denies use of hearing aids recently as patient does not wish to side the ear canal.  No other signs, symptoms or complaints.      Social Hx on file[1]    Family History[2]    Past Medical History[3]    Past Surgical History[4]      REVIEW OF SYSTEMS    System Neg/Pos Details   Constitutional Negative Fatigue, fever and weight loss.   ENMT Negative Drooling.   Eyes Negative Blurred vision and vision changes.   Respiratory Negative Dyspnea and wheezing.   Cardio Negative Chest pain, irregular heartbeat/palpitations and syncope.   GI Negative Abdominal pain and diarrhea.   Endocrine Negative Cold intolerance and heat intolerance.   Neuro Negative Tremors.   Psych Negative Anxiety and depression.   Integumentary Negative Frequent skin infections, pigment change and rash.   Hema/Lymph Negative Easy bleeding and easy bruising.           PHYSICAL EXAM    Ht 4' 11\" (1.499 m)   Wt 110 lb (49.9 kg)   LMP 03/15/2025 (Approximate)   BMI 22.22 kg/m²        Constitutional Normal Overall appearance - Normal.   Psychiatric Normal Orientation -  Oriented to time, place, person & situation. Appropriate mood and affect.   Neck Exam Normal Inspection - Normal. Palpation - Normal. Parotid gland - Normal. Thyroid gland - Normal.   Eyes Normal Conjunctiva - Right: Normal, Left: Normal. Pupil - Right: Normal, Left: Normal. Fundus - Right: Normal, Left: Normal.   Neurological Normal Memory - Normal. Cranial nerves - Cranial nerves II through XII grossly intact.   Head/Face Normal Facial features - Normal. Eyebrows - Normal. Skull - Normal.        Nasopharynx Normal External nose - Normal. Lips/teeth/gums - Normal. Tonsils - Normal. Oropharynx - Normal.   Ears Normal Inspection - Right: Normal, Left: Normal. Canal - Right: Normal, Left: Normal. TM - Right: Normal, Left: Normal.  Blood scab on the posterior canal on the right and left.  Left scab is slightly larger than the right scab.   Skin Normal Inspection - Normal.        Lymph Detail Normal Submental. Submandibular. Anterior cervical. Posterior cervical. Supraclavicular.        Nose/Mouth/Throat Normal External nose - Normal. Lips/teeth/gums - Normal. Tonsils - Normal. Oropharynx - Normal.   Nose/Mouth/Throat Normal Nares - Right: Normal Left: Normal. Septum -Normal  Turbinates - Right: Normal, Left: Normal.     Medications - Current[5]  ASSESSMENT AND PLAN    1. Otorrhagia of left ear  Bilateral blood scabs noted left slightly greater than right.  I did maneuver the 1 on the left slightly and it caused her discomfort therefore will be stopped trying to remove it.  She would not allow further of what appears to be an old blood scab.  I do suspect that she may be using Q-tips or perhaps scratching her ear with her fingers causing some bleeding as she does have a history of using warfarin recently.  She currently does not wish to wear hearing aids therefore no need to remove this blood at this time.  I would simply return to see me as needed if they wish to have this removed.        This note was prepared using  Dragon [employer] voice recognition dictation software. As a result errors may occur. When identified these errors have been corrected. While every attempt is made to correct errors during dictation discrepancies may still exist    Jones Vargas MD    4/29/2025    5:41 PM         [1]   Social History  Socioeconomic History    Marital status: Single   Tobacco Use    Smoking status: Never    Smokeless tobacco: Never    Tobacco comments:     No household smokers.    Vaping Use    Vaping status: Never Used   [2] History reviewed. No pertinent family history.  [3]   Past Medical History:   Celiac disease (HCC)    Epilepsy (HCC)    Hearing loss    Hernia, diaphragmatic, congenital (HCC)    Hip dysplasia (HCC)    Mast cell activation (HCC)    Seizure disorder (HCC)    Urinary reflux    Vesicoureteral reflux   [4]   Past Surgical History:  Procedure Laterality Date    Other surgical history      diaphram hernia repair and uretral repair    [5]   Current Outpatient Medications:     warfarin 2.5 MG Oral Tab, Take by mouth., Disp: , Rfl:     metoprolol tartrate 25 MG Oral Tab, Take 0.5 tablets (12.5 mg total) by mouth 2x Daily(Beta Blocker)., Disp: 30 tablet, Rfl: 0    fexofenadine 180 MG Oral Tab, Take 1 tablet (180 mg total) by mouth 2 (two) times daily. Mast cell, Disp: , Rfl:     famotidine 20 MG Oral Tab, Take 1 tablet (20 mg total) by mouth 2 (two) times daily., Disp: , Rfl:     sodium chloride 1 g Oral Tab, Take 1 tablet (1 g total) by mouth 3 (three) times daily with meals., Disp: , Rfl:     cetirizine 10 MG Oral Tab, Take 1 tablet (10 mg total) by mouth as needed for Allergies., Disp: , Rfl:     Cholecalciferol (VITAMIN D) 50 MCG (2000 UT) Oral Cap, Take 1 capsule (2,000 Units total) by mouth daily., Disp: , Rfl:     NON FORMULARY, Take 180 mg by mouth in the morning and 180 mg before bedtime. Allegra non-drowsy 180 mg BID ., Disp: , Rfl:     EPINEPHrine (EPIPEN 2-KARTHIK) 0.3 MG/0.3ML Injection Solution Auto-injector,  Inject IM in event of  allergic reaction. Dispense twin pack, ok to dispense generic Mylan if ok with parent, Disp: 2 each, Rfl: 0    CarBAMazepine  MG Oral Capsule SR 12 Hr, , Disp: , Rfl: 8    diazepam 10 MG Rectal Gel, I 7.5 MG RECTALLY PRF SEIZURE, Disp: , Rfl: 0    FLUVIRIN Intramuscular Suspension, ADM 0.5ML IM UTD, Disp: , Rfl: 0    enoxaparin 40 MG/0.4ML Injection Solution Prefilled Syringe, Inject 0.43 mL (43 mg total) into the skin every 12 (twelve) hours., Disp: 30 each, Rfl: 0    traMADol 50 MG Oral Tab, Take 1 tablet (50 mg total) by mouth every 6 (six) hours as needed., Disp: 20 tablet, Rfl: 0

## 2025-05-09 ENCOUNTER — LAB ENCOUNTER (OUTPATIENT)
Dept: LAB | Facility: HOSPITAL | Age: 20
End: 2025-05-09
Payer: COMMERCIAL

## 2025-05-09 DIAGNOSIS — M85.9 LOW BONE DENSITY FOR AGE: ICD-10-CM

## 2025-05-09 DIAGNOSIS — N92.1 MENORRHAGIA WITH IRREGULAR CYCLE: ICD-10-CM

## 2025-05-09 DIAGNOSIS — E87.1 HYPOSMOLALITY SYNDROME: ICD-10-CM

## 2025-05-09 DIAGNOSIS — Z79.01 WARFARIN ANTICOAGULATION: ICD-10-CM

## 2025-05-09 LAB
ALBUMIN SERPL-MCNC: 4.9 G/DL (ref 3.2–4.8)
ALBUMIN/GLOB SERPL: 2.1 {RATIO} (ref 1–2)
ALP LIVER SERPL-CCNC: 62 U/L (ref 52–144)
ALT SERPL-CCNC: 27 U/L (ref 10–49)
ANION GAP SERPL CALC-SCNC: 5 MMOL/L (ref 0–18)
AST SERPL-CCNC: 25 U/L (ref ?–34)
BILIRUB SERPL-MCNC: 0.4 MG/DL (ref 0.3–1.2)
BUN BLD-MCNC: 8 MG/DL (ref 9–23)
BUN/CREAT SERPL: 11.1 (ref 10–20)
CALCIUM BLD-MCNC: 9.3 MG/DL (ref 8.7–10.4)
CHLORIDE SERPL-SCNC: 104 MMOL/L (ref 98–112)
CO2 SERPL-SCNC: 26 MMOL/L (ref 21–32)
CREAT BLD-MCNC: 0.72 MG/DL (ref 0.55–1.02)
EGFRCR SERPLBLD CKD-EPI 2021: 123 ML/MIN/1.73M2 (ref 60–?)
FASTING STATUS PATIENT QL REPORTED: NO
GLOBULIN PLAS-MCNC: 2.3 G/DL (ref 2–3.5)
GLUCOSE BLD-MCNC: 86 MG/DL (ref 70–99)
INR BLD: 2.8 (ref 0.8–1.2)
OSMOLALITY SERPL CALC.SUM OF ELEC: 278 MOSM/KG (ref 275–295)
OSMOLALITY UR: 323 MOSM/KG (ref 300–1300)
POTASSIUM SERPL-SCNC: 3.8 MMOL/L (ref 3.5–5.1)
PROT SERPL-MCNC: 7.2 G/DL (ref 5.7–8.2)
PROTHROMBIN TIME: 30.9 SECONDS (ref 11.6–14.8)
SODIUM SERPL-SCNC: 135 MMOL/L (ref 136–145)

## 2025-05-09 PROCEDURE — 83935 ASSAY OF URINE OSMOLALITY: CPT

## 2025-05-09 PROCEDURE — 80053 COMPREHEN METABOLIC PANEL: CPT

## 2025-05-09 PROCEDURE — 36415 COLL VENOUS BLD VENIPUNCTURE: CPT

## 2025-05-09 PROCEDURE — 85610 PROTHROMBIN TIME: CPT

## 2025-05-13 ENCOUNTER — LAB ENCOUNTER (OUTPATIENT)
Dept: LAB | Facility: HOSPITAL | Age: 20
End: 2025-05-13
Attending: NURSE PRACTITIONER
Payer: COMMERCIAL

## 2025-05-13 DIAGNOSIS — N92.1 MENORRHAGIA WITH IRREGULAR CYCLE: ICD-10-CM

## 2025-05-13 DIAGNOSIS — Z79.01 WARFARIN ANTICOAGULATION: ICD-10-CM

## 2025-05-13 LAB
INR BLD: 2.1 (ref 0.8–1.2)
PROTHROMBIN TIME: 24.7 SECONDS (ref 11.6–14.8)

## 2025-05-13 PROCEDURE — 85610 PROTHROMBIN TIME: CPT

## 2025-05-13 PROCEDURE — 36415 COLL VENOUS BLD VENIPUNCTURE: CPT

## 2025-05-17 ENCOUNTER — HOSPITAL ENCOUNTER (EMERGENCY)
Facility: HOSPITAL | Age: 20
Discharge: HOME | End: 2025-05-17
Attending: EMERGENCY MEDICINE
Payer: COMMERCIAL

## 2025-05-17 VITALS
OXYGEN SATURATION: 98 % | TEMPERATURE: 98 F | HEART RATE: 84 BPM | DIASTOLIC BLOOD PRESSURE: 86 MMHG | RESPIRATION RATE: 17 BRPM | SYSTOLIC BLOOD PRESSURE: 127 MMHG

## 2025-05-17 DIAGNOSIS — R11.0 NAUSEA: ICD-10-CM

## 2025-05-17 DIAGNOSIS — R00.2 PALPITATIONS: Primary | ICD-10-CM

## 2025-05-17 LAB
ALBUMIN SERPL-MCNC: 4.6 G/DL (ref 3.2–4.8)
ALP LIVER SERPL-CCNC: 58 U/L (ref 52–144)
ALT SERPL-CCNC: 22 U/L (ref 10–49)
ANION GAP SERPL CALC-SCNC: 6 MMOL/L (ref 0–18)
AST SERPL-CCNC: 20 U/L (ref ?–34)
B-HCG UR QL: NEGATIVE
BILIRUB DIRECT SERPL-MCNC: <0.1 MG/DL (ref ?–0.3)
BILIRUB SERPL-MCNC: 0.3 MG/DL (ref 0.3–1.2)
BILIRUB UR QL: NEGATIVE
BUN BLD-MCNC: 6 MG/DL (ref 9–23)
BUN/CREAT SERPL: 9.7 (ref 10–20)
CALCIUM BLD-MCNC: 8.9 MG/DL (ref 8.7–10.4)
CHLORIDE SERPL-SCNC: 103 MMOL/L (ref 98–112)
CO2 SERPL-SCNC: 26 MMOL/L (ref 21–32)
COLOR UR: YELLOW
CREAT BLD-MCNC: 0.62 MG/DL (ref 0.55–1.02)
EGFRCR SERPLBLD CKD-EPI 2021: 130 ML/MIN/1.73M2 (ref 60–?)
GLUCOSE BLD-MCNC: 113 MG/DL (ref 70–99)
GLUCOSE UR-MCNC: NORMAL MG/DL
INR BLD: 2.56 (ref 0.8–1.2)
KETONES UR-MCNC: NEGATIVE MG/DL
LEUKOCYTE ESTERASE UR QL STRIP.AUTO: NEGATIVE
NITRITE UR QL STRIP.AUTO: NEGATIVE
OSMOLALITY SERPL CALC.SUM OF ELEC: 278 MOSM/KG (ref 275–295)
PH UR: 7.5 [PH] (ref 5–8)
POTASSIUM SERPL-SCNC: 4.1 MMOL/L (ref 3.5–5.1)
PROT SERPL-MCNC: 6.6 G/DL (ref 5.7–8.2)
PROT UR-MCNC: NEGATIVE MG/DL
PROTHROMBIN TIME: 28.8 SECONDS (ref 11.6–14.8)
SODIUM SERPL-SCNC: 135 MMOL/L (ref 136–145)
SP GR UR STRIP: 1.01 (ref 1–1.03)
UROBILINOGEN UR STRIP-ACNC: NORMAL

## 2025-05-17 PROCEDURE — 99283 EMERGENCY DEPT VISIT LOW MDM: CPT

## 2025-05-17 PROCEDURE — 36415 COLL VENOUS BLD VENIPUNCTURE: CPT

## 2025-05-17 PROCEDURE — 99284 EMERGENCY DEPT VISIT MOD MDM: CPT

## 2025-05-17 PROCEDURE — 81001 URINALYSIS AUTO W/SCOPE: CPT | Performed by: EMERGENCY MEDICINE

## 2025-05-17 PROCEDURE — 93005 ELECTROCARDIOGRAM TRACING: CPT

## 2025-05-17 PROCEDURE — 85060 BLOOD SMEAR INTERPRETATION: CPT | Performed by: EMERGENCY MEDICINE

## 2025-05-17 PROCEDURE — 80076 HEPATIC FUNCTION PANEL: CPT | Performed by: EMERGENCY MEDICINE

## 2025-05-17 PROCEDURE — 85610 PROTHROMBIN TIME: CPT | Performed by: EMERGENCY MEDICINE

## 2025-05-17 PROCEDURE — 93010 ELECTROCARDIOGRAM REPORT: CPT

## 2025-05-17 PROCEDURE — 80048 BASIC METABOLIC PNL TOTAL CA: CPT | Performed by: EMERGENCY MEDICINE

## 2025-05-17 PROCEDURE — 81025 URINE PREGNANCY TEST: CPT

## 2025-05-17 PROCEDURE — 85025 COMPLETE CBC W/AUTO DIFF WBC: CPT | Performed by: EMERGENCY MEDICINE

## 2025-05-17 NOTE — ED PROVIDER NOTES
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  No oropharyngeal edema or any rashes.  She denies any symptoms currently.  Denies any abdominal pain whatsoever.  She was recently started on antibiotics for UTI and they were switched to Macrobid given susceptibilities and patient reports that the mild suprapubic pain that she had has resolved and she has actually been feeling improved.  No fevers.  No emesis    History reviewed. Past Medical History[1]    History reviewed. Past Surgical History[2]      Medications :  Prescriptions Prior to Admission[3]     Family History[4]    Smoking Status: Social Hx on file[5]    Constitutional and vital signs reviewed.      Social History and Family History elements reviewed from today, pertinent positives to the presenting problem noted.    Physical Exam     ED Triage Vitals [05/17/25 1231]   /86   Pulse 80   Resp 22   Temp 98.1 °F (36.7 °C)   Temp src Oral   SpO2 100 %   O2 Device None (Room air)       All measures to prevent infection transmission during my interaction with the patient were taken. The patient was already wearing a droplet mask on my arrival to the room. Personal protective equipment was worn throughout the duration of the exam.  Handwashing was performed prior to and after the exam.  Stethoscope and any equipment used during my examination was cleaned with super sani-cloth germicidal wipes following the exam.     Physical Exam    General: NAD  Head: Normocephalic and atraumatic.  Mouth/Throat/Ears/Nose: Oropharynx is clear    Eyes: Conjunctivae and EOM are normal.   Neck: Normal range of motion. Supple.   Cardiovascular: Normal rate, regular rhythm, normal heart sounds.  Respiratory/Chest:  Clear and equal bilaterally. Exhibits no tenderness.  Gastrointestinal: Soft, non-tender, non-distended. Bowel sounds are normal.   Musculoskeletal:No swelling or deformity.   Neurological: Alert and appropriate. No focal deficits.   Skin: Skin is warm and dry. No pallor.  Psychiatric: Has a normal mood and affect.      ED Course        Labs Reviewed   BASIC METABOLIC PANEL (8) - Abnormal; Notable for the following components:       Result Value    Glucose 113 (*)     Sodium 135 (*)     BUN 6 (*)     BUN/CREA Ratio 9.7 (*)     All other components within normal limits   CBC WITH DIFFERENTIAL WITH PLATELET - Abnormal; Notable for the following components:    WBC 2.1 (*)     HGB 11.8 (*)     RDW-SD 49.7 (*)     RDW 16.1 (*)     Neutrophil Absolute Prelim 0.96 (*)     All other components within normal limits   PROTHROMBIN TIME (PT) - Abnormal; Notable for the following components:    PT 28.8 (*)     INR 2.56 (*)     All other components within normal limits   URINALYSIS WITH CULTURE REFLEX - Abnormal; Notable for the following components:    Clarity Urine Turbid (*)     Blood Urine Trace (*)     Bacteria Urine Rare (*)     Squamous Epi. Cells Few (*)     All other components within normal limits   HEPATIC FUNCTION PANEL (7) - Normal   POCT PREGNANCY URINE - Normal   MD BLOOD SMEAR CONSULT     EKG    Rate, intervals and axes as noted on EKG Report.  Rate: 79  Rhythm: Sinus Rhythm  Reading: No STEMI.           As Interpreted by me    Imaging Results Available and Reviewed while in ED: No results found.  ED Medications Administered: Medications - No data to display      MDM     Vitals:    05/17/25 1231 05/17/25 1416   BP: 127/86    Pulse: 80 84   Resp: 22 17   Temp: 98.1 °F (36.7 °C)    TempSrc: Oral    SpO2: 100% 98%     *I personally reviewed and interpreted all ED vitals.    Pulse Ox: 100%, Room air, Normal     Monitor Interpretation:   normal sinus rhythm as interpreted by me.  The cardiac monitor was ordered given  concern for electrolyte derangements.      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    I reviewed prior chart records including this visit note from May 9, 2025.  Lab work is notable for leukopenia which does not appear to be significantly different from prior labs when compared to March 14, 2025 labs.  She has no fevers or any clinical evidence of sepsis.  Completely asymptomatic in the emergency department, discharged home in stable condition with the mother and father who are also comfortable with the plan.  Prescriptions: Continuing with the Macrobid prescription she was provided      Disposition and Plan     Clinical Impression:  1. Palpitations    2. Nausea        Disposition:  Discharge    Follow-up:  Tu Yeung MD  [address] [place] [address]  [phone]    Schedule an appointment as soon as possible for a visit in 1 day(s)        Medications Prescribed:  Discharge Medication List as of 5/17/2025  2:13 PM                           [1]   Past Medical History:   Celiac disease (HCC)    Epilepsy (HCC)    Hearing loss    Hernia, diaphragmatic, congenital (HCC)    Hip dysplasia (HCC)    Mast cell activation (HCC)    Seizure disorder (HCC)    Urinary reflux    Vesicoureteral reflux   [2]   Past Surgical History:  Procedure Laterality Date    Other surgical history      diaphram hernia repair and uretral repair    [3] (Not in a hospital admission)   [4] No family history on file.  [5]   Social History  Socioeconomic History    Marital status: Single   Tobacco Use    Smoking status: Never    Smokeless tobacco: Never    Tobacco comments:     No household smokers.    Vaping Use    Vaping status: Never Used

## 2025-05-17 NOTE — ED INITIAL ASSESSMENT (HPI)
Pt c/o of abdominal pain that started this morning with nauseas. Pt also c/o of elevated HR. Pt also having abdominal spasm. Pt taking abx for UTI   Denies diarrhea.

## 2025-05-18 LAB
ATRIAL RATE: 79 BPM
P AXIS: 38 DEGREES
P-R INTERVAL: 128 MS
Q-T INTERVAL: 358 MS
QRS DURATION: 64 MS
QTC CALCULATION (BEZET): 410 MS
R AXIS: 50 DEGREES
T AXIS: 41 DEGREES
VENTRICULAR RATE: 79 BPM

## 2025-05-19 LAB
BASOPHILS # BLD AUTO: 0.02 X10(3) UL (ref 0–0.2)
BASOPHILS NFR BLD AUTO: 0.9 %
DEPRECATED RDW RBC AUTO: 49.7 FL (ref 35.1–46.3)
EOSINOPHIL # BLD AUTO: 0.06 X10(3) UL (ref 0–0.7)
EOSINOPHIL NFR BLD AUTO: 2.8 %
ERYTHROCYTE [DISTWIDTH] IN BLOOD BY AUTOMATED COUNT: 16.1 % (ref 11–15)
HCT VFR BLD AUTO: 36.5 % (ref 35–48)
HGB BLD-MCNC: 11.8 G/DL (ref 12–16)
IMM GRANULOCYTES # BLD AUTO: 0.01 X10(3) UL (ref 0–1)
IMM GRANULOCYTES NFR BLD: 0.5 %
LYMPHOCYTES # BLD AUTO: 0.67 X10(3) UL (ref 1–4)
LYMPHOCYTES NFR BLD AUTO: 31.3 %
MCH RBC QN AUTO: 26.9 PG (ref 26–34)
MCHC RBC AUTO-ENTMCNC: 32.3 G/DL (ref 31–37)
MCV RBC AUTO: 83.3 FL (ref 80–100)
MONOCYTES # BLD AUTO: 0.42 X10(3) UL (ref 0.1–1)
MONOCYTES NFR BLD AUTO: 19.6 %
NEUTROPHILS # BLD AUTO: 0.96 X10 (3) UL (ref 1.5–7.7)
NEUTROPHILS # BLD AUTO: 0.96 X10(3) UL (ref 1.5–7.7)
NEUTROPHILS NFR BLD AUTO: 44.9 %
PLATELET # BLD AUTO: 306 10(3)UL (ref 150–450)
RBC # BLD AUTO: 4.38 X10(6)UL (ref 3.8–5.3)
WBC # BLD AUTO: 2.1 X10(3) UL (ref 4–11)

## 2025-05-27 ENCOUNTER — LAB ENCOUNTER (OUTPATIENT)
Dept: LAB | Facility: HOSPITAL | Age: 20
End: 2025-05-27
Payer: COMMERCIAL

## 2025-05-27 DIAGNOSIS — Z79.01 WARFARIN ANTICOAGULATION: ICD-10-CM

## 2025-05-27 DIAGNOSIS — M85.9 LOW BONE DENSITY FOR AGE: ICD-10-CM

## 2025-05-27 DIAGNOSIS — N92.1 MENORRHAGIA WITH IRREGULAR CYCLE: ICD-10-CM

## 2025-05-27 DIAGNOSIS — E87.1 HYPOSMOLALITY SYNDROME: ICD-10-CM

## 2025-05-27 LAB
ALBUMIN SERPL-MCNC: 4.6 G/DL (ref 3.2–4.8)
ALBUMIN/GLOB SERPL: 1.9 {RATIO} (ref 1–2)
ALP LIVER SERPL-CCNC: 57 U/L (ref 52–144)
ALT SERPL-CCNC: 19 U/L (ref 10–49)
ANION GAP SERPL CALC-SCNC: 8 MMOL/L (ref 0–18)
AST SERPL-CCNC: 21 U/L (ref ?–34)
BILIRUB SERPL-MCNC: 0.3 MG/DL (ref 0.3–1.2)
BUN BLD-MCNC: <5 MG/DL (ref 9–23)
CALCIUM BLD-MCNC: 8.9 MG/DL (ref 8.7–10.4)
CHLORIDE SERPL-SCNC: 102 MMOL/L (ref 98–112)
CO2 SERPL-SCNC: 23 MMOL/L (ref 21–32)
CREAT BLD-MCNC: 0.74 MG/DL (ref 0.55–1.02)
EGFRCR SERPLBLD CKD-EPI 2021: 118 ML/MIN/1.73M2 (ref 60–?)
FASTING STATUS PATIENT QL REPORTED: NO
GLOBULIN PLAS-MCNC: 2.4 G/DL (ref 2–3.5)
GLUCOSE BLD-MCNC: 107 MG/DL (ref 70–99)
INR BLD: 2.31 (ref 0.8–1.2)
OSMOLALITY UR: 346 MOSM/KG (ref 300–1300)
POTASSIUM SERPL-SCNC: 4 MMOL/L (ref 3.5–5.1)
PROT SERPL-MCNC: 7 G/DL (ref 5.7–8.2)
PROTHROMBIN TIME: 26.5 SECONDS (ref 11.6–14.8)
SODIUM SERPL-SCNC: 133 MMOL/L (ref 136–145)

## 2025-05-27 PROCEDURE — 36415 COLL VENOUS BLD VENIPUNCTURE: CPT

## 2025-05-27 PROCEDURE — 85610 PROTHROMBIN TIME: CPT

## 2025-05-27 PROCEDURE — 83935 ASSAY OF URINE OSMOLALITY: CPT

## 2025-05-27 PROCEDURE — 80053 COMPREHEN METABOLIC PANEL: CPT

## 2025-06-02 ENCOUNTER — LAB ENCOUNTER (OUTPATIENT)
Dept: LAB | Facility: HOSPITAL | Age: 20
End: 2025-06-02
Attending: NURSE PRACTITIONER
Payer: COMMERCIAL

## 2025-06-02 DIAGNOSIS — M85.9 LOW BONE DENSITY FOR AGE: ICD-10-CM

## 2025-06-02 DIAGNOSIS — Z79.01 WARFARIN ANTICOAGULATION: ICD-10-CM

## 2025-06-02 DIAGNOSIS — N92.1 MENORRHAGIA WITH IRREGULAR CYCLE: ICD-10-CM

## 2025-06-02 DIAGNOSIS — E87.1 HYPOSMOLALITY SYNDROME: ICD-10-CM

## 2025-06-02 LAB
ALBUMIN SERPL-MCNC: 5.2 G/DL (ref 3.2–4.8)
ALBUMIN/GLOB SERPL: 2.2 {RATIO} (ref 1–2)
ALP LIVER SERPL-CCNC: 56 U/L (ref 52–144)
ALT SERPL-CCNC: 38 U/L (ref 10–49)
ANION GAP SERPL CALC-SCNC: 11 MMOL/L (ref 0–18)
AST SERPL-CCNC: 27 U/L (ref ?–34)
BILIRUB SERPL-MCNC: 0.3 MG/DL (ref 0.3–1.2)
BUN BLD-MCNC: 6 MG/DL (ref 9–23)
BUN/CREAT SERPL: 7.5 (ref 10–20)
CALCIUM BLD-MCNC: 9.5 MG/DL (ref 8.7–10.4)
CHLORIDE SERPL-SCNC: 100 MMOL/L (ref 98–112)
CO2 SERPL-SCNC: 24 MMOL/L (ref 21–32)
CREAT BLD-MCNC: 0.8 MG/DL (ref 0.55–1.02)
EGFRCR SERPLBLD CKD-EPI 2021: 107 ML/MIN/1.73M2 (ref 60–?)
FASTING STATUS PATIENT QL REPORTED: NO
GLOBULIN PLAS-MCNC: 2.4 G/DL (ref 2–3.5)
GLUCOSE BLD-MCNC: 93 MG/DL (ref 70–99)
INR BLD: 3.02 (ref 0.8–1.2)
OSMOLALITY SERPL CALC.SUM OF ELEC: 277 MOSM/KG (ref 275–295)
POTASSIUM SERPL-SCNC: 3.4 MMOL/L (ref 3.5–5.1)
PROT SERPL-MCNC: 7.6 G/DL (ref 5.7–8.2)
PROTHROMBIN TIME: 32.7 SECONDS (ref 11.6–14.8)
SODIUM SERPL-SCNC: 135 MMOL/L (ref 136–145)

## 2025-06-02 PROCEDURE — 36415 COLL VENOUS BLD VENIPUNCTURE: CPT

## 2025-06-02 PROCEDURE — 80053 COMPREHEN METABOLIC PANEL: CPT

## 2025-06-02 PROCEDURE — 85610 PROTHROMBIN TIME: CPT

## 2025-06-17 ENCOUNTER — LAB ENCOUNTER (OUTPATIENT)
Dept: LAB | Facility: HOSPITAL | Age: 20
End: 2025-06-17
Attending: NURSE PRACTITIONER
Payer: COMMERCIAL

## 2025-06-17 DIAGNOSIS — E87.1 HYPOSMOLALITY SYNDROME: ICD-10-CM

## 2025-06-17 DIAGNOSIS — Z79.01 WARFARIN ANTICOAGULATION: ICD-10-CM

## 2025-06-17 DIAGNOSIS — M85.9 LOW BONE DENSITY FOR AGE: ICD-10-CM

## 2025-06-17 DIAGNOSIS — N92.1 MENORRHAGIA WITH IRREGULAR CYCLE: ICD-10-CM

## 2025-06-17 LAB
ALBUMIN SERPL-MCNC: 5 G/DL (ref 3.2–4.8)
ALBUMIN/GLOB SERPL: 2.3 {RATIO} (ref 1–2)
ALP LIVER SERPL-CCNC: 54 U/L (ref 52–144)
ALT SERPL-CCNC: 26 U/L (ref 10–49)
ANION GAP SERPL CALC-SCNC: 8 MMOL/L (ref 0–18)
AST SERPL-CCNC: 24 U/L (ref ?–34)
BILIRUB SERPL-MCNC: 0.2 MG/DL (ref 0.3–1.2)
BUN BLD-MCNC: 8 MG/DL (ref 9–23)
BUN/CREAT SERPL: 11.4 (ref 10–20)
CALCIUM BLD-MCNC: 9.1 MG/DL (ref 8.7–10.4)
CHLORIDE SERPL-SCNC: 102 MMOL/L (ref 98–112)
CO2 SERPL-SCNC: 25 MMOL/L (ref 21–32)
CREAT BLD-MCNC: 0.7 MG/DL (ref 0.55–1.02)
EGFRCR SERPLBLD CKD-EPI 2021: 126 ML/MIN/1.73M2 (ref 60–?)
FASTING STATUS PATIENT QL REPORTED: NO
GLOBULIN PLAS-MCNC: 2.2 G/DL (ref 2–3.5)
GLUCOSE BLD-MCNC: 93 MG/DL (ref 70–99)
INR BLD: 2.72 (ref 0.8–1.2)
OSMOLALITY SERPL CALC.SUM OF ELEC: 278 MOSM/KG (ref 275–295)
OSMOLALITY UR: 466 MOSM/KG (ref 300–1300)
POTASSIUM SERPL-SCNC: 3.6 MMOL/L (ref 3.5–5.1)
PROT SERPL-MCNC: 7.2 G/DL (ref 5.7–8.2)
PROTHROMBIN TIME: 30.1 SECONDS (ref 11.6–14.8)
SODIUM SERPL-SCNC: 135 MMOL/L (ref 136–145)

## 2025-06-17 PROCEDURE — 85610 PROTHROMBIN TIME: CPT

## 2025-06-17 PROCEDURE — 36415 COLL VENOUS BLD VENIPUNCTURE: CPT

## 2025-06-17 PROCEDURE — 80053 COMPREHEN METABOLIC PANEL: CPT

## 2025-06-17 PROCEDURE — 83935 ASSAY OF URINE OSMOLALITY: CPT

## 2025-07-11 ENCOUNTER — LAB ENCOUNTER (OUTPATIENT)
Dept: LAB | Facility: HOSPITAL | Age: 20
End: 2025-07-11
Payer: COMMERCIAL

## 2025-07-11 DIAGNOSIS — E87.1 HYPOSMOLALITY SYNDROME: ICD-10-CM

## 2025-07-11 DIAGNOSIS — M85.9 LOW BONE DENSITY FOR AGE: ICD-10-CM

## 2025-07-11 DIAGNOSIS — Z79.01 WARFARIN ANTICOAGULATION: ICD-10-CM

## 2025-07-11 DIAGNOSIS — M85.9 LOW BONE DENSITY: Primary | ICD-10-CM

## 2025-07-11 DIAGNOSIS — N92.1 MENORRHAGIA WITH IRREGULAR CYCLE: ICD-10-CM

## 2025-07-11 LAB
ALBUMIN SERPL-MCNC: 4.8 G/DL (ref 3.2–4.8)
ALBUMIN/GLOB SERPL: 2.2 {RATIO} (ref 1–2)
ALP LIVER SERPL-CCNC: 59 U/L (ref 52–144)
ALT SERPL-CCNC: 17 U/L (ref 10–49)
ANION GAP SERPL CALC-SCNC: 9 MMOL/L (ref 0–18)
AST SERPL-CCNC: 19 U/L (ref ?–34)
BILIRUB SERPL-MCNC: 0.2 MG/DL (ref 0.3–1.2)
BUN BLD-MCNC: 7 MG/DL (ref 9–23)
BUN/CREAT SERPL: 10.8 (ref 10–20)
CALCIUM BLD-MCNC: 9.2 MG/DL (ref 8.7–10.4)
CHLORIDE SERPL-SCNC: 100 MMOL/L (ref 98–112)
CO2 SERPL-SCNC: 24 MMOL/L (ref 21–32)
CREAT BLD-MCNC: 0.65 MG/DL (ref 0.55–1.02)
EGFRCR SERPLBLD CKD-EPI 2021: 128 ML/MIN/1.73M2 (ref 60–?)
FASTING STATUS PATIENT QL REPORTED: NO
FSH SERPL-ACNC: 4.1 MIU/ML
GLOBULIN PLAS-MCNC: 2.2 G/DL (ref 2–3.5)
GLUCOSE BLD-MCNC: 85 MG/DL (ref 70–99)
INR BLD: 2.54 (ref 0.8–1.2)
LH SERPL-ACNC: 0.2 MIU/ML
OSMOLALITY SERPL CALC.SUM OF ELEC: 273 MOSM/KG (ref 275–295)
POTASSIUM SERPL-SCNC: 4 MMOL/L (ref 3.5–5.1)
PROT SERPL-MCNC: 7 G/DL (ref 5.7–8.2)
PROTHROMBIN TIME: 28.6 SECONDS (ref 11.6–14.8)
SODIUM SERPL-SCNC: 133 MMOL/L (ref 136–145)

## 2025-07-11 PROCEDURE — 83001 ASSAY OF GONADOTROPIN (FSH): CPT

## 2025-07-11 PROCEDURE — 85610 PROTHROMBIN TIME: CPT

## 2025-07-11 PROCEDURE — 83002 ASSAY OF GONADOTROPIN (LH): CPT

## 2025-07-11 PROCEDURE — 82670 ASSAY OF TOTAL ESTRADIOL: CPT

## 2025-07-11 PROCEDURE — 36415 COLL VENOUS BLD VENIPUNCTURE: CPT

## 2025-07-11 PROCEDURE — 80053 COMPREHEN METABOLIC PANEL: CPT

## 2025-07-20 LAB — PEDIATRIC ESTRADIOL: <2 PG/ML

## 2025-08-20 ENCOUNTER — LAB ENCOUNTER (OUTPATIENT)
Dept: LAB | Facility: HOSPITAL | Age: 20
End: 2025-08-20
Attending: NURSE PRACTITIONER

## 2025-08-20 DIAGNOSIS — Z79.01 ANTICOAGULATED ON WARFARIN: ICD-10-CM

## 2025-08-20 DIAGNOSIS — I82.422: ICD-10-CM

## 2025-08-20 DIAGNOSIS — I82.521: ICD-10-CM

## 2025-08-20 LAB
INR BLD: 1.8 (ref 0.8–1.2)
PROTHROMBIN TIME: 21.3 SECONDS (ref 11.6–14.8)

## 2025-08-20 PROCEDURE — 85610 PROTHROMBIN TIME: CPT

## 2025-08-20 PROCEDURE — 36415 COLL VENOUS BLD VENIPUNCTURE: CPT

## 2025-08-22 ENCOUNTER — LAB ENCOUNTER (OUTPATIENT)
Dept: LAB | Facility: HOSPITAL | Age: 20
End: 2025-08-22
Attending: NURSE PRACTITIONER

## 2025-08-22 DIAGNOSIS — N92.1 MENORRHAGIA WITH IRREGULAR CYCLE: ICD-10-CM

## 2025-08-22 DIAGNOSIS — M85.9 LOW BONE DENSITY FOR AGE: ICD-10-CM

## 2025-08-22 DIAGNOSIS — R23.1 PALLOR: Primary | ICD-10-CM

## 2025-08-22 DIAGNOSIS — E87.1 HYPOSMOLALITY SYNDROME: ICD-10-CM

## 2025-08-22 DIAGNOSIS — Z79.01 WARFARIN ANTICOAGULATION: ICD-10-CM

## 2025-08-22 LAB
ALBUMIN SERPL-MCNC: 5 G/DL (ref 3.2–4.8)
ALBUMIN/GLOB SERPL: 2 (ref 1–2)
ALP LIVER SERPL-CCNC: 61 U/L (ref 52–144)
ALT SERPL-CCNC: 22 U/L (ref 10–49)
ANION GAP SERPL CALC-SCNC: 9 MMOL/L (ref 0–18)
AST SERPL-CCNC: 20 U/L (ref ?–34)
BASOPHILS # BLD AUTO: 0.02 X10(3) UL (ref 0–0.2)
BASOPHILS NFR BLD AUTO: 0.7 %
BILIRUB SERPL-MCNC: 0.3 MG/DL (ref 0.3–1.2)
BUN BLD-MCNC: 8 MG/DL (ref 9–23)
BUN/CREAT SERPL: 10.5 (ref 10–20)
CALCIUM BLD-MCNC: 9.4 MG/DL (ref 8.7–10.4)
CHLORIDE SERPL-SCNC: 104 MMOL/L (ref 98–112)
CO2 SERPL-SCNC: 25 MMOL/L (ref 21–32)
CREAT BLD-MCNC: 0.76 MG/DL (ref 0.55–1.02)
DEPRECATED RDW RBC AUTO: 45.8 FL (ref 35.1–46.3)
EGFRCR SERPLBLD CKD-EPI 2021: 114 ML/MIN/1.73M2 (ref 60–?)
EOSINOPHIL # BLD AUTO: 0.07 X10(3) UL (ref 0–0.7)
EOSINOPHIL NFR BLD AUTO: 2.4 %
ERYTHROCYTE [DISTWIDTH] IN BLOOD BY AUTOMATED COUNT: 14.9 % (ref 11–15)
FASTING STATUS PATIENT QL REPORTED: NO
GLOBULIN PLAS-MCNC: 2.5 G/DL (ref 2–3.5)
GLUCOSE BLD-MCNC: 87 MG/DL (ref 70–99)
HCT VFR BLD AUTO: 36.9 % (ref 35–48)
HGB BLD-MCNC: 12.4 G/DL (ref 12–16)
IMM GRANULOCYTES # BLD AUTO: 0.01 X10(3) UL (ref 0–1)
IMM GRANULOCYTES NFR BLD: 0.3 %
INR BLD: 1.9 (ref 0.8–1.2)
LYMPHOCYTES # BLD AUTO: 0.93 X10(3) UL (ref 1–4)
LYMPHOCYTES NFR BLD AUTO: 31.4 %
MCH RBC QN AUTO: 28.7 PG (ref 26–34)
MCHC RBC AUTO-ENTMCNC: 33.6 G/DL (ref 31–37)
MCV RBC AUTO: 85.4 FL (ref 80–100)
MONOCYTES # BLD AUTO: 0.47 X10(3) UL (ref 0.1–1)
MONOCYTES NFR BLD AUTO: 15.9 %
NEUTROPHILS # BLD AUTO: 1.46 X10 (3) UL (ref 1.5–7.7)
NEUTROPHILS # BLD AUTO: 1.46 X10(3) UL (ref 1.5–7.7)
NEUTROPHILS NFR BLD AUTO: 49.3 %
OSMOLALITY SERPL CALC.SUM OF ELEC: 284 MOSM/KG (ref 275–295)
PLATELET # BLD AUTO: 363 10(3)UL (ref 150–450)
POTASSIUM SERPL-SCNC: 4 MMOL/L (ref 3.5–5.1)
PROT SERPL-MCNC: 7.5 G/DL (ref 5.7–8.2)
PROTHROMBIN TIME: 23.1 SECONDS (ref 11.6–14.8)
RBC # BLD AUTO: 4.32 X10(6)UL (ref 3.8–5.3)
SODIUM SERPL-SCNC: 138 MMOL/L (ref 136–145)
WBC # BLD AUTO: 3 X10(3) UL (ref 4–11)

## 2025-08-22 PROCEDURE — 85610 PROTHROMBIN TIME: CPT

## 2025-08-22 PROCEDURE — 80053 COMPREHEN METABOLIC PANEL: CPT

## 2025-08-22 PROCEDURE — 36415 COLL VENOUS BLD VENIPUNCTURE: CPT

## 2025-08-22 PROCEDURE — 85025 COMPLETE CBC W/AUTO DIFF WBC: CPT

## (undated) NOTE — [place] [address]  Authorization for Invasive Procedures  Date:04/22/2024           Time: 0808    I hereby authorize ,Randy Blanton  my physician and his/her assistants (if applicable), which may include medical students, residents, and/or fellows, to perform the following surgical operation/ procedure and administer such anesthesia as may be determined necessary by my physician: Left leg venogram, thorombectomy, stent, possible thrombolytics, on Nicky Akilah Nam  2.   I recognize that during the surgical operation/procedure, unforeseen conditions may necessitate additional or different procedures than those listed above.  I, therefore, further authorize and request that the above-named surgeon, assistants, or designees perform such procedures as are, in their judgment, necessary and desirable.    3.   My surgeon/physician has discussed prior to my surgery the potential benefits, risks and side effects of this procedure; the likelihood of achieving goals; and potential problems that might occur during recuperation.  They also discussed reasonable alternatives to the procedure, including risks, benefits, and side effects related to the alternatives and risks related to not receiving this procedure.  I have had all my questions answered and I acknowledge that no guarantee has been made as to the result that may be obtained.    4.   Should the need arise during my operation/procedure, which includes change of level of care prior to discharge, I also consent to the administration of blood and/or blood products.  Further, I understand that despite careful testing and screening of blood or blood products by collecting agencies, I may still be subject to ill effects as a result of receiving a blood transfusion and/or blood products.  The following are some, but not all, of the potential risks that can occur: fever and allergic reactions, hemolytic reactions, transmission of  diseases such as Hepatitis, AIDS and Cytomegalovirus (CMV) and fluid overload.  In the event that I wish to have an autologous transfusion of my own blood, or a directed donor transfusion, I will discuss this with my physician.   Check only if Refusing Blood or Blood Products  I understand refusal of blood or blood products as deemed necessary by my physician may have serious consequences to my condition to include possible death. I hereby assume responsibility for my refusal and release the hospital, its personnel, and my physicians from any responsibility for the consequences of my refusal.         o  Refuse         5.   I authorize the use of any specimen, organs, tissues, body parts or foreign objects that may be removed from my body during the operation/procedure for diagnosis, research or teaching purposes and their subsequent disposal by hospital authorities.  I also authorize the release of specimen test results and/or written reports to my treating physician on the hospital medical staff or other referring or consulting physicians involved in my care, at the discretion of the Pathologist or my treating physician.    6.   I consent to the photographing or videotaping of the operations or procedures to be performed, including appropriate portions of my body for medical, scientific, or educational purposes, provided my identity is not revealed by the pictures or by descriptive texts accompanying them.  If the procedure has been photographed/videotaped, the surgeon will obtain the original picture, image, videotape or CD.  The hospital will not be responsible for storage, release or maintenance of the picture, image, tape or CD.    7.   I consent to the presence of a  or observers in the operating room as deemed necessary by my physician or their designees.    8.   I recognize that in the event my procedure results in extended X-Ray/fluoroscopy time, I may develop a skin reaction.    9. If I  have a Do Not Attempt Resuscitation (DNAR) order in place, that status will be suspended while in the operating room, procedural suite, and during the recovery period unless otherwise explicitly stated by me (or a person authorized to consent on my behalf). The surgeon or my attending physician will determine when the applicable recovery period ends for purposes of reinstating the DNAR order.  10. Patients having a sterilization procedure: I understand that if the procedure is successful the results will be permanent and it will therefore be impossible for me to inseminate, conceive, or bear children.  I also understand that the procedure is intended to result in sterility, although the result has not been guaranteed.   11. I acknowledge that my physician has explained sedation/analgesia administration to me including the risk and benefits I consent to the administration of sedation/analgesia as may be necessary or desirable in the judgment of my physician.    I CERTIFY THAT I HAVE READ AND FULLY UNDERSTAND THE ABOVE CONSENT TO OPERATION and/or OTHER PROCEDURE.        ____________________________________       _________________________________      ______________________________  Signature of Patient         Signature of Responsible Person        Printed Name of Responsible Person        ____________________________________      _________________________________      ______________________________       Signature of Witness          Relationship to Patient                       Date                                       Time  Patient Name: Nicky Nam  : 2005    Reviewed: 2024   Printed: 2024  Medical Record #: [id number] Page 1 of 2             STATEMENT OF PHYSICIAN My signature below affirms that prior to the time of the procedure; I have explained to the patient and/or his/her legal representative, the risks and benefits involved in the proposed treatment and any reasonable  alternative to the proposed treatment. I have also explained the risks and benefits involved in refusal of the proposed treatment and alternatives to the proposed treatment and have answered the patient's questions. If I have a significant financial interest in a co-management agreement or a significant financial interest in any product or implant, or other significant relationship used in this procedure/surgery, I have disclosed this and had a discussion with my patient.     _______________________________________________________________ _____________________________  (Signature of Physician)                                                                                         (Date)                                   (Time)  Patient Name: Nicky Nam  : 2005    Reviewed: 2024   Printed: 2024  Medical Record #: [id number] Page 2 of 2

## (undated) NOTE — ED AVS SNAPSHOT
Hal Cuevas   MRN: [id number]    Department:  [facility] Emergency Department   Date of Visit:  12/24/2017           Disclosure     Insurance plans vary and the physician(s) referred by the ER may not be covered by your plan.  Please contact CARE PHYSICIAN AT ONCE OR RETURN IMMEDIATELY TO THE EMERGENCY DEPARTMENT. If you have been prescribed any medication(s), please fill your prescription right away and begin taking the medication(s) as directed.   If you believe that any of the medications

## (undated) NOTE — LETTER
[facility]  Administration of Anesthesia  INicky agree to be cared for by a physician anesthesiologist alone and/or with a nurse anesthetist, who is specially trained to monitor me and give me medicine to put me to sleep or keep me comfortable during my procedure    I understand that my anesthesiologist and/or anesthetist is not an employee or agent of [facility] or [employer]. He or she works for [facility]    As the patient asking for anesthesia services, I agree to:  Allow the anesthesiologist (anesthesia doctor) to give me medicine and do additional procedures as necessary. Some examples are: Starting or using an “IV” to give me medicine, fluids or blood during my procedure, and having a breathing tube placed to help me breathe when I’m asleep (intubation). In the event that my heart stops working properly, I understand that my anesthesiologist will make every effort to sustain my life, unless otherwise directed by [facility] Do Not Resuscitate documents.  Tell my anesthesia doctor before my procedure:  If I am pregnant.  The last time that I ate or drank.  iii. All of the medicines I take (including prescriptions, herbal supplements, and pills I can buy without a prescription (including street drugs/illegal medications). Failure to inform my anesthesiologist about these medicines may increase my risk of anesthetic complications.  iv.If I am allergic to anything or have had a reaction to anesthesia before.  I understand how the anesthesia medicine will help me (benefits).  I understand that with any type of anesthesia medicine there are risks:  The most common risks are: nausea, vomiting, sore throat, muscle soreness, damage to my eyes, mouth, or teeth (from breathing tube placement).  Rare risks include: remembering what happened during my procedure, allergic reactions to medications, injury to my airway, heart, lungs, vision,  nerves, or muscles and in extremely rare instances death.  My doctor has explained to me other choices available to me for my care (alternatives).  Pregnant Patients (“epidural”):  I understand that the risks of having an epidural (medicine given into my back to help control pain during labor), include itching, low blood pressure, difficulty urinating, headache or slowing of the baby’s heart. Very rare risks include infection, bleeding, seizure, irregular heart rhythms and nerve injury.  Regional Anesthesia (“spinal”, “epidural”, & “nerve blocks”):  I understand that rare but potential complications include headache, bleeding, infection, seizure, irregular heart rhythms, and nerve injury.    _____________________________________________________________________________  Patient (or Representative) Signature/Relationship to Patient  Date   Time    _____________________________________________________________________________   Name (if used)    Language/Organization   Time    _____________________________________________________________________________  Nurse Anesthetist Signature     Date   Time  _____________________________________________________________________________  Anesthesiologist Signature     Date   Time  I have discussed the procedure and information above with the patient (or patient’s representative) and answered their questions. The patient or their representative has agreed to have anesthesia services.    _____________________________________________________________________________  Witness        Date   Time  I have verified that the signature is that of the patient or patient’s representative, and that it was signed before the procedure  Patient Name: Nicky Nam     : 2005                 Printed: 2024 at 11:05 AM    Medical Record #: [id number]                                            Page 1 of 1  ----------ANESTHESIA CONSENT----------